# Patient Record
Sex: MALE | Race: WHITE | NOT HISPANIC OR LATINO | Employment: OTHER | URBAN - METROPOLITAN AREA
[De-identification: names, ages, dates, MRNs, and addresses within clinical notes are randomized per-mention and may not be internally consistent; named-entity substitution may affect disease eponyms.]

---

## 2017-02-23 ENCOUNTER — OFFICE VISIT (OUTPATIENT)
Dept: URGENT CARE | Facility: CLINIC | Age: 67
End: 2017-02-23
Payer: COMMERCIAL

## 2017-02-23 VITALS
DIASTOLIC BLOOD PRESSURE: 84 MMHG | SYSTOLIC BLOOD PRESSURE: 126 MMHG | TEMPERATURE: 97.5 F | OXYGEN SATURATION: 98 % | WEIGHT: 200 LBS | BODY MASS INDEX: 29.52 KG/M2 | HEART RATE: 74 BPM | RESPIRATION RATE: 16 BRPM

## 2017-02-23 DIAGNOSIS — R10.9 ABDOMINAL PAIN, OTHER SPECIFIED SITE: ICD-10-CM

## 2017-02-23 LAB
APPEARANCE UR: NORMAL
BILIRUB UR STRIP-MCNC: NORMAL MG/DL
COLOR UR AUTO: YELLOW
GLUCOSE UR STRIP.AUTO-MCNC: NORMAL MG/DL
KETONES UR STRIP.AUTO-MCNC: NORMAL MG/DL
LEUKOCYTE ESTERASE UR QL STRIP.AUTO: NORMAL
NITRITE UR QL STRIP.AUTO: NORMAL
PH UR STRIP.AUTO: 7.5 [PH] (ref 5–8)
PROT UR QL STRIP: NORMAL MG/DL
RBC UR QL AUTO: NORMAL
SP GR UR STRIP.AUTO: 1.01
UROBILINOGEN UR STRIP-MCNC: NORMAL MG/DL

## 2017-02-23 PROCEDURE — 99214 OFFICE O/P EST MOD 30 MIN: CPT | Performed by: FAMILY MEDICINE

## 2017-02-23 PROCEDURE — 81002 URINALYSIS NONAUTO W/O SCOPE: CPT | Performed by: FAMILY MEDICINE

## 2017-02-23 RX ORDER — CETIRIZINE HYDROCHLORIDE 10 MG/1
10 TABLET ORAL DAILY
COMMUNITY

## 2017-02-23 ASSESSMENT — ENCOUNTER SYMPTOMS
DIZZINESS: 0
DIARRHEA: 0
ANOREXIA: 0
CHILLS: 0
HEMOPTYSIS: 0
PALPITATIONS: 0
MYALGIAS: 0
HEADACHES: 0
NAUSEA: 0
FEVER: 0
ABDOMINAL PAIN: 1
VOMITING: 0
HEMATOCHEZIA: 1
SHORTNESS OF BREATH: 0
CONSTIPATION: 0
COUGH: 0

## 2017-02-23 NOTE — MR AVS SNAPSHOT
Uday Riversrobbi   2017 12:15 PM   Office Visit   MRN: [id number]    Department:  [facility]   Dept Phone:  [phone]    Description:  Male : 1950   Provider:  Sheldon Gutierrez M.D.           Reason for Visit     Abdominal Pain x 1 wk off / on, lower abdominal pain and lower back pain.        Allergies as of 2017     No Known Allergies      You were diagnosed with     Abdominal pain, other specified site   [789.09.ICD-9-CM]         Vital Signs     Blood Pressure Pulse Temperature Respirations Weight Oxygen Saturation    126/84 mmHg 74 36.4 °C (97.5 °F) 16 90.719 kg (200 lb) 98%    Smoking Status                   Former Smoker           Basic Information     Date Of Birth Sex Race Ethnicity Preferred Language    1950 Male White Non- English      Your appointments     2017  9:00 AM   US AORTA 30 with [facility] US 1   IMAGING [place] [facility] ([place])    [address] [place] [address]  [facility] [id number] [phone]           Adults: Fasting 8 hours. Water is okay. Encourage patient to take medications Children under 12 years: Fasting 4-6 hours Infants: Skip 1 feeding              Problem List              ICD-10-CM Priority Class Noted - Resolved    Ventral hernia K43.9   2015 - Present      Health Maintenance        Date Due Completion Dates    IMM DTaP/Tdap/Td Vaccine (1 - Tdap) 1970 ---    COLONOSCOPY 2001 ---    IMM ZOSTER VACCINE 2011 ---    IMM PNEUMOCOCCAL 65+ (ADULT) LOW/MEDIUM RISK SERIES (1 of 2 - PCV13) 2016 ---    IMM INFLUENZA (1) 2016 ---            Results     POCT Urinalysis      Component Value Standard Range & Units    POC Color yellow Negative    POC Appearance cloudy Negative    POC Leukocyte Esterase tr Negative    POC Nitrites neg Negative    POC Urobiligen neg Negative (0.2) mg/dL    POC Protein neg Negative mg/dL    POC Urine PH 7.5 5.0 - 8.0    POC Blood neg Negative    POC Specific Gravity 1.015 <1.005 - >1.030    POC  Ketones neg Negative mg/dL    POC Biliruben neg Negative mg/dL    POC Glucose neg Negative mg/dL                        Current Immunizations     No immunizations on file.      Below and/or attached are the medications your provider expects you to take. Review all of your home medications and newly ordered medications with your provider and/or pharmacist. Follow medication instructions as directed by your provider and/or pharmacist. Please keep your medication list with you and share with your provider. Update the information when medications are discontinued, doses are changed, or new medications (including over-the-counter products) are added; and carry medication information at all times in the event of emergency situations     Allergies:  No Known Allergies          Medications  Valid as of: February 23, 2017 -  2:13 PM    Generic Name Brand Name Tablet Size Instructions for use    Ascorbic Acid (Tab) ascorbic acid 500 MG Take 500 mg by mouth every day.        Aspirin (Tablet Delayed Response) ECOTRIN 81 MG Take 81 mg by mouth every day.        Azithromycin (Tab) ZITHROMAX 250 MG z-todd; U.D.        Cetirizine HCl (Tab) ZYRTEC 10 MG Take 10 mg by mouth every day.        Cholecalciferol (Cap) Vitamin D3 2000 UNIT Take  by mouth every day.        Fluticasone Propionate   Spray  in nose.        Glucosamine-Chondroit-Vit C-Mn   Take 2 Tabs by mouth every day.        Lovastatin (TABLET SR 24 HR) ALTOPREV 40 MG Take 40 mg by mouth every evening.        Multiple Vitamins-Minerals   Take 1 Tab by mouth.        OxyCODONE HCl (Tab) ROXICODONE 5 MG Take 1-2 Tabs by mouth every four hours as needed ((Pain Scale 4-6)).        .                 Medicines prescribed today were sent to:     [address] (), [place] [address]    [address] () [place] [address]    Phone: [phone] Fax: [phone]    Open 24 Hours?: No      Medication refill instructions:       If your prescription bottle  indicates you have medication refills left, it is not necessary to call your provider’s office. Please contact your pharmacy and they will refill your medication.    If your prescription bottle indicates you do not have any refills left, you may request refills at any time through one of the following ways: The online "[employer] system (except Urgent Care), by calling your provider’s office, or by asking your pharmacy to contact your provider’s office with a refill request. Medication refills are processed only during regular business hours and may not be available until the next business day. Your provider may request additional information or to have a follow-up visit with you prior to refilling your medication.   *Please Note: Medication refills are assigned a new Rx number when refilled electronically. Your pharmacy may indicate that no refills were authorized even though a new prescription for the same medication is available at the pharmacy. Please request the medicine by name with the pharmacy before contacting your provider for a refill.        Your To Do List     Future Labs/Procedures Complete By Expires    US-AORTA  As directed 2/23/2018    Comments:    Order in epic bl.         MyChart Access Code: [email or web address]  Expires: 3/6/2017  9:30 AM    "[employer]  A secure, online tool to manage your health information     [employer]’s "[employer]® is a secure, online tool that connects you to your personalized health information from the privacy of your home -- day or night - making it very easy for you to manage your healthcare. Once the activation process is completed, you can even access your medical information using the "[employer] leanne, which is available for free in the Apple Leanne store or Google Play store.     "[employer] provides the following levels of access (as shown below):   My Chart Features   [facility] Doctor [facility]  Specialists [facility]  Urgent  Care Non-[facility]  Primary Care  Doctor   Email your  healthcare team securely and privately 24/7 X X X    Manage appointments: schedule your next appointment; view details of past/upcoming appointments X      Request prescription refills. X      View recent personal medical records, including lab and immunizations X X X X   View health record, including health history, allergies, medications X X X X   Read reports about your outpatient visits, procedures, consult and ER notes X X X X   See your discharge summary, which is a recap of your hospital and/or ER visit that includes your diagnosis, lab results, and care plan. X X       How to register for [employer]:  1. Go to  [email or web address].  2. Click on the Sign Up Now box, which takes you to the New Member Sign Up page. You will need to provide the following information:  a. Enter your [employer] Access Code exactly as it appears at the top of this page. (You will not need to use this code after you’ve completed the sign-up process. If you do not sign up before the expiration date, you must request a new code.)   b. Enter your date of birth.   c. Enter your home email address.   d. Click Submit, and follow the next screen’s instructions.  3. Create a [employer] ID. This will be your [employer] login ID and cannot be changed, so think of one that is secure and easy to remember.  4. Create a [employer] password. You can change your password at any time.  5. Enter your Password Reset Question and Answer. This can be used at a later time if you forget your password.   6. Enter your e-mail address. This allows you to receive e-mail notifications when new information is available in [employer].  7. Click Sign Up. You can now view your health information.    For assistance activating your [employer] account, call [phone]

## 2017-02-23 NOTE — PROGRESS NOTES
Subjective:      Uday Osman is a 66 y.o. male who presents with Abdominal Pain            Abdominal Pain  This is a new problem. The current episode started 1 to 4 weeks ago. The onset quality is gradual. The problem occurs intermittently. Duration: lasting less than 1 minute. The problem has been gradually worsening. Pain location: LEFT and RIGHT marcell-umbilical and low back. The pain is at a severity of 3/10. The pain is mild. The quality of the pain is sharp and aching. The abdominal pain does not radiate. Associated symptoms include frequency and hematochezia. Pertinent negatives include no anorexia, constipation, diarrhea, dysuria, fever, headaches, hematuria, melena, myalgias, nausea or vomiting. Associated symptoms comments: Known hx of hemorrhoids. Nothing aggravates the pain. The pain is relieved by nothing. Treatments tried: laxitive. The treatment provided no relief. His past medical history is significant for abdominal surgery. There is no history of GERD, irritable bowel syndrome or ulcerative colitis.       Review of Systems   Constitutional: Negative for fever and chills.   Respiratory: Negative for cough, hemoptysis and shortness of breath.    Cardiovascular: Negative for chest pain and palpitations.   Gastrointestinal: Positive for abdominal pain and hematochezia. Negative for nausea, vomiting, diarrhea, constipation, melena and anorexia.   Genitourinary: Positive for frequency. Negative for dysuria and hematuria.   Musculoskeletal: Negative for myalgias.   Neurological: Negative for dizziness and headaches.     PMH:  has a past medical history of Snoring; Cold (3/15/15); and High cholesterol.  MEDS:   Current outpatient prescriptions:   •  cetirizine (ZYRTEC) 10 MG Tab, Take 10 mg by mouth every day., Disp: , Rfl:   •  aspirin EC (ECOTRIN) 81 MG TBEC, Take 81 mg by mouth every day., Disp: , Rfl:   •  Cholecalciferol (VITAMIN D3) 2000 UNIT CAPS, Take  by mouth every day., Disp: , Rfl:   •   ascorbic acid (ASCORBIC ACID) 500 MG TABS, Take 500 mg by mouth every day., Disp: , Rfl:   •  Multiple Vitamins-Minerals (MULTIVITAMIN PO), Take 1 Tab by mouth., Disp: , Rfl:   •  Glucosamine-Chondroit-Vit C-Mn (GLUCOSAMINE 1500 COMPLEX PO), Take 2 Tabs by mouth every day., Disp: , Rfl:   •  lovastatin (ALTOPREV) 40 MG ER tablet, Take 40 mg by mouth every evening., Disp: , Rfl:   •  Fluticasone Propionate (FLONASE NA), Spray  in nose., Disp: , Rfl:   •  oxycodone immediate-release (ROXICODONE) 5 MG TABS, Take 1-2 Tabs by mouth every four hours as needed ((Pain Scale 4-6))., Disp: 60 Tab, Rfl: 0  •  azithromycin (ZITHROMAX) 250 MG TABS, z-todd; U.D., Disp: 6 Tab, Rfl: 1  ALLERGIES: No Known Allergies  SURGHX:   Past Surgical History   Procedure Laterality Date   • Other orthopedic surgery       fx finger/ age 15   • Colonoscopy  2013     X 2   • Ventral hernia repair  4/22/2015     Performed by Sánchez Mayorga M.D. at SURGERY [facility]     SOCHX:  reports that he quit smoking about 27 years ago. His smoking use included Cigarettes. He has a 25 pack-year smoking history. He has never used smokeless tobacco. He reports that he does not drink alcohol or use illicit drugs.  FH: Family history was reviewed, no pertinent findings to report       Objective:     /84 mmHg  Pulse 74  Temp(Src) 36.4 °C (97.5 °F)  Resp 16  Wt 90.719 kg (200 lb)  SpO2 98%     Physical Exam   Constitutional: He appears well-developed.   HENT:   Head: Normocephalic.   Cardiovascular: Normal rate.    No murmur heard.  Pulmonary/Chest: Effort normal. No respiratory distress. He has no wheezes.   Abdominal: Soft. He exhibits no distension. There is no tenderness.   Aortic pulsations at LEFT mid to upper abdomen   Neurological: He is alert.   Skin: Skin is warm and dry.   Psychiatric: He has a normal mood and affect. His behavior is normal.               Assessment/Plan:     1. Abdominal pain, other specified site  POCT Urinalysis     US-AORTA     Intermittent abdominal pain  Pulsations on abd exam  Check aortic u/s  Follow-up if symptoms worsen or fail to improve, ER if after hrs    ADDENDUM:  February 24th 2017  Patient called  Requesting results to abdominal ultrasound  Relayed results of ultrasound findings being NORMAL  Mild atherosclerosis, otherwise normal study  Advised patient that this rules out a large risk factor for his presenting complaint of abdominal pain with radiation to the back  Location does not correlate with pancreatitis or other associated issues in the right upper quadrant  At this point, recommend following up with PCP for consideration of further blood work or possible referral to gastroenterology for consideration of lower GI endoscopy

## 2017-02-24 ENCOUNTER — HOSPITAL ENCOUNTER (OUTPATIENT)
Dept: RADIOLOGY | Facility: MEDICAL CENTER | Age: 67
End: 2017-02-24
Attending: FAMILY MEDICINE
Payer: COMMERCIAL

## 2017-02-24 DIAGNOSIS — R10.9 ABDOMINAL PAIN, OTHER SPECIFIED SITE: ICD-10-CM

## 2017-02-24 PROCEDURE — 76775 US EXAM ABDO BACK WALL LIM: CPT

## 2018-03-21 ENCOUNTER — OFFICE VISIT (OUTPATIENT)
Dept: MEDICAL GROUP | Facility: MEDICAL CENTER | Age: 68
End: 2018-03-21
Payer: COMMERCIAL

## 2018-03-21 VITALS
DIASTOLIC BLOOD PRESSURE: 82 MMHG | HEIGHT: 69 IN | WEIGHT: 200.62 LBS | SYSTOLIC BLOOD PRESSURE: 124 MMHG | RESPIRATION RATE: 16 BRPM | TEMPERATURE: 98.5 F | BODY MASS INDEX: 29.71 KG/M2 | HEART RATE: 64 BPM | OXYGEN SATURATION: 97 %

## 2018-03-21 DIAGNOSIS — K57.30 DIVERTICULOSIS OF LARGE INTESTINE WITHOUT HEMORRHAGE: ICD-10-CM

## 2018-03-21 DIAGNOSIS — Z12.5 SPECIAL SCREENING FOR MALIGNANT NEOPLASM OF PROSTATE: ICD-10-CM

## 2018-03-21 DIAGNOSIS — E78.1 PURE HYPERGLYCERIDEMIA: ICD-10-CM

## 2018-03-21 DIAGNOSIS — Z13.1 SCREENING FOR DIABETES MELLITUS (DM): ICD-10-CM

## 2018-03-21 DIAGNOSIS — E78.2 MIXED HYPERLIPIDEMIA: ICD-10-CM

## 2018-03-21 DIAGNOSIS — Z00.00 ANNUAL PHYSICAL EXAM: ICD-10-CM

## 2018-03-21 DIAGNOSIS — Z13.6 SCREENING FOR ISCHEMIC HEART DISEASE: ICD-10-CM

## 2018-03-21 PROCEDURE — 99387 INIT PM E/M NEW PAT 65+ YRS: CPT | Performed by: FAMILY MEDICINE

## 2018-03-21 ASSESSMENT — PATIENT HEALTH QUESTIONNAIRE - PHQ9: CLINICAL INTERPRETATION OF PHQ2 SCORE: 0

## 2018-03-21 NOTE — PROGRESS NOTES
This medical record contains text that has been entered with the assistance of computer voice recognition and dictation software.  Therefore, it may contain unintended errors in text, spelling, punctuation, or grammar        Chief Complaint   Patient presents with   • Establish Care       Uday Osman is a 67 y.o. 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 90 Tab 3   • cetirizine (ZYRTEC) 10 MG Tab Take 10 mg by mouth every day.     • aspirin EC (ECOTRIN) 81 MG TBEC Take 81 mg by mouth every day.     • Cholecalciferol (VITAMIN D3) 2000 UNIT CAPS Take  by mouth every day.     • ascorbic acid (ASCORBIC ACID) 500 MG TABS Take 500 mg by mouth every day.     • Multiple Vitamins-Minerals (MULTIVITAMIN PO) Take 1 Tab by mouth.     • Glucosamine-Chondroit-Vit C-Mn (GLUCOSAMINE 1500 COMPLEX PO) Take 2 Tabs by mouth every day.     • Fluticasone Propionate (FLONASE NA) Spray  in nose.       No current facility-administered medications for this visit.      Patient Active Problem List    Diagnosis Date Noted   • Mixed hyperlipidemia 03/21/2018   • Annual physical exam 03/21/2018   • Diverticulosis of large intestine without hemorrhage 03/21/2018   • Ventral hernia 04/22/2015     Past Surgical History:   Procedure Laterality Date   • VENTRAL HERNIA REPAIR  4/22/2015    Performed by Sánchez Mayorga M.D. at SURGERY [facility]   • COLONOSCOPY  2013    X 2   • OTHER ORTHOPEDIC SURGERY      fx finger/ age 15      Social History   Substance Use Topics   • Smoking status: Former Smoker     Packs/day: 1.00     Years: 25.00      "Types: Cigarettes     Quit date: 1/1/1990   • Smokeless tobacco: Never Used   • Alcohol use Yes      Comment: one drink/week     History reviewed. No pertinent family history.        ROS  No angina  all review of system completed and negative except for those listed above     Objective:     Blood pressure 124/82, pulse 64, temperature 36.9 °C (98.5 °F), resp. rate 16, height 1.753 m (5' 9\"), weight 91 kg (200 lb 9.9 oz), SpO2 97 %. Body mass index is 29.63 kg/m².  Physical Exam:    Constitutional: Alert, no distress.  Skin: Warm, dry, good turgor, no rashes in visible areas.  Eye: Equal, round and reactive, conjunctiva clear, lids normal.  ENMT: Lips without lesions, good dentition, oropharynx clear.  Neck: Trachea midline, no masses, no thyromegaly. No cervical or supraclavicular lymphadenopathy.  Respiratory: Unlabored respiratory effort, lungs clear to auscultation, no wheezes, no ronchi.  Cardiovascular: Normal S1, S2, no murmur, no edema.  Abdomen: Soft, non-tender, no masses, no hepatosplenomegaly.  Psych: Alert and oriented x3, normal affect and mood.              Assessment and Plan:   The following treatment plan was discussed        Problem List Items Addressed This Visit     Mixed hyperlipidemia     He usually does blood work including PSA at work 2x yearly, he will send those labs over   Tolerating his statin no problem , continue statin                     Relevant Medications    lovastatin (ALTOPREV) 40 MG ER tablet    Other Relevant Orders    PROSTATE SPECIFIC AG SCREENING    Annual physical exam     Up to date colonolscopy  Had 1 year ago due when he turns 71 for follow up         All other health maintenance up to date including todays orders               Diverticulosis of large intestine without hemorrhage      Other Visit Diagnoses     Special screening for malignant neoplasm of prostate        Relevant Orders    PROSTATE SPECIFIC AG SCREENING    Screening for diabetes mellitus (DM)        " Relevant Orders    PROSTATE SPECIFIC AG SCREENING    Screening for ischemic heart disease        Relevant Orders    PROSTATE SPECIFIC AG SCREENING                Instructed to follow up if symptoms worsen or fail to improve, ER/UC precautions discussed as well    Jesi Najera MD  [facility], Family [address]   [place] [address]  Phone: [phone]

## 2018-03-21 NOTE — ASSESSMENT & PLAN NOTE
He usually does blood work including PSA at work 2x yearly, he will send those labs over   Tolerating his statin no problem , continue statin

## 2018-03-21 NOTE — ASSESSMENT & PLAN NOTE
Up to date colonolscopy  Had 1 year ago due when he turns 71 for follow up         All other health maintenance up to date including todays orders

## 2018-06-21 RX ORDER — LOVASTATIN 40 MG/1
40 TABLET ORAL NIGHTLY
COMMUNITY
End: 2018-06-21 | Stop reason: SDUPTHER

## 2018-06-22 RX ORDER — LOVASTATIN 40 MG/1
40 TABLET ORAL EVERY EVENING
Qty: 90 TAB | Refills: 1 | Status: SHIPPED | OUTPATIENT
Start: 2018-06-22 | End: 2018-10-29 | Stop reason: SDUPTHER

## 2018-10-29 RX ORDER — LOVASTATIN 40 MG/1
TABLET ORAL
Qty: 90 TAB | Refills: 1 | Status: SHIPPED | OUTPATIENT
Start: 2018-10-29 | End: 2020-09-15

## 2019-03-13 ENCOUNTER — HOSPITAL ENCOUNTER (OUTPATIENT)
Dept: LAB | Facility: MEDICAL CENTER | Age: 69
End: 2019-03-13
Attending: SPECIALIST
Payer: COMMERCIAL

## 2019-03-13 LAB
ALBUMIN SERPL BCP-MCNC: 4.4 G/DL (ref 3.2–4.9)
ALBUMIN/GLOB SERPL: 1.5 G/DL
ALP SERPL-CCNC: 74 U/L (ref 30–99)
ALT SERPL-CCNC: 32 U/L (ref 2–50)
ANION GAP SERPL CALC-SCNC: 9 MMOL/L (ref 0–11.9)
AST SERPL-CCNC: 47 U/L (ref 12–45)
BASOPHILS # BLD AUTO: 0.8 % (ref 0–1.8)
BASOPHILS # BLD: 0.06 K/UL (ref 0–0.12)
BILIRUB SERPL-MCNC: 0.4 MG/DL (ref 0.1–1.5)
BUN SERPL-MCNC: 10 MG/DL (ref 8–22)
CALCIUM SERPL-MCNC: 9.5 MG/DL (ref 8.5–10.5)
CHLORIDE SERPL-SCNC: 107 MMOL/L (ref 96–112)
CO2 SERPL-SCNC: 29 MMOL/L (ref 20–33)
CREAT SERPL-MCNC: 0.96 MG/DL (ref 0.5–1.4)
CRP SERPL HS-MCNC: 0.05 MG/DL (ref 0–0.75)
EOSINOPHIL # BLD AUTO: 0.61 K/UL (ref 0–0.51)
EOSINOPHIL NFR BLD: 8.6 % (ref 0–6.9)
ERYTHROCYTE [DISTWIDTH] IN BLOOD BY AUTOMATED COUNT: 45.5 FL (ref 35.9–50)
GLOBULIN SER CALC-MCNC: 2.9 G/DL (ref 1.9–3.5)
GLUCOSE SERPL-MCNC: 93 MG/DL (ref 65–99)
HCT VFR BLD AUTO: 46.7 % (ref 42–52)
HGB BLD-MCNC: 15.2 G/DL (ref 14–18)
IMM GRANULOCYTES # BLD AUTO: 0.03 K/UL (ref 0–0.11)
IMM GRANULOCYTES NFR BLD AUTO: 0.4 % (ref 0–0.9)
LYMPHOCYTES # BLD AUTO: 1.69 K/UL (ref 1–4.8)
LYMPHOCYTES NFR BLD: 23.9 % (ref 22–41)
MCH RBC QN AUTO: 31.8 PG (ref 27–33)
MCHC RBC AUTO-ENTMCNC: 32.5 G/DL (ref 33.7–35.3)
MCV RBC AUTO: 97.7 FL (ref 81.4–97.8)
MONOCYTES # BLD AUTO: 0.52 K/UL (ref 0–0.85)
MONOCYTES NFR BLD AUTO: 7.4 % (ref 0–13.4)
NEUTROPHILS # BLD AUTO: 4.16 K/UL (ref 1.82–7.42)
NEUTROPHILS NFR BLD: 58.9 % (ref 44–72)
NRBC # BLD AUTO: 0 K/UL
NRBC BLD-RTO: 0 /100 WBC
PLATELET # BLD AUTO: 206 K/UL (ref 164–446)
PMV BLD AUTO: 11.2 FL (ref 9–12.9)
POTASSIUM SERPL-SCNC: 4.2 MMOL/L (ref 3.6–5.5)
PROT SERPL-MCNC: 7.3 G/DL (ref 6–8.2)
RBC # BLD AUTO: 4.78 M/UL (ref 4.7–6.1)
RHEUMATOID FACT SER IA-ACNC: <10 IU/ML (ref 0–14)
SODIUM SERPL-SCNC: 145 MMOL/L (ref 135–145)
WBC # BLD AUTO: 7.1 K/UL (ref 4.8–10.8)

## 2019-03-13 PROCEDURE — 85025 COMPLETE CBC W/AUTO DIFF WBC: CPT

## 2019-03-13 PROCEDURE — 86140 C-REACTIVE PROTEIN: CPT

## 2019-03-13 PROCEDURE — 86431 RHEUMATOID FACTOR QUANT: CPT

## 2019-03-13 PROCEDURE — 36415 COLL VENOUS BLD VENIPUNCTURE: CPT

## 2019-03-13 PROCEDURE — 86255 FLUORESCENT ANTIBODY SCREEN: CPT

## 2019-03-13 PROCEDURE — 86038 ANTINUCLEAR ANTIBODIES: CPT

## 2019-03-13 PROCEDURE — 80053 COMPREHEN METABOLIC PANEL: CPT

## 2019-03-15 LAB — NUCLEAR IGG SER QL IA: NORMAL

## 2019-03-16 LAB — ANCA IGG TITR SER IF: NORMAL {TITER}

## 2019-12-26 ENCOUNTER — OFFICE VISIT (OUTPATIENT)
Dept: URGENT CARE | Facility: CLINIC | Age: 69
End: 2019-12-26
Payer: COMMERCIAL

## 2019-12-26 VITALS
DIASTOLIC BLOOD PRESSURE: 80 MMHG | TEMPERATURE: 98.9 F | WEIGHT: 205 LBS | HEIGHT: 69 IN | BODY MASS INDEX: 30.36 KG/M2 | HEART RATE: 108 BPM | OXYGEN SATURATION: 96 % | SYSTOLIC BLOOD PRESSURE: 110 MMHG | RESPIRATION RATE: 16 BRPM

## 2019-12-26 DIAGNOSIS — R11.2 NAUSEA AND VOMITING, INTRACTABILITY OF VOMITING NOT SPECIFIED, UNSPECIFIED VOMITING TYPE: ICD-10-CM

## 2019-12-26 LAB
FLUAV+FLUBV AG SPEC QL IA: NEGATIVE
INT CON NEG: NEGATIVE
INT CON POS: POSITIVE

## 2019-12-26 PROCEDURE — 99214 OFFICE O/P EST MOD 30 MIN: CPT | Performed by: FAMILY MEDICINE

## 2019-12-26 PROCEDURE — 87804 INFLUENZA ASSAY W/OPTIC: CPT | Performed by: FAMILY MEDICINE

## 2019-12-26 RX ORDER — ONDANSETRON 4 MG/1
4 TABLET, ORALLY DISINTEGRATING ORAL EVERY 8 HOURS PRN
Qty: 6 TAB | Refills: 0 | Status: SHIPPED | OUTPATIENT
Start: 2019-12-26 | End: 2020-09-15

## 2019-12-26 RX ORDER — ONDANSETRON 4 MG/1
4 TABLET, ORALLY DISINTEGRATING ORAL ONCE
Status: COMPLETED | OUTPATIENT
Start: 2019-12-26 | End: 2019-12-26

## 2019-12-26 RX ORDER — ATORVASTATIN CALCIUM 20 MG/1
20 TABLET, FILM COATED ORAL NIGHTLY
COMMUNITY
End: 2021-06-17

## 2019-12-26 RX ADMIN — ONDANSETRON 4 MG: 4 TABLET, ORALLY DISINTEGRATING ORAL at 13:31

## 2019-12-26 ASSESSMENT — ENCOUNTER SYMPTOMS
WEIGHT LOSS: 0
EYE REDNESS: 0
MYALGIAS: 0
EYE DISCHARGE: 0
HEADACHES: 0

## 2019-12-26 NOTE — LETTER
December 26, 2019         Patient: Uday Osman   YOB: 1950   Date of Visit: 12/26/2019           To Whom it May Concern:    Uday Osman was seen in my clinic on 12/26/2019. Please excuse 12/26 through 12/28/2019. He may return on the 28th if no symptoms of contagious illness for 24 hours.     Sincerely,           Cristobal Dan M.D.  Electronically Signed

## 2019-12-26 NOTE — PROGRESS NOTES
"Subjective:      Uday Osman is a 69 y.o. male who presents with Flu Like Symptoms (vomiting and nausea, started today, still having nausea and diarrhea, chills)            Onset today N/V without blood in emesis. Intermittent mild chills. 2 episodes loose stool without blood. No abdominal pain. Possible turkey salad trigger. Normal urine output last night. Has urinated once here small volume.   No recent foreign travel, camping, abx. No cough/congestion. No other aggravating or alleviating factors.       Review of Systems   Constitutional: Negative for malaise/fatigue and weight loss.   Eyes: Negative for discharge and redness.   Musculoskeletal: Negative for joint pain and myalgias.   Skin: Negative for itching and rash.   Neurological: Negative for headaches.     .  Medications, Allergies, and current problem list reviewed today in Epic       Objective:     /80 (BP Location: Left arm, Patient Position: Sitting, BP Cuff Size: Adult)   Pulse (!) 108   Temp 37.2 °C (98.9 °F) (Temporal)   Resp 16   Ht 1.753 m (5' 9\")   Wt 93 kg (205 lb)   SpO2 96%   BMI 30.27 kg/m²      Physical Exam  Constitutional:       General: He is not in acute distress.     Appearance: He is well-developed.   HENT:      Head: Normocephalic and atraumatic.      Mouth/Throat:      Mouth: Mucous membranes are moist.   Eyes:      Conjunctiva/sclera: Conjunctivae normal.   Cardiovascular:      Rate and Rhythm: Normal rate and regular rhythm.      Heart sounds: Normal heart sounds. No murmur.      Comments: Rate now 90's  Pulmonary:      Effort: Pulmonary effort is normal.      Breath sounds: Normal breath sounds. No wheezing.   Abdominal:      Palpations: Abdomen is soft.      Tenderness: There is no tenderness.      Comments: Hyperactive bowel sounds.     Skin:     General: Skin is warm and dry.      Findings: No rash.   Neurological:      Mental Status: He is alert and oriented to person, place, and time.               "   Assessment/Plan:     Influenza negative    1. Nausea and vomiting, intractability of vomiting not specified, unspecified vomiting type  ondansetron (ZOFRAN ODT) dispertab 4 mg    POCT Influenza A/B    ondansetron (ZOFRAN ODT) 4 MG TABLET DISPERSIBLE     Differential diagnosis, natural history, supportive care, and indications for immediate follow-up discussed at length.

## 2020-01-15 ENCOUNTER — OFFICE VISIT (OUTPATIENT)
Dept: URGENT CARE | Facility: CLINIC | Age: 70
End: 2020-01-15
Payer: COMMERCIAL

## 2020-01-15 VITALS
SYSTOLIC BLOOD PRESSURE: 122 MMHG | HEIGHT: 69 IN | OXYGEN SATURATION: 95 % | RESPIRATION RATE: 16 BRPM | TEMPERATURE: 98 F | WEIGHT: 208 LBS | BODY MASS INDEX: 30.81 KG/M2 | DIASTOLIC BLOOD PRESSURE: 82 MMHG | HEART RATE: 84 BPM

## 2020-01-15 DIAGNOSIS — J06.9 VIRAL URI WITH COUGH: ICD-10-CM

## 2020-01-15 DIAGNOSIS — R09.81 NASAL CONGESTION: ICD-10-CM

## 2020-01-15 PROCEDURE — 99214 OFFICE O/P EST MOD 30 MIN: CPT | Performed by: PHYSICIAN ASSISTANT

## 2020-01-15 RX ORDER — CODEINE PHOSPHATE/GUAIFENESIN 10-100MG/5
5 LIQUID (ML) ORAL 3 TIMES DAILY PRN
Qty: 120 ML | Refills: 0 | Status: SHIPPED | OUTPATIENT
Start: 2020-01-15 | End: 2020-01-22

## 2020-01-15 RX ORDER — BENZONATATE 100 MG/1
100-200 CAPSULE ORAL 3 TIMES DAILY PRN
Qty: 60 CAP | Refills: 0 | Status: SHIPPED | OUTPATIENT
Start: 2020-01-15 | End: 2020-09-15

## 2020-01-15 ASSESSMENT — ENCOUNTER SYMPTOMS
DIZZINESS: 0
VOMITING: 0
SINUS PRESSURE: 1
SINUS PAIN: 1
NAUSEA: 0
WHEEZING: 0
FEVER: 0
SORE THROAT: 0
SPUTUM PRODUCTION: 0
HEADACHES: 1
HEMOPTYSIS: 0
SHORTNESS OF BREATH: 0
DIARRHEA: 0
MUSCULOSKELETAL NEGATIVE: 1
CHILLS: 0
COUGH: 1
ABDOMINAL PAIN: 0

## 2020-01-15 NOTE — PROGRESS NOTES
"Subjective:      Uday Osman is a 69 y.o. male who presents with Sinus Problem (x 4 days, nasal congestion, stuffy and runny nose, headaches) and Cough (x 2-3 days, nonproductive cough and chest congestion)            Sinus Problem   This is a new problem. The current episode started in the past 7 days (2-3 days). The problem is unchanged. There has been no fever. His pain is at a severity of 2/10. The pain is mild. Associated symptoms include congestion, coughing, headaches and sinus pressure. Pertinent negatives include no chills, ear pain, shortness of breath or sore throat. Past treatments include nothing.     Patient denies fevers, chills, body aches, chest pain, wheezing, productive cough, or SOB. No history of chronic lung disease or pneumonia. No history of smoking. He denies frequent/recurrent sinus infections. No known sick contacts or recent use of antibiotics.     Review of Systems   Constitutional: Negative for chills and fever.   HENT: Positive for congestion, sinus pressure and sinus pain. Negative for ear pain and sore throat.    Respiratory: Positive for cough. Negative for hemoptysis, sputum production, shortness of breath and wheezing.    Cardiovascular: Negative for chest pain.   Gastrointestinal: Negative for abdominal pain, diarrhea, nausea and vomiting.   Genitourinary: Negative.    Musculoskeletal: Negative.    Skin: Negative for rash.   Neurological: Positive for headaches. Negative for dizziness.        Objective:     /82 (BP Location: Left arm, Patient Position: Sitting, BP Cuff Size: Large adult)   Pulse 84   Temp 36.7 °C (98 °F) (Temporal)   Resp 16   Ht 1.753 m (5' 9\")   Wt 94.3 kg (208 lb)   SpO2 95%   BMI 30.72 kg/m²      Physical Exam  Vitals signs and nursing note reviewed.   Constitutional:       Appearance: Normal appearance. He is well-developed. He is not ill-appearing.   HENT:      Head: Normocephalic and atraumatic.      Right Ear: Hearing, tympanic " membrane, ear canal and external ear normal. There is no impacted cerumen.      Left Ear: Hearing, tympanic membrane, ear canal and external ear normal. There is no impacted cerumen.      Nose: Mucosal edema, congestion and rhinorrhea present.      Right Sinus: No maxillary sinus tenderness or frontal sinus tenderness.      Left Sinus: No maxillary sinus tenderness or frontal sinus tenderness.      Mouth/Throat:      Mouth: Mucous membranes are moist.      Pharynx: No oropharyngeal exudate or posterior oropharyngeal erythema.   Eyes:      Conjunctiva/sclera: Conjunctivae normal.      Pupils: Pupils are equal, round, and reactive to light.   Neck:      Musculoskeletal: Normal range of motion.   Cardiovascular:      Rate and Rhythm: Normal rate and regular rhythm.      Heart sounds: Normal heart sounds. No murmur.   Pulmonary:      Effort: Pulmonary effort is normal.      Breath sounds: Normal breath sounds. No wheezing or rales.   Musculoskeletal: Normal range of motion.   Skin:     General: Skin is warm and dry.   Neurological:      Mental Status: He is alert and oriented to person, place, and time.   Psychiatric:         Behavior: Behavior normal.            PMH:  has a past medical history of Cold (3/15/15), High cholesterol, and Snoring.  MEDS:   Current Outpatient Medications:   •  guaifenesin-codeine (TUSSI-ORGANIDIN NR) 100-10 MG/5ML syrup, Take 5 mL by mouth 3 times a day as needed for up to 7 days., Disp: 120 mL, Rfl: 0  •  benzonatate (TESSALON) 100 MG Cap, Take 1-2 Caps by mouth 3 times a day as needed., Disp: 60 Cap, Rfl: 0  •  atorvastatin (LIPITOR) 20 MG Tab, Take 20 mg by mouth every evening., Disp: , Rfl:   •  cetirizine (ZYRTEC) 10 MG Tab, Take 10 mg by mouth every day., Disp: , Rfl:   •  aspirin EC (ECOTRIN) 81 MG TBEC, Take 81 mg by mouth every day., Disp: , Rfl:   •  Cholecalciferol (VITAMIN D3) 2000 UNIT CAPS, Take  by mouth every day., Disp: , Rfl:   •  ascorbic acid (ASCORBIC ACID) 500 MG TABS,  Take 500 mg by mouth every day., Disp: , Rfl:   •  Multiple Vitamins-Minerals (MULTIVITAMIN PO), Take 1 Tab by mouth., Disp: , Rfl:   •  Glucosamine-Chondroit-Vit C-Mn (GLUCOSAMINE 1500 COMPLEX PO), Take 2 Tabs by mouth every day., Disp: , Rfl:   •  ondansetron (ZOFRAN ODT) 4 MG TABLET DISPERSIBLE, Take 1 Tab by mouth every 8 hours as needed. (Patient not taking: Reported on 1/15/2020), Disp: 6 Tab, Rfl: 0  •  lovastatin (MEVACOR) 40 MG tablet, TAKE 1 TABLET BY MOUTH  EVERY EVENING (Patient not taking: Reported on 1/15/2020), Disp: 90 Tab, Rfl: 1  •  lovastatin (ALTOPREV) 40 MG ER tablet, Take 1 Tab by mouth every day. (Patient not taking: Reported on 1/15/2020), Disp: 90 Tab, Rfl: 3  •  Fluticasone Propionate (FLONASE NA), Spray  in nose., Disp: , Rfl:   ALLERGIES: No Known Allergies  SURGHX:   Past Surgical History:   Procedure Laterality Date   • VENTRAL HERNIA REPAIR  4/22/2015    Performed by Sánchez Mayorga M.D. at SURGERY [facility] ORS   • COLONOSCOPY  2013    X 2   • OTHER ORTHOPEDIC SURGERY      fx finger/ age 15     SOCHX:  reports that he quit smoking about 30 years ago. His smoking use included cigarettes. He has a 25.00 pack-year smoking history. He has never used smokeless tobacco. He reports current alcohol use. He reports that he does not use drugs.  FH: family history is not on file.     Assessment/Plan:       1. Viral URI with cough  - guaifenesin-codeine (TUSSI-ORGANIDIN NR) 100-10 MG/5ML syrup; Take 5 mL by mouth 3 times a day as needed for up to 7 days.  Dispense: 120 mL; Refill: 0  - benzonatate (TESSALON) 100 MG Cap; Take 1-2 Caps by mouth 3 times a day as needed.  Dispense: 60 Cap; Refill: 0    2. Nasal congestion    Advised patient symptoms are most likely viral in etiology, recommend supportive care. Increased fluids and rest. Discussed use of nedi-pot, humidifier, and Flonase nasal spray for symptomatic relief. Tessalon perles and codeine cough syrup as needed for symptomatic relief. Call  or return to office if symptoms persist or worsen. The patient demonstrated a good understanding and agreed with the treatment plan.

## 2020-05-29 ENCOUNTER — HOSPITAL ENCOUNTER (OUTPATIENT)
Facility: MEDICAL CENTER | Age: 70
End: 2020-05-29
Payer: COMMERCIAL

## 2020-06-02 LAB
SARS-COV-2 RNA SPEC QL NAA+PROBE: NOT DETECTED
SPECIMEN SOURCE: NORMAL

## 2020-06-25 ENCOUNTER — TELEMEDICINE (OUTPATIENT)
Dept: MEDICAL GROUP | Facility: MEDICAL CENTER | Age: 70
End: 2020-06-25
Payer: COMMERCIAL

## 2020-06-25 ENCOUNTER — HOSPITAL ENCOUNTER (OUTPATIENT)
Dept: LAB | Facility: MEDICAL CENTER | Age: 70
End: 2020-06-25
Attending: FAMILY MEDICINE
Payer: COMMERCIAL

## 2020-06-25 VITALS — WEIGHT: 198 LBS | BODY MASS INDEX: 29.33 KG/M2 | HEART RATE: 64 BPM | HEIGHT: 69 IN

## 2020-06-25 DIAGNOSIS — Z12.11 SCREENING FOR COLON CANCER: ICD-10-CM

## 2020-06-25 DIAGNOSIS — Z87.898 HISTORY OF FEVER: ICD-10-CM

## 2020-06-25 DIAGNOSIS — Z11.59 NEED FOR HEPATITIS C SCREENING TEST: ICD-10-CM

## 2020-06-25 DIAGNOSIS — Z00.00 ANNUAL PHYSICAL EXAM: ICD-10-CM

## 2020-06-25 PROCEDURE — 86769 SARS-COV-2 COVID-19 ANTIBODY: CPT

## 2020-06-25 PROCEDURE — 86803 HEPATITIS C AB TEST: CPT

## 2020-06-25 PROCEDURE — 36415 COLL VENOUS BLD VENIPUNCTURE: CPT

## 2020-06-25 PROCEDURE — 99212 OFFICE O/P EST SF 10 MIN: CPT | Mod: 95,CR | Performed by: FAMILY MEDICINE

## 2020-06-25 RX ORDER — ATORVASTATIN CALCIUM 40 MG/1
40 TABLET, FILM COATED ORAL NIGHTLY
COMMUNITY
End: 2021-06-17 | Stop reason: SDUPTHER

## 2020-06-25 ASSESSMENT — FIBROSIS 4 INDEX: FIB4 SCORE: 2.74

## 2020-06-25 NOTE — ASSESSMENT & PLAN NOTE
Patient is interested in covid AB testing   Works for [employer] so does PCR testing regularly for work   He actually does most of his primary care and urgent care through [facility] as they have on sight medical that is free and convenient for him       15+ min face to face >50% spent discussing plan /coordinating care

## 2020-06-25 NOTE — PROGRESS NOTES
Telemedicine Visit: Established Patient     This encounter was conducted via [employer].   Verbal consent was obtained. Patient's identity was verified.    Subjective:   CC: request antibody test   Uday Osman is a 69 y.o. male presenting for evaluation and management of:    Feels well in his usual state of health   We have not seen him in 2 years approx     ROS   Denies any recent fevers or chills. No nausea or vomiting. No chest pains or shortness of breath.     No Known Allergies    Current medicines (including changes today)  Current Outpatient Medications   Medication Sig Dispense Refill   • atorvastatin (LIPITOR) 40 MG Tab Take 40 mg by mouth every evening.     • cetirizine (ZYRTEC) 10 MG Tab Take 10 mg by mouth every day.     • aspirin EC (ECOTRIN) 81 MG TBEC Take 81 mg by mouth every day.     • Cholecalciferol (VITAMIN D3) 2000 UNIT CAPS Take  by mouth every day.     • ascorbic acid (ASCORBIC ACID) 500 MG TABS Take 500 mg by mouth every day.     • Multiple Vitamins-Minerals (MULTIVITAMIN PO) Take 1 Tab by mouth.     • Glucosamine-Chondroit-Vit C-Mn (GLUCOSAMINE 1500 COMPLEX PO) Take 2 Tabs by mouth every day.     • benzonatate (TESSALON) 100 MG Cap Take 1-2 Caps by mouth 3 times a day as needed. (Patient not taking: Reported on 6/25/2020) 60 Cap 0   • atorvastatin (LIPITOR) 20 MG Tab Take 20 mg by mouth every evening.     • ondansetron (ZOFRAN ODT) 4 MG TABLET DISPERSIBLE Take 1 Tab by mouth every 8 hours as needed. (Patient not taking: Reported on 1/15/2020) 6 Tab 0   • lovastatin (MEVACOR) 40 MG tablet TAKE 1 TABLET BY MOUTH  EVERY EVENING (Patient not taking: Reported on 1/15/2020) 90 Tab 1   • lovastatin (ALTOPREV) 40 MG ER tablet Take 1 Tab by mouth every day. (Patient not taking: Reported on 1/15/2020) 90 Tab 3   • Fluticasone Propionate (FLONASE NA) Spray  in nose.       No current facility-administered medications for this visit.        Patient Active Problem List    Diagnosis Date Noted   •  "History of fever 06/25/2020   • Mixed hyperlipidemia 03/21/2018   • Annual physical exam 03/21/2018   • Diverticulosis of large intestine without hemorrhage 03/21/2018   • Ventral hernia 04/22/2015       No family history on file.    He  has a past medical history of Cold (3/15/15), High cholesterol, and Snoring.  He  has a past surgical history that includes other orthopedic surgery; colonoscopy (2013); and ventral hernia repair (4/22/2015).       Objective:   Pulse 64 Comment: pt stated  Ht 1.753 m (5' 9\") Comment: pt stated  Wt 89.8 kg (198 lb) Comment: pt stated  BMI 29.24 kg/m²     Physical Exam:  Constitutional: Alert, no distress, well-groomed.  Skin: No rashes in visible areas.  Eye: Round. Conjunctiva clear, lids normal. No icterus.   ENMT: Lips pink without lesions, good dentition, moist mucous membranes. Phonation normal.  Neck: No masses, no thyromegaly. Moves freely without pain.  CV: Pulse as reported by patient  Respiratory: Unlabored respiratory effort, no cough or audible wheeze  Psych: Alert and oriented x3, normal affect and mood.       Assessment and Plan:   The following treatment plan was discussed:     1. Screening for colon cancer  - OCCULT BLOOD FECES IMMUNOASSAY (FIT); Future    2. History of fever  - MISCELLANEOUS LAB TEST ([facility]/Other); Future    3. Need for hepatitis C screening test  - HEP C VIRUS ANTIBODY; Future    4. Annual physical exam    Other orders  - atorvastatin (LIPITOR) 40 MG Tab; Take 40 mg by mouth every evening.      Problem List Items Addressed This Visit     Annual physical exam     Patient is interested in covid AB testing   Works for [employer] so does PCR testing regularly for work   He actually does most of his primary care and urgent care through [employer] as they have on sight medical that is free and convenient for him       15+ min face to face >50% spent discussing plan /coordinating care            History of fever    Relevant Orders    MISCELLANEOUS LAB " TEST ([facility]/Other)      Other Visit Diagnoses     Screening for colon cancer        Relevant Orders    OCCULT BLOOD FECES IMMUNOASSAY (FIT)    Need for hepatitis C screening test        Relevant Orders    HEP C VIRUS ANTIBODY        Jesi Najera M.D.    Follow-up: No follow-ups on file.

## 2020-06-26 LAB
HCV AB SER QL: NORMAL
SARS-COV-2 AB SERPL QL IA: NORMAL

## 2020-09-15 ENCOUNTER — OFFICE VISIT (OUTPATIENT)
Dept: URGENT CARE | Facility: CLINIC | Age: 70
End: 2020-09-15
Payer: COMMERCIAL

## 2020-09-15 VITALS
DIASTOLIC BLOOD PRESSURE: 80 MMHG | SYSTOLIC BLOOD PRESSURE: 136 MMHG | HEART RATE: 74 BPM | RESPIRATION RATE: 14 BRPM | HEIGHT: 69 IN | WEIGHT: 193 LBS | BODY MASS INDEX: 28.58 KG/M2 | OXYGEN SATURATION: 98 % | TEMPERATURE: 97.6 F

## 2020-09-15 DIAGNOSIS — S80.811A ABRASION OF RIGHT LOWER LEG WITH INFECTION, INITIAL ENCOUNTER: ICD-10-CM

## 2020-09-15 DIAGNOSIS — L08.9 ABRASION OF RIGHT LOWER LEG WITH INFECTION, INITIAL ENCOUNTER: ICD-10-CM

## 2020-09-15 PROCEDURE — 90715 TDAP VACCINE 7 YRS/> IM: CPT | Performed by: PHYSICIAN ASSISTANT

## 2020-09-15 PROCEDURE — 90471 IMMUNIZATION ADMIN: CPT | Performed by: PHYSICIAN ASSISTANT

## 2020-09-15 PROCEDURE — 99214 OFFICE O/P EST MOD 30 MIN: CPT | Mod: 25 | Performed by: PHYSICIAN ASSISTANT

## 2020-09-15 RX ORDER — CEPHALEXIN 500 MG/1
500 CAPSULE ORAL 3 TIMES DAILY
Qty: 15 CAP | Refills: 0 | Status: SHIPPED | OUTPATIENT
Start: 2020-09-15 | End: 2020-09-20

## 2020-09-15 ASSESSMENT — FIBROSIS 4 INDEX: FIB4 SCORE: 2.78

## 2020-09-15 ASSESSMENT — ENCOUNTER SYMPTOMS: FEVER: 0

## 2020-09-15 NOTE — PROGRESS NOTES
Subjective:   Uday Osman is a 70 y.o. male who presents today with   Chief Complaint   Patient presents with   • Wound Infection     poss infected abrasion on (R) knee       Wound Infection  This is a new problem. The current episode started in the past 7 days. The problem occurs constantly. The problem has been unchanged. Pertinent negatives include no fever or rash. Nothing aggravates the symptoms. Treatments tried: Betadine and iodine, Neosporin. The treatment provided no relief.     Patient was hiking last week and scrapped his shin on a wooden pallet.  PMH:  has a past medical history of Cold (3/15/15), High cholesterol, and Snoring.  MEDS:   Current Outpatient Medications:   •  cephALEXin (KEFLEX) 500 MG Cap, Take 1 Cap by mouth 3 times a day for 5 days., Disp: 15 Cap, Rfl: 0  •  atorvastatin (LIPITOR) 40 MG Tab, Take 40 mg by mouth every evening., Disp: , Rfl:   •  atorvastatin (LIPITOR) 20 MG Tab, Take 20 mg by mouth every evening., Disp: , Rfl:   •  cetirizine (ZYRTEC) 10 MG Tab, Take 10 mg by mouth every day., Disp: , Rfl:   •  aspirin EC (ECOTRIN) 81 MG TBEC, Take 81 mg by mouth every day., Disp: , Rfl:   •  Cholecalciferol (VITAMIN D3) 2000 UNIT CAPS, Take  by mouth every day., Disp: , Rfl:   •  ascorbic acid (ASCORBIC ACID) 500 MG TABS, Take 500 mg by mouth every day., Disp: , Rfl:   •  Multiple Vitamins-Minerals (MULTIVITAMIN PO), Take 1 Tab by mouth., Disp: , Rfl:   •  Glucosamine-Chondroit-Vit C-Mn (GLUCOSAMINE 1500 COMPLEX PO), Take 2 Tabs by mouth every day., Disp: , Rfl:   ALLERGIES: No Known Allergies  SURGHX:   Past Surgical History:   Procedure Laterality Date   • VENTRAL HERNIA REPAIR  4/22/2015    Performed by Sánchez Mayorga M.D. at SURGERY [facility] ORS   • COLONOSCOPY  2013    X 2   • OTHER ORTHOPEDIC SURGERY      fx finger/ age 15     SOCHX:  reports that he quit smoking about 30 years ago. His smoking use included cigarettes. He has a 25.00 pack-year smoking history. He has  "never used smokeless tobacco. He reports current alcohol use. He reports that he does not use drugs.  FH: Reviewed with patient, not pertinent to this visit.       Review of Systems   Constitutional: Negative for fever.   Skin: Negative for rash.        Right shin abrasion   All other systems reviewed and are negative.       Objective:   /80 (BP Location: Left arm, Patient Position: Sitting, BP Cuff Size: Adult)   Pulse 74   Temp 36.4 °C (97.6 °F) (Temporal)   Resp 14   Ht 1.753 m (5' 9\")   Wt 87.5 kg (193 lb)   SpO2 98%   BMI 28.50 kg/m²   Physical Exam  Vitals signs and nursing note reviewed.   Constitutional:       General: He is not in acute distress.     Appearance: Normal appearance. He is well-developed. He is not ill-appearing or toxic-appearing.   HENT:      Head: Normocephalic and atraumatic.      Right Ear: Hearing normal.      Left Ear: Hearing normal.   Eyes:      Pupils: Pupils are equal, round, and reactive to light.   Cardiovascular:      Rate and Rhythm: Normal rate and regular rhythm.      Heart sounds: Normal heart sounds.   Pulmonary:      Effort: Pulmonary effort is normal.   Musculoskeletal:        Legs:       Comments: Mild tenderness to palpation around the wound site to the anterior shin.  Normal range of motion and strength in bilateral lower extremities. No bony point tenderness   Skin:     General: Skin is warm and dry.      Comments: Approximately 1 cm x 2 cm superficial abrasion to the right shin with mild surrounding erythema localized to the wound site.  Small amount of yellow scabbing and discharge.   Neurological:      Mental Status: He is alert.      Coordination: Coordination normal.   Psychiatric:         Mood and Affect: Mood normal.         Assessment/Plan:   Assessment    1. Abrasion of right lower leg with infection, initial encounter  - cephALEXin (KEFLEX) 500 MG Cap; Take 1 Cap by mouth 3 times a day for 5 days.  Dispense: 15 Cap; Refill: 0  - Tdap =>6yo " IM  Will cover for infection today given erythema and drainage on exam today. Encourage patient to continue using topical antibiotic ointment.  Encouraged him to start keeping the area covered until it heals with regular dressing changes.  Antibiotic ointment, dressing placed today.  Wound care instructions given to patient.  Instructions to follow-up with any new concerns discussed.  Tetanus updated today.  Differential diagnosis, natural history, supportive care, and indications for immediate follow-up discussed.   Patient given instructions and understanding of medications and treatment.    If not improving in 3-5 days, F/U with PCP or return to UC if symptoms worsen.    Patient agreeable to plan.      Please note that this dictation was created using voice recognition software. I have made every reasonable attempt to correct obvious errors, but I expect that there are errors of grammar and possibly content that I did not discover before finalizing the note.    Rafa Larsen PA-C

## 2021-03-03 DIAGNOSIS — Z23 NEED FOR VACCINATION: ICD-10-CM

## 2021-03-11 ENCOUNTER — HOSPITAL ENCOUNTER (OUTPATIENT)
Dept: RADIOLOGY | Facility: MEDICAL CENTER | Age: 71
End: 2021-03-11
Attending: FAMILY MEDICINE
Payer: COMMERCIAL

## 2021-03-11 DIAGNOSIS — K57.90 DIVERTICULOSIS: ICD-10-CM

## 2021-03-11 PROCEDURE — 76700 US EXAM ABDOM COMPLETE: CPT

## 2021-06-17 ENCOUNTER — OFFICE VISIT (OUTPATIENT)
Dept: MEDICAL GROUP | Facility: MEDICAL CENTER | Age: 71
End: 2021-06-17
Payer: MEDICARE

## 2021-06-17 VITALS
BODY MASS INDEX: 27.76 KG/M2 | SYSTOLIC BLOOD PRESSURE: 130 MMHG | WEIGHT: 187.39 LBS | DIASTOLIC BLOOD PRESSURE: 70 MMHG | OXYGEN SATURATION: 97 % | TEMPERATURE: 98.7 F | HEART RATE: 67 BPM | RESPIRATION RATE: 16 BRPM | HEIGHT: 69 IN

## 2021-06-17 DIAGNOSIS — Z12.11 SCREENING FOR COLON CANCER: ICD-10-CM

## 2021-06-17 DIAGNOSIS — T70.20XS EFFECTS OF HIGH ALTITUDE, SEQUELA: ICD-10-CM

## 2021-06-17 DIAGNOSIS — R03.0 ELEVATED BLOOD PRESSURE READING: ICD-10-CM

## 2021-06-17 DIAGNOSIS — Z00.00 MEDICARE ANNUAL WELLNESS VISIT, SUBSEQUENT: ICD-10-CM

## 2021-06-17 DIAGNOSIS — Z13.1 SCREENING FOR DIABETES MELLITUS (DM): ICD-10-CM

## 2021-06-17 DIAGNOSIS — Z12.5 SPECIAL SCREENING FOR MALIGNANT NEOPLASM OF PROSTATE: ICD-10-CM

## 2021-06-17 DIAGNOSIS — Z23 NEED FOR VACCINATION: ICD-10-CM

## 2021-06-17 DIAGNOSIS — E78.2 MIXED HYPERLIPIDEMIA: ICD-10-CM

## 2021-06-17 DIAGNOSIS — Z13.6 SCREENING FOR ISCHEMIC HEART DISEASE: ICD-10-CM

## 2021-06-17 PROBLEM — T70.20XA EFFECTS OF HIGH ALTITUDE: Status: ACTIVE | Noted: 2021-06-17

## 2021-06-17 PROCEDURE — 90750 HZV VACC RECOMBINANT IM: CPT | Performed by: FAMILY MEDICINE

## 2021-06-17 PROCEDURE — G0438 PPPS, INITIAL VISIT: HCPCS | Mod: 25 | Performed by: FAMILY MEDICINE

## 2021-06-17 PROCEDURE — 99999 PR NO CHARGE: CPT | Performed by: FAMILY MEDICINE

## 2021-06-17 PROCEDURE — 90471 IMMUNIZATION ADMIN: CPT | Performed by: FAMILY MEDICINE

## 2021-06-17 RX ORDER — ATORVASTATIN CALCIUM 40 MG/1
40 TABLET, FILM COATED ORAL DAILY
Qty: 90 TABLET | Refills: 3 | Status: SHIPPED | OUTPATIENT
Start: 2021-06-17 | End: 2022-07-28

## 2021-06-17 RX ORDER — ACETAZOLAMIDE 250 MG/1
250 TABLET ORAL 2 TIMES DAILY PRN
Qty: 60 TABLET | Refills: 3 | Status: SHIPPED | OUTPATIENT
Start: 2021-06-17 | End: 2022-02-24

## 2021-06-17 ASSESSMENT — ENCOUNTER SYMPTOMS: GENERAL WELL-BEING: GOOD

## 2021-06-17 ASSESSMENT — ACTIVITIES OF DAILY LIVING (ADL): BATHING_REQUIRES_ASSISTANCE: 0

## 2021-06-17 ASSESSMENT — PATIENT HEALTH QUESTIONNAIRE - PHQ9: CLINICAL INTERPRETATION OF PHQ2 SCORE: 0

## 2021-06-17 NOTE — PROGRESS NOTES
Chief Complaint   Patient presents with   • Annual Wellness Visit         HPI:  Uday is a 70 y.o. here for Medicare Annual Wellness Visit  Retired this year from [employer], he used to get regular labs there but will be following up here more regularly     Plans to hike [place]   Feels well in his usual state of health     Patient Active Problem List    Diagnosis Date Noted   • Effects of high altitude 06/17/2021   • Elevated blood pressure reading 06/17/2021   • Medicare annual wellness visit, subsequent 06/17/2021   • History of fever 06/25/2020   • Mixed hyperlipidemia 03/21/2018   • Annual physical exam 03/21/2018   • Diverticulosis of large intestine without hemorrhage 03/21/2018   • Ventral hernia 04/22/2015       Current Outpatient Medications   Medication Sig Dispense Refill   • coenzyme Q-10 30 MG capsule Take 60 mg by mouth every day.     • psyllium (METAMUCIL) 58.12 % Pack Take 1 Packet by mouth 2 times a day.     • acetaZOLAMIDE (DIAMOX) 250 MG Tab Take 1 tablet by mouth 2 times a day as needed. 60 tablet 3   • atorvastatin (LIPITOR) 40 MG Tab Take 40 mg by mouth every evening.     • cetirizine (ZYRTEC) 10 MG Tab Take 10 mg by mouth every day.     • aspirin EC (ECOTRIN) 81 MG TBEC Take 81 mg by mouth every day.     • Cholecalciferol (VITAMIN D3) 2000 UNIT CAPS Take  by mouth every day.     • ascorbic acid (ASCORBIC ACID) 500 MG TABS Take 500 mg by mouth every day.     • Multiple Vitamins-Minerals (MULTIVITAMIN PO) Take 1 Tab by mouth.     • Glucosamine-Chondroit-Vit C-Mn (GLUCOSAMINE 1500 COMPLEX PO) Take 2 Tabs by mouth every day.     • atorvastatin (LIPITOR) 20 MG Tab Take 20 mg by mouth every evening.       No current facility-administered medications for this visit.        Patient is taking medications as noted in medication list.  Current supplements as per medication list.     Allergies: Patient has no known allergies.    Current social contact/activities: yes, when he exercises     Is patient  current with immunizations? No, due for SHINGRIX (Shingles). Patient is interested in receiving SHINGRIX (Shingles) today.    He  reports that he quit smoking about 31 years ago. His smoking use included cigarettes. He has a 25.00 pack-year smoking history. He has never used smokeless tobacco. He reports current alcohol use. He reports current drug use. Drugs: Marijuana and Oral.  Counseling given: Not Answered        DPA/Advanced directive: Patient does not have an Advanced Directive.  A packet and workshop information was given on Advanced Directives.    ROS:    Gait: Uses no assistive device   Ostomy: No   Other tubes: No   Amputations: No   Chronic oxygen use No   Last eye exam 6/2021  Wears hearing aids: No   : Denies any urinary leakage during the last 6 months      Screening:        Depression Screening    Little interest or pleasure in doing things?  0 - not at all  Feeling down, depressed, or hopeless? 0 - not at all  Patient Health Questionnaire Score: 0    If depressive symptoms identified deferred to follow up visit unless specifically addressed in assessment and plan.    Interpretation of PHQ-9 Total Score   Score Severity   1-4 No Depression   5-9 Mild Depression   10-14 Moderate Depression   15-19 Moderately Severe Depression   20-27 Severe Depression    Screening for Cognitive Impairment    Three Minute Recall (captain, garden, picture)  3/3    Juan Manuel clock face with all 12 numbers and set the hands to show 5 past 8.  Yes    If cognitive concerns identified, deferred for follow up unless specifically addressed in assessment and plan.    Fall Risk Assessment    Has the patient had two or more falls in the last year or any fall with injury in the last year?  No  If fall risk identified, deferred for follow up unless specifically addressed in assessment and plan.    Safety Assessment    Throw rugs on floor.  Yes  Handrails on all stairs.  No  Good lighting in all hallways.  Yes  Difficulty hearing.   Yes  Patient counseled about all safety risks that were identified.    Functional Assessment ADLs    Are there any barriers preventing you from cooking for yourself or meeting nutritional needs?  No.    Are there any barriers preventing you from driving safely or obtaining transportation?  No.    Are there any barriers preventing you from using a telephone or calling for help?  No.    Are there any barriers preventing you from shopping?  No.    Are there any barriers preventing you from taking care of your own finances?  No.    Are there any barriers preventing you from managing your medications?  No.    Are there any barriers preventing you from showering, bathing or dressing yourself?  No.    Are you currently engaging in any exercise or physical activity?  Yes.     What is your perception of your health?  Good.    Health Maintenance Summary                COLONOSCOPY Overdue 2001     IMM ZOSTER VACCINES Next Due 2021      Done 2021 Imm Admin: Zoster Vaccine Recombinant (RZV) (SHINGRIX)     Patient has more history with this topic...    IMM DTaP/Tdap/Td Vaccine Next Due 9/15/2030      Done 9/15/2020 Imm Admin: Tdap Vaccine     Patient has more history with this topic...          Patient Care Team:  Jesi Najera M.D. as PCP - General (Family Medicine)    Social History     Tobacco Use   • Smoking status: Former Smoker     Packs/day: 1.00     Years: 25.00     Pack years: 25.00     Types: Cigarettes     Quit date: 1990     Years since quittin.4   • Smokeless tobacco: Never Used   Vaping Use   • Vaping Use: Never used   Substance Use Topics   • Alcohol use: Yes     Comment: rarely   • Drug use: Yes     Types: Marijuana, Oral     Comment: rare     No family history on file.  He  has a past medical history of Cold (3/15/15), High cholesterol, and Snoring.   Past Surgical History:   Procedure Laterality Date   • VENTRAL HERNIA REPAIR  2015    Performed by Sánchez Mayorga M.D. at SURGERY  [facility]   • COLONOSCOPY  2013    X 2   • OTHER ORTHOPEDIC SURGERY      fx finger/ age 15           Exam:     /70 (BP Location: Left arm, Patient Position: Sitting, BP Cuff Size: Adult)   Pulse 67   Temp 37.1 °C (98.7 °F) (Temporal)   Resp 16   Ht 1.753 m (5' 9\")   Wt 85 kg (187 lb 6.3 oz)   SpO2 97%  Body mass index is 27.67 kg/m².    Hearing excellent.    Dentition good  Alert, oriented in no acute distress.  Eye contact is good, speech goal directed, affect calm      Assessment and Plan. The following treatment and monitoring plan is recommended:    1. Need for vaccination  Shingrix Vaccine   2. Special screening for malignant neoplasm of prostate  PROSTATE SPECIFIC AG SCREENING   3. Screening for diabetes mellitus (DM)  Basic Metabolic Panel    Lipid Profile    MICROALBUMIN CREAT RATIO URINE   4. Screening for ischemic heart disease  Basic Metabolic Panel    Lipid Profile    MICROALBUMIN CREAT RATIO URINE   5. Screening for colon cancer  OCCULT BLOOD FECES IMMUNOASSAY    REFERRAL TO GI FOR COLONOSCOPY   6. Elevated blood pressure reading  Basic Metabolic Panel    Lipid Profile    MICROALBUMIN CREAT RATIO URINE   7. Effects of high altitude, sequela  acetaZOLAMIDE (DIAMOX) 250 MG Tab   8. Mixed hyperlipidemia     9. Medicare annual wellness visit, subsequent           Services suggested: No services needed at this time  Health Care Screening recommendations as per orders if indicated.  Referrals offered: PT/OT/Nutrition counseling/Behavioral Health/Smoking cessation as per orders if indicated.    Discussion today about general wellness and lifestyle habits:    · Prevent falls and reduce trip hazards; Cautioned about securing or removing rugs.  · Have a working fire alarm and carbon monoxide detector;   · Engage in regular physical activity and social activities       Follow-up: No follow-ups on file.     Problem List Items Addressed This Visit     Mixed hyperlipidemia     Labs ordered "   Tolerating statin              Relevant Medications    acetaZOLAMIDE (DIAMOX) 250 MG Tab    Effects of high altitude     Hoping to hike [place] again this summer   Request diamox              Relevant Medications    acetaZOLAMIDE (DIAMOX) 250 MG Tab    Elevated blood pressure reading     Not candidate for meds   However I recommend q6 mo clinic visits and labs              Relevant Orders    Basic Metabolic Panel    Lipid Profile    MICROALBUMIN CREAT RATIO URINE    Medicare annual wellness visit, subsequent      Other Visit Diagnoses     Need for vaccination        Relevant Orders    Shingrix Vaccine (Completed)    Special screening for malignant neoplasm of prostate        Relevant Orders    PROSTATE SPECIFIC AG SCREENING    Screening for diabetes mellitus (DM)        Relevant Orders    Basic Metabolic Panel    Lipid Profile    MICROALBUMIN CREAT RATIO URINE    Screening for ischemic heart disease        Relevant Orders    Basic Metabolic Panel    Lipid Profile    MICROALBUMIN CREAT RATIO URINE    Screening for colon cancer        Relevant Orders    OCCULT BLOOD FECES IMMUNOASSAY    REFERRAL TO GI FOR COLONOSCOPY

## 2021-06-18 ENCOUNTER — HOSPITAL ENCOUNTER (OUTPATIENT)
Dept: LAB | Facility: MEDICAL CENTER | Age: 71
End: 2021-06-18
Attending: FAMILY MEDICINE
Payer: MEDICARE

## 2021-06-18 DIAGNOSIS — Z12.5 SPECIAL SCREENING FOR MALIGNANT NEOPLASM OF PROSTATE: ICD-10-CM

## 2021-06-18 DIAGNOSIS — R03.0 ELEVATED BLOOD PRESSURE READING: ICD-10-CM

## 2021-06-18 LAB
ANION GAP SERPL CALC-SCNC: 7 MMOL/L (ref 7–16)
BUN SERPL-MCNC: 17 MG/DL (ref 8–22)
CALCIUM SERPL-MCNC: 8.9 MG/DL (ref 8.5–10.5)
CHLORIDE SERPL-SCNC: 108 MMOL/L (ref 96–112)
CHOLEST SERPL-MCNC: 120 MG/DL (ref 100–199)
CO2 SERPL-SCNC: 22 MMOL/L (ref 20–33)
CREAT SERPL-MCNC: 0.81 MG/DL (ref 0.5–1.4)
CREAT UR-MCNC: 75.98 MG/DL
FASTING STATUS PATIENT QL REPORTED: NORMAL
GLUCOSE SERPL-MCNC: 98 MG/DL (ref 65–99)
HDLC SERPL-MCNC: 45 MG/DL
LDLC SERPL CALC-MCNC: 56 MG/DL
MICROALBUMIN UR-MCNC: <1.2 MG/DL
MICROALBUMIN/CREAT UR: NORMAL MG/G (ref 0–30)
POTASSIUM SERPL-SCNC: 4 MMOL/L (ref 3.6–5.5)
PSA SERPL-MCNC: 2.85 NG/ML (ref 0–4)
SODIUM SERPL-SCNC: 137 MMOL/L (ref 135–145)
TRIGL SERPL-MCNC: 94 MG/DL (ref 0–149)

## 2021-06-18 PROCEDURE — 84153 ASSAY OF PSA TOTAL: CPT

## 2021-06-18 PROCEDURE — 80061 LIPID PANEL: CPT

## 2021-06-18 PROCEDURE — 82570 ASSAY OF URINE CREATININE: CPT

## 2021-06-18 PROCEDURE — 80048 BASIC METABOLIC PNL TOTAL CA: CPT

## 2021-06-18 PROCEDURE — 36415 COLL VENOUS BLD VENIPUNCTURE: CPT

## 2021-06-18 PROCEDURE — 82043 UR ALBUMIN QUANTITATIVE: CPT

## 2021-06-21 ENCOUNTER — APPOINTMENT (RX ONLY)
Dept: URBAN - METROPOLITAN AREA CLINIC 4 | Facility: CLINIC | Age: 71
Setting detail: DERMATOLOGY
End: 2021-06-21

## 2021-06-21 DIAGNOSIS — L57.0 ACTINIC KERATOSIS: ICD-10-CM

## 2021-06-21 DIAGNOSIS — L82.1 OTHER SEBORRHEIC KERATOSIS: ICD-10-CM

## 2021-06-21 DIAGNOSIS — D18.0 HEMANGIOMA: ICD-10-CM

## 2021-06-21 DIAGNOSIS — D22 MELANOCYTIC NEVI: ICD-10-CM

## 2021-06-21 DIAGNOSIS — L81.4 OTHER MELANIN HYPERPIGMENTATION: ICD-10-CM

## 2021-06-21 PROBLEM — D22.62 MELANOCYTIC NEVI OF LEFT UPPER LIMB, INCLUDING SHOULDER: Status: ACTIVE | Noted: 2021-06-21

## 2021-06-21 PROBLEM — D18.01 HEMANGIOMA OF SKIN AND SUBCUTANEOUS TISSUE: Status: ACTIVE | Noted: 2021-06-21

## 2021-06-21 PROBLEM — D22.61 MELANOCYTIC NEVI OF RIGHT UPPER LIMB, INCLUDING SHOULDER: Status: ACTIVE | Noted: 2021-06-21

## 2021-06-21 PROBLEM — D22.5 MELANOCYTIC NEVI OF TRUNK: Status: ACTIVE | Noted: 2021-06-21

## 2021-06-21 PROCEDURE — ? COUNSELING

## 2021-06-21 PROCEDURE — ? LIQUID NITROGEN

## 2021-06-21 PROCEDURE — 17003 DESTRUCT PREMALG LES 2-14: CPT

## 2021-06-21 PROCEDURE — 17000 DESTRUCT PREMALG LESION: CPT

## 2021-06-21 PROCEDURE — 99203 OFFICE O/P NEW LOW 30 MIN: CPT | Mod: 25

## 2021-06-21 ASSESSMENT — LOCATION SIMPLE DESCRIPTION DERM
LOCATION SIMPLE: ABDOMEN
LOCATION SIMPLE: CHEST
LOCATION SIMPLE: LEFT UPPER ARM
LOCATION SIMPLE: LEFT CHEEK
LOCATION SIMPLE: RIGHT CHEEK
LOCATION SIMPLE: RIGHT UPPER BACK
LOCATION SIMPLE: RIGHT UPPER ARM
LOCATION SIMPLE: NOSE
LOCATION SIMPLE: POSTERIOR SCALP

## 2021-06-21 ASSESSMENT — LOCATION ZONE DERM
LOCATION ZONE: TRUNK
LOCATION ZONE: SCALP
LOCATION ZONE: FACE
LOCATION ZONE: NOSE
LOCATION ZONE: ARM

## 2021-06-21 ASSESSMENT — LOCATION DETAILED DESCRIPTION DERM
LOCATION DETAILED: RIGHT DISTAL POSTERIOR UPPER ARM
LOCATION DETAILED: RIGHT PROXIMAL POSTERIOR UPPER ARM
LOCATION DETAILED: RIGHT SUPERIOR CENTRAL BUCCAL CHEEK
LOCATION DETAILED: POSTERIOR MID-PARIETAL SCALP
LOCATION DETAILED: LEFT CENTRAL MALAR CHEEK
LOCATION DETAILED: LEFT ANTERIOR DISTAL UPPER ARM
LOCATION DETAILED: RIGHT ANTERIOR DISTAL UPPER ARM
LOCATION DETAILED: EPIGASTRIC SKIN
LOCATION DETAILED: LEFT ANTERIOR PROXIMAL UPPER ARM
LOCATION DETAILED: STERNUM
LOCATION DETAILED: NASAL DORSUM
LOCATION DETAILED: RIGHT ANTERIOR PROXIMAL UPPER ARM
LOCATION DETAILED: LEFT DISTAL POSTERIOR UPPER ARM
LOCATION DETAILED: LEFT PROXIMAL POSTERIOR UPPER ARM
LOCATION DETAILED: LEFT MID PREAURICULAR CHEEK
LOCATION DETAILED: LEFT INFERIOR CENTRAL MALAR CHEEK
LOCATION DETAILED: RIGHT SUPERIOR UPPER BACK

## 2021-06-28 ENCOUNTER — HOSPITAL ENCOUNTER (OUTPATIENT)
Facility: MEDICAL CENTER | Age: 71
End: 2021-06-28
Attending: FAMILY MEDICINE
Payer: MEDICARE

## 2021-06-28 PROCEDURE — 82274 ASSAY TEST FOR BLOOD FECAL: CPT

## 2021-07-02 ENCOUNTER — PATIENT OUTREACH (OUTPATIENT)
Dept: HEALTH INFORMATION MANAGEMENT | Facility: OTHER | Age: 71
End: 2021-07-02

## 2021-07-02 DIAGNOSIS — Z12.11 SCREENING FOR COLON CANCER: ICD-10-CM

## 2021-07-02 LAB — AMBIGUOUS DTTM AMBI4: NORMAL

## 2021-07-02 NOTE — NON-PROVIDER
Outcome: Left Message / Comprehensive Health Assessment.    Attempt # 1    Left voice message regarding Intro to SCP  and  Comprehensive Health Assessment.

## 2021-07-05 LAB — IMM ASSAY OCC BLD FITOB: NEGATIVE

## 2021-07-06 ENCOUNTER — TELEPHONE (OUTPATIENT)
Dept: MEDICAL GROUP | Facility: MEDICAL CENTER | Age: 71
End: 2021-07-06

## 2021-07-06 NOTE — TELEPHONE ENCOUNTER
Phone Number Called: [phone] (home)     Call outcome: Did not leave a detailed message. Requested patient to call back.    Message: LVM for pt to give us a call back in regards to his lab results.

## 2021-07-07 ENCOUNTER — PATIENT MESSAGE (OUTPATIENT)
Dept: MEDICAL GROUP | Facility: MEDICAL CENTER | Age: 71
End: 2021-07-07

## 2021-07-07 NOTE — TELEPHONE ENCOUNTER
From: Uday Osman  To: Physician Jesi Najera  Sent: 7/7/2021 12:11 PM PDT  Subject: Non-Urgent Medical Question    I returned your call about discussing results of ??? I couldn't understand the voicemail. Please call me or just message me the info. Thanks, Uday Osman [phone]

## 2021-07-07 NOTE — TELEPHONE ENCOUNTER
----- Message from Jesi Najera M.D. sent at 7/6/2021 10:43 AM PDT -----  All labs reviewed and normal including the absence of microalbumin in his urine indicating no renal disease

## 2021-07-07 NOTE — PATIENT COMMUNICATION
.Phone Number Called: [phone] (home)     Call outcome: Left detailed message for patient. Informed to call back with any additional questions.    Message: Left message All labs reviewed and normal including the absence of microalbumin in his urine indicating no renal disease

## 2021-07-28 ENCOUNTER — OFFICE VISIT (OUTPATIENT)
Dept: MEDICAL GROUP | Facility: MEDICAL CENTER | Age: 71
End: 2021-07-28
Payer: MEDICARE

## 2021-07-28 VITALS
RESPIRATION RATE: 16 BRPM | HEIGHT: 69 IN | BODY MASS INDEX: 27.1 KG/M2 | WEIGHT: 182.98 LBS | OXYGEN SATURATION: 98 % | HEART RATE: 70 BPM | DIASTOLIC BLOOD PRESSURE: 68 MMHG | SYSTOLIC BLOOD PRESSURE: 140 MMHG | TEMPERATURE: 98.4 F

## 2021-07-28 DIAGNOSIS — R03.0 ELEVATED BLOOD PRESSURE READING: ICD-10-CM

## 2021-07-28 DIAGNOSIS — M25.562 LEFT ANTERIOR KNEE PAIN: ICD-10-CM

## 2021-07-28 PROCEDURE — 99213 OFFICE O/P EST LOW 20 MIN: CPT | Performed by: FAMILY MEDICINE

## 2021-07-28 NOTE — PROGRESS NOTES
This medical record contains text that has been entered with the assistance of computer voice recognition and dictation software.  Therefore, it may contain unintended errors in text, spelling, punctuation, or grammar        Chief Complaint   Patient presents with   • Other     left knee swells up when he is as active as he wants to, right is affected somewhat but left is worse       Uday Osman is a 70 y.o. male here evaluation and management of:       L knee swells up   No injury or inciting event   [place] hurts   Anterior knee pain   Biking helps   x1-2 mo   [place] coming [place] 6 weeks       Current Outpatient Medications   Medication Sig Dispense Refill   • coenzyme Q-10 30 MG capsule Take 60 mg by mouth every day.     • psyllium (METAMUCIL) 58.12 % Pack Take 1 Packet by mouth 2 times a day.     • atorvastatin (LIPITOR) 40 MG Tab Take 1 tablet by mouth every day. 90 tablet 3   • cetirizine (ZYRTEC) 10 MG Tab Take 10 mg by mouth every day.     • aspirin EC (ECOTRIN) 81 MG TBEC Take 81 mg by mouth every day.     • Cholecalciferol (VITAMIN D3) 2000 UNIT CAPS Take  by mouth every day.     • ascorbic acid (ASCORBIC ACID) 500 MG TABS Take 500 mg by mouth every day.     • Multiple Vitamins-Minerals (MULTIVITAMIN PO) Take 1 Tab by mouth.     • Glucosamine-Chondroit-Vit C-Mn (GLUCOSAMINE 1500 COMPLEX PO) Take 2 Tabs by mouth every day.     • acetaZOLAMIDE (DIAMOX) 250 MG Tab Take 1 tablet by mouth 2 times a day as needed. 60 tablet 3     No current facility-administered medications for this visit.     Patient Active Problem List    Diagnosis Date Noted   • Left anterior knee pain 07/28/2021   • Effects of high altitude 06/17/2021   • Elevated blood pressure reading 06/17/2021   • Medicare annual wellness visit, subsequent 06/17/2021   • History of fever 06/25/2020   • Mixed hyperlipidemia 03/21/2018   • Annual physical exam 03/21/2018   • Diverticulosis of large intestine without hemorrhage 03/21/2018   •  "Ventral hernia 2015     Past Surgical History:   Procedure Laterality Date   • VENTRAL HERNIA REPAIR  2015    Performed by Sánchez Mayorga M.D. at SURGERY [facility]   • COLONOSCOPY  2013    X 2   • OTHER ORTHOPEDIC SURGERY      fx finger/ age 15      Social History     Tobacco Use   • Smoking status: Former Smoker     Packs/day: 1.00     Years: 25.00     Pack years: 25.00     Types: Cigarettes     Quit date: 1990     Years since quittin.5   • Smokeless tobacco: Never Used   Vaping Use   • Vaping Use: Never used   Substance Use Topics   • Alcohol use: Yes     Comment: rarely   • Drug use: Yes     Types: Marijuana, Oral     Comment: rare     No family history on file.        ROS    all review of system completed and negative except for those listed above     Objective:     /68 (BP Location: Left arm, Patient Position: Sitting, BP Cuff Size: Adult)   Pulse 70   Temp 36.9 °C (98.4 °F) (Temporal)   Resp 16   Ht 1.753 m (5' 9\")   Wt 83 kg (182 lb 15.7 oz)   SpO2 98%  Body mass index is 27.02 kg/m².  Physical Exam:    Constitutional: Alert, no distress.  Skin: Warm, dry, good turgor, no rashes in visible areas.  Eye: Equal, round and reactive, conjunctiva clear, lids normal.  ENMT: Lips without lesions, good dentition, oropharynx clear.  Neck: Trachea midline, no masses, no thyromegaly. No cervical or supraclavicular lymphadenopathy.  Respiratory: Unlabored respiratory effort, lungs clear to auscultation, no wheezes, no ronchi.  Cardiovascular: Normal S1, S2, no murmur, no edema.  Abdomen: Soft, non-tender, no masses, no hepatosplenomegaly.  Psych: Alert and oriented x3, normal affect and mood.          Assessment and Plan:   The following treatment plan was discussed        Problem List Items Addressed This Visit     Left anterior knee pain     I believe that he may expect improvement in his symptoms with conservative therapy   Knee physiology mechanics and common pathophysiology " discussed   Hand out provided with home PT program   I encourage him to cross train especially now but regularly long term with bike stair squats and stretches    Since his [place] trip is coming up I also encourage him to consult with [facility] to discuss role of steroid injection       20+ min spent                          Instructed to follow up if symptoms worsen or fail to improve, ER/UC precautions discussed as well    Jesi Najera MD  [facility], Family Medicine   [address] [place] [address] [address]   [place] [id number]  Phone: [phone]

## 2021-07-28 NOTE — ASSESSMENT & PLAN NOTE
I believe that he may expect improvement in his symptoms with conservative therapy   Knee physiology mechanics and common pathophysiology discussed   Hand out provided with home PT program   I encourage him to cross train especially now but regularly long term with bike stair squats and stretches    Since his [place] trip is coming up I also encourage him to consult with [facility] to discuss role of steroid injection       30+ min spent

## 2021-08-03 ENCOUNTER — HOSPITAL ENCOUNTER (OUTPATIENT)
Dept: RADIOLOGY | Facility: MEDICAL CENTER | Age: 71
End: 2021-08-03
Attending: FAMILY MEDICINE
Payer: MEDICARE

## 2021-08-03 DIAGNOSIS — M25.562 LEFT ANTERIOR KNEE PAIN: ICD-10-CM

## 2021-08-03 PROCEDURE — 73565 X-RAY EXAM OF KNEES: CPT

## 2021-08-18 SDOH — HEALTH STABILITY: PHYSICAL HEALTH: ON AVERAGE, HOW MANY DAYS PER WEEK DO YOU ENGAGE IN MODERATE TO STRENUOUS EXERCISE (LIKE A BRISK WALK)?: 4 DAYS

## 2021-08-18 SDOH — ECONOMIC STABILITY: FOOD INSECURITY: WITHIN THE PAST 12 MONTHS, THE FOOD YOU BOUGHT JUST DIDN'T LAST AND YOU DIDN'T HAVE MONEY TO GET MORE.: NEVER TRUE

## 2021-08-18 SDOH — ECONOMIC STABILITY: TRANSPORTATION INSECURITY
IN THE PAST 12 MONTHS, HAS LACK OF RELIABLE TRANSPORTATION KEPT YOU FROM MEDICAL APPOINTMENTS, MEETINGS, WORK OR FROM GETTING THINGS NEEDED FOR DAILY LIVING?: NO

## 2021-08-18 SDOH — ECONOMIC STABILITY: TRANSPORTATION INSECURITY
IN THE PAST 12 MONTHS, HAS LACK OF TRANSPORTATION KEPT YOU FROM MEETINGS, WORK, OR FROM GETTING THINGS NEEDED FOR DAILY LIVING?: NO

## 2021-08-18 SDOH — ECONOMIC STABILITY: TRANSPORTATION INSECURITY
IN THE PAST 12 MONTHS, HAS THE LACK OF TRANSPORTATION KEPT YOU FROM MEDICAL APPOINTMENTS OR FROM GETTING MEDICATIONS?: NO

## 2021-08-18 SDOH — ECONOMIC STABILITY: HOUSING INSECURITY
IN THE LAST 12 MONTHS, WAS THERE A TIME WHEN YOU DID NOT HAVE A STEADY PLACE TO SLEEP OR SLEPT IN A SHELTER (INCLUDING NOW)?: NO

## 2021-08-18 SDOH — ECONOMIC STABILITY: FOOD INSECURITY: WITHIN THE PAST 12 MONTHS, YOU WORRIED THAT YOUR FOOD WOULD RUN OUT BEFORE YOU GOT MONEY TO BUY MORE.: NEVER TRUE

## 2021-08-18 SDOH — ECONOMIC STABILITY: HOUSING INSECURITY: IN THE LAST 12 MONTHS, HOW MANY PLACES HAVE YOU LIVED?: 1

## 2021-08-18 SDOH — ECONOMIC STABILITY: INCOME INSECURITY: HOW HARD IS IT FOR YOU TO PAY FOR THE VERY BASICS LIKE FOOD, HOUSING, MEDICAL CARE, AND HEATING?: NOT HARD AT ALL

## 2021-08-18 SDOH — HEALTH STABILITY: MENTAL HEALTH
STRESS IS WHEN SOMEONE FEELS TENSE, NERVOUS, ANXIOUS, OR CAN'T SLEEP AT NIGHT BECAUSE THEIR MIND IS TROUBLED. HOW STRESSED ARE YOU?: ONLY A LITTLE

## 2021-08-18 SDOH — ECONOMIC STABILITY: INCOME INSECURITY: IN THE LAST 12 MONTHS, WAS THERE A TIME WHEN YOU WERE NOT ABLE TO PAY THE MORTGAGE OR RENT ON TIME?: NO

## 2021-08-18 SDOH — HEALTH STABILITY: PHYSICAL HEALTH: ON AVERAGE, HOW MANY MINUTES DO YOU ENGAGE IN EXERCISE AT THIS LEVEL?: 30 MIN

## 2021-08-18 ASSESSMENT — SOCIAL DETERMINANTS OF HEALTH (SDOH)
HOW OFTEN DO YOU ATTENT MEETINGS OF THE CLUB OR ORGANIZATION YOU BELONG TO?: PATIENT DECLINED
HOW OFTEN DO YOU ATTENT MEETINGS OF THE CLUB OR ORGANIZATION YOU BELONG TO?: PATIENT DECLINED
HOW OFTEN DO YOU ATTEND CHURCH OR RELIGIOUS SERVICES?: NEVER
IN A TYPICAL WEEK, HOW MANY TIMES DO YOU TALK ON THE PHONE WITH FAMILY, FRIENDS, OR NEIGHBORS?: ONCE A WEEK
HOW OFTEN DO YOU GET TOGETHER WITH FRIENDS OR RELATIVES?: ONCE A WEEK
HOW MANY DRINKS CONTAINING ALCOHOL DO YOU HAVE ON A TYPICAL DAY WHEN YOU ARE DRINKING: 1 OR 2
HOW OFTEN DO YOU ATTEND CHURCH OR RELIGIOUS SERVICES?: NEVER
IN A TYPICAL WEEK, HOW MANY TIMES DO YOU TALK ON THE PHONE WITH FAMILY, FRIENDS, OR NEIGHBORS?: ONCE A WEEK
DO YOU BELONG TO ANY CLUBS OR ORGANIZATIONS SUCH AS CHURCH GROUPS UNIONS, FRATERNAL OR ATHLETIC GROUPS, OR SCHOOL GROUPS?: NO
WITHIN THE PAST 12 MONTHS, YOU WORRIED THAT YOUR FOOD WOULD RUN OUT BEFORE YOU GOT THE MONEY TO BUY MORE: NEVER TRUE
HOW OFTEN DO YOU GET TOGETHER WITH FRIENDS OR RELATIVES?: ONCE A WEEK
DO YOU BELONG TO ANY CLUBS OR ORGANIZATIONS SUCH AS CHURCH GROUPS UNIONS, FRATERNAL OR ATHLETIC GROUPS, OR SCHOOL GROUPS?: NO
HOW OFTEN DO YOU HAVE SIX OR MORE DRINKS ON ONE OCCASION: NEVER
HOW HARD IS IT FOR YOU TO PAY FOR THE VERY BASICS LIKE FOOD, HOUSING, MEDICAL CARE, AND HEATING?: NOT HARD AT ALL
HOW OFTEN DO YOU HAVE A DRINK CONTAINING ALCOHOL: 2-4 TIMES A MONTH

## 2021-08-18 ASSESSMENT — LIFESTYLE VARIABLES
HOW OFTEN DO YOU HAVE SIX OR MORE DRINKS ON ONE OCCASION: NEVER
HOW MANY STANDARD DRINKS CONTAINING ALCOHOL DO YOU HAVE ON A TYPICAL DAY: 1 OR 2
HOW OFTEN DO YOU HAVE A DRINK CONTAINING ALCOHOL: 2-4 TIMES A MONTH

## 2021-08-19 ENCOUNTER — OFFICE VISIT (OUTPATIENT)
Dept: MEDICAL GROUP | Facility: CLINIC | Age: 71
End: 2021-08-19
Payer: MEDICARE

## 2021-08-19 VITALS
WEIGHT: 182.98 LBS | OXYGEN SATURATION: 96 % | RESPIRATION RATE: 18 BRPM | HEIGHT: 69 IN | SYSTOLIC BLOOD PRESSURE: 122 MMHG | HEART RATE: 86 BPM | TEMPERATURE: 98.8 F | BODY MASS INDEX: 27.1 KG/M2 | DIASTOLIC BLOOD PRESSURE: 80 MMHG

## 2021-08-19 DIAGNOSIS — M17.0 PRIMARY OSTEOARTHRITIS OF BOTH KNEES: ICD-10-CM

## 2021-08-19 PROCEDURE — 99213 OFFICE O/P EST LOW 20 MIN: CPT | Mod: 25 | Performed by: FAMILY MEDICINE

## 2021-08-19 PROCEDURE — 20610 DRAIN/INJ JOINT/BURSA W/O US: CPT | Mod: LT | Performed by: FAMILY MEDICINE

## 2021-08-19 RX ORDER — TRIAMCINOLONE ACETONIDE 40 MG/ML
40 INJECTION, SUSPENSION INTRA-ARTICULAR; INTRAMUSCULAR ONCE
Status: COMPLETED | OUTPATIENT
Start: 2021-08-19 | End: 2021-08-19

## 2021-08-19 RX ADMIN — TRIAMCINOLONE ACETONIDE 40 MG: 40 INJECTION, SUSPENSION INTRA-ARTICULAR; INTRAMUSCULAR at 11:58

## 2021-08-19 NOTE — Clinical Note
Hernando Dennison,  Thank you for referring Uday to our sports medicine clinic.  We did proceed with a corticosteroid injection of the LEFT knee.  Hopefully this will help his upcoming lorraine.  Hope you are well!  L

## 2021-08-19 NOTE — PROGRESS NOTES
CHIEF COMPLAINT:  Uday Osman male presenting at the request of Jesi Najera MD for evaluation of knee pain.     Uday Osman is complaining of left knee pain  present for 1 month  No specific injury, but pain started during a challenging hike  Had some swelling and difficulty bending the LEFT knee and difficulty getting into his car after the swelling began  Pain is at the anteromedial knee  Quality is aching  Pain is non-radiating   Improved with resting  Worse with walking downhill going downstairs  previous knee pain 20 yrs ago, had been on his knees a lot and had swelling and stiffness back then   Prior Treatments: seen by PCP  Prior studies: X-Ray   Medications tried for pain include: ibuprofen (OTC), which helps some  Mechanical Symptom history: No Locking    Retired [employer] work  Likes hiking, golfing and travel    REVIEW OF SYSTEMS  No Nausea, No Vomiting, No Chest Pain, No Shortness of Breath, No Dizziness, No Headache      PAST MEDICAL HISTORY:   History reviewed. No pertinent past medical history.    PMH:  has a past medical history of Cold (3/15/15), High cholesterol, and Snoring.  MEDS:   Current Outpatient Medications:   •  coenzyme Q-10 30 MG capsule, Take 60 mg by mouth every day., Disp: , Rfl:   •  psyllium (METAMUCIL) 58.12 % Pack, Take 1 Packet by mouth 2 times a day., Disp: , Rfl:   •  acetaZOLAMIDE (DIAMOX) 250 MG Tab, Take 1 tablet by mouth 2 times a day as needed., Disp: 60 tablet, Rfl: 3  •  atorvastatin (LIPITOR) 40 MG Tab, Take 1 tablet by mouth every day., Disp: 90 tablet, Rfl: 3  •  cetirizine (ZYRTEC) 10 MG Tab, Take 10 mg by mouth every day., Disp: , Rfl:   •  aspirin EC (ECOTRIN) 81 MG TBEC, Take 81 mg by mouth every day., Disp: , Rfl:   •  Cholecalciferol (VITAMIN D3) 2000 UNIT CAPS, Take  by mouth every day., Disp: , Rfl:   •  ascorbic acid (ASCORBIC ACID) 500 MG TABS, Take 500 mg by mouth every day., Disp: , Rfl:   •  Multiple Vitamins-Minerals (MULTIVITAMIN  "PO), Take 1 Tab by mouth., Disp: , Rfl:   •  Glucosamine-Chondroit-Vit C-Mn (GLUCOSAMINE 1500 COMPLEX PO), Take 2 Tabs by mouth every day., Disp: , Rfl:   ALLERGIES: No Known Allergies  SURGHX:   Past Surgical History:   Procedure Laterality Date   • VENTRAL HERNIA REPAIR  4/22/2015    Performed by Sánchez Mayorga M.D. at SURGERY [facility]   • COLONOSCOPY  2013    X 2   • OTHER ORTHOPEDIC SURGERY      fx finger/ age 15     SOCHX:  reports that he quit smoking about 31 years ago. His smoking use included cigarettes. He has a 25.00 pack-year smoking history. He has never used smokeless tobacco. He reports current alcohol use. He reports current drug use. Drugs: Marijuana and Oral.  FH: Family history was reviewed, no pertinent findings to report     PHYSICAL EXAM:  /80 (BP Location: Left arm, Patient Position: Sitting, BP Cuff Size: Adult)   Pulse 86   Temp 37.1 °C (98.8 °F) (Temporal)   Resp 18   Ht 1.753 m (5' 9\")   Wt 83 kg (182 lb 15.7 oz)   SpO2 96%   BMI 27.02 kg/m²      well-developed, well-nourished in no apparent distress, alert and oriented x 3.  Gait: Minimally antalgic     RIGHT Knee:  Slight Varus and No Swelling  Range of Motion Intact  Trace effusion  Patellar No tenderness and no apprehension  Medial Joint Line Tenderness and NEGATIVE Hans  Lateral Joint Line Non-tender and NEGATIVE Hans  Trace Laxity with Varus stress  Trace Laxity with Valgus stress  Lachman's testing is Trace  Posterior Drawer Testing is Trace  The leg is otherwise neurovascularly intact    LEFT Knee:  Slight Varus and No Swelling   Range of Motion Pain at extremes of motion  Trace effusion  Patellar No tenderness and no apprehension  Medial Joint Line Tenderness and NEGATIVE Hans  Lateral Joint Line Tenderness and NEGATIVE Hans  Trace Laxity with Varus stress  Trace Laxity with Valgus stress  Lachman's testing is Trace  Posterior Drawer Testing is Trace  The leg is otherwise neurovascularly " intact    Additional Findings: None      1. Primary osteoarthritis of both knees (PAIN worse on LEFT, but right has more OA on x-ray)  triamcinolone acetonide (KENALOG-40) injection 40 mg     present for 1 month  No specific injury, but pain started during a challenging hike  Had some swelling and difficulty bending the LEFT knee and difficulty getting into his car after the swelling began  Pain is at the anteromedial knee    Discussed knee osteoarthritis management options includin.  Joint protection, SAMe and anti-inflammatories   2.  Non-offending activities such as cycling or swimming   3.  Knee bracing, Acupuncture  4. Injection options including corticosteroid, viscosupplementation and stem cell injections  5. Surgical options    Patient has elected to proceed with corticosteroid injection of the LEFT knee which was performed in the office TODAY (2021)    Return in about 4 weeks (around 2021).   To see how he is doing after his LEFT intra-articular corticosteroid injection        8/3/2021 8:20 AM     HISTORY/REASON FOR EXAM:  Bilateral knee pain.        TECHNIQUE/EXAM DESCRIPTION AND NUMBER OF VIEWS:   1 views of the standing bilateral knees.     COMPARISON: None     FINDINGS:  There is no evidence of acute fracture. There is no evidence of malalignment. No bone erosion or bone destruction is identified. There is joint space narrowing and periarticular sclerosis as well as marginal spurring that is most severe in the medial   compartment of the right knee where it is moderate to severe in degree.     IMPRESSION:     1.  There is joint space narrowing and periarticular sclerosis which is most prominent in the medial compartment of the right knee.     2.  No acute fracture or malalignment.        Exam Ended: 21  8:20 AM Last Resulted: 21  9:30 AM        done elsewhere and reviewed independently by me    Thank you Jesi Najera MD for allowing me to participate in caring for  your patient.

## 2021-08-20 NOTE — PROCEDURES
PROCEDURE NOTE:  left Knee corticosteroid injection  Risks and benefits discussed  Informed consent obtained  Knee prepped in sterile fashion utilizing a tsering- inferior approach  40 mg of Kenalog and 5 cc of bupivacaine injected into the knee joint space  Vapocoolant spray was utilized  Patient tolerated the procedure well  Postprocedure care and red flags discussed

## 2021-08-30 ENCOUNTER — PATIENT MESSAGE (OUTPATIENT)
Dept: HEALTH INFORMATION MANAGEMENT | Facility: OTHER | Age: 71
End: 2021-08-30

## 2021-09-02 ENCOUNTER — NON-PROVIDER VISIT (OUTPATIENT)
Dept: MEDICAL GROUP | Facility: MEDICAL CENTER | Age: 71
End: 2021-09-02
Payer: MEDICARE

## 2021-09-02 DIAGNOSIS — Z23 NEED FOR VACCINATION: ICD-10-CM

## 2021-09-02 PROCEDURE — 90750 HZV VACC RECOMBINANT IM: CPT | Performed by: FAMILY MEDICINE

## 2021-09-02 PROCEDURE — 99999 PR NO CHARGE: CPT | Performed by: FAMILY MEDICINE

## 2021-09-02 PROCEDURE — 90471 IMMUNIZATION ADMIN: CPT | Performed by: FAMILY MEDICINE

## 2021-09-02 NOTE — PROGRESS NOTES
"Uday Osman is a 71 y.o. male here for a non-provider visit for:   SHINGRIX (Shingles)    Reason for immunization: continue or complete series started at the office  Immunization records indicate need for vaccine: Yes, confirmed with Epic  Minimum interval has been met for this vaccine: Yes  ABN completed: Not Indicated    VIS Dated  10/30/19 was given to patient: Yes  All IAC Questionnaire questions were answered \"No.\"    Patient tolerated injection and no adverse effects were observed or reported: Yes    Pt scheduled for next dose in series: Not Indicated  "

## 2021-12-23 ENCOUNTER — HOSPITAL ENCOUNTER (OUTPATIENT)
Dept: LAB | Facility: MEDICAL CENTER | Age: 71
End: 2021-12-23
Attending: FAMILY MEDICINE
Payer: MEDICARE

## 2021-12-23 ENCOUNTER — OFFICE VISIT (OUTPATIENT)
Dept: MEDICAL GROUP | Facility: MEDICAL CENTER | Age: 71
End: 2021-12-23

## 2021-12-23 VITALS
OXYGEN SATURATION: 96 % | HEART RATE: 80 BPM | WEIGHT: 182.98 LBS | HEIGHT: 69 IN | TEMPERATURE: 98 F | RESPIRATION RATE: 16 BRPM | DIASTOLIC BLOOD PRESSURE: 64 MMHG | BODY MASS INDEX: 27.1 KG/M2 | SYSTOLIC BLOOD PRESSURE: 128 MMHG

## 2021-12-23 DIAGNOSIS — Z13.1 SCREENING FOR DIABETES MELLITUS (DM): ICD-10-CM

## 2021-12-23 DIAGNOSIS — B49 FUNGUS INFECTION: ICD-10-CM

## 2021-12-23 DIAGNOSIS — Z13.6 SCREENING FOR ISCHEMIC HEART DISEASE: ICD-10-CM

## 2021-12-23 DIAGNOSIS — Z12.5 SPECIAL SCREENING FOR MALIGNANT NEOPLASM OF PROSTATE: ICD-10-CM

## 2021-12-23 LAB
ALBUMIN SERPL BCP-MCNC: 4.3 G/DL (ref 3.2–4.9)
ALP SERPL-CCNC: 83 U/L (ref 30–99)
ALT SERPL-CCNC: 18 U/L (ref 2–50)
AST SERPL-CCNC: 27 U/L (ref 12–45)
BILIRUB CONJ SERPL-MCNC: <0.2 MG/DL (ref 0.1–0.5)
BILIRUB INDIRECT SERPL-MCNC: NORMAL MG/DL (ref 0–1)
BILIRUB SERPL-MCNC: 0.6 MG/DL (ref 0.1–1.5)
PROT SERPL-MCNC: 6.9 G/DL (ref 6–8.2)

## 2021-12-23 PROCEDURE — 99214 OFFICE O/P EST MOD 30 MIN: CPT | Performed by: FAMILY MEDICINE

## 2021-12-23 PROCEDURE — 80076 HEPATIC FUNCTION PANEL: CPT

## 2021-12-23 PROCEDURE — 36415 COLL VENOUS BLD VENIPUNCTURE: CPT

## 2021-12-23 RX ORDER — TERBINAFINE HYDROCHLORIDE 250 MG/1
250 TABLET ORAL DAILY
Qty: 90 TABLET | Refills: 0 | Status: SHIPPED | OUTPATIENT
Start: 2021-12-23 | End: 2022-06-16

## 2021-12-23 NOTE — PROGRESS NOTES
This medical record contains text that has been entered with the assistance of computer voice recognition and dictation software.  Therefore, it may contain unintended errors in text, spelling, punctuation, or grammar        Chief Complaint   Patient presents with   • Other     toenail problem, pt reports having thick toenails and bumps       Uday Osman is a 71 y.o. male here evaluation and management of:     See above   Toe nail issues pain thickening off and on for years       Current Outpatient Medications   Medication Sig Dispense Refill   • terbinafine (LAMISIL) 250 MG Tab Take 1 Tablet by mouth every day. 90 Tablet 0   • coenzyme Q-10 30 MG capsule Take 60 mg by mouth every day.     • psyllium (METAMUCIL) 58.12 % Pack Take 1 Packet by mouth 2 times a day.     • atorvastatin (LIPITOR) 40 MG Tab Take 1 tablet by mouth every day. 90 tablet 3   • cetirizine (ZYRTEC) 10 MG Tab Take 10 mg by mouth every day.     • aspirin EC (ECOTRIN) 81 MG TBEC Take 81 mg by mouth every day.     • Cholecalciferol (VITAMIN D3) 2000 UNIT CAPS Take  by mouth every day.     • ascorbic acid (ASCORBIC ACID) 500 MG TABS Take 500 mg by mouth every day.     • Multiple Vitamins-Minerals (MULTIVITAMIN PO) Take 1 Tab by mouth.     • Glucosamine-Chondroit-Vit C-Mn (GLUCOSAMINE 1500 COMPLEX PO) Take 2 Tabs by mouth every day.     • acetaZOLAMIDE (DIAMOX) 250 MG Tab Take 1 tablet by mouth 2 times a day as needed. 60 tablet 3     No current facility-administered medications for this visit.     Patient Active Problem List    Diagnosis Date Noted   • Fungus infection 12/23/2021   • Left anterior knee pain 07/28/2021   • Effects of high altitude 06/17/2021   • Elevated blood pressure reading 06/17/2021   • Medicare annual wellness visit, subsequent 06/17/2021   • History of fever 06/25/2020   • Mixed hyperlipidemia 03/21/2018   • Annual physical exam 03/21/2018   • Diverticulosis of large intestine without hemorrhage 03/21/2018   • Ventral  "hernia 2015     Past Surgical History:   Procedure Laterality Date   • VENTRAL HERNIA REPAIR  2015    Performed by Sánchez Mayorga M.D. at SURGERY [facility]S   • COLONOSCOPY  2013    X 2   • OTHER ORTHOPEDIC SURGERY      fx finger/ age 15      Social History     Tobacco Use   • Smoking status: Former Smoker     Packs/day: 1.00     Years: 25.00     Pack years: 25.00     Types: Cigarettes     Quit date: 1990     Years since quittin.9   • Smokeless tobacco: Never Used   Vaping Use   • Vaping Use: Never used   Substance Use Topics   • Alcohol use: Yes     Comment: rarely   • Drug use: Yes     Types: Marijuana, Oral     Comment: rare     No family history on file.        ROS    all review of system completed and negative except for those listed above     Objective:     /64 (BP Location: Right arm, Patient Position: Sitting, BP Cuff Size: Adult)   Pulse 80   Temp 36.7 °C (98 °F) (Temporal)   Resp 16   Ht 1.753 m (5' 9\")   Wt 83 kg (182 lb 15.7 oz)   SpO2 96%  Body mass index is 27.02 kg/m².  Physical Exam:      GEN: comfortable, alert and oriented, well nourished, well developed, in no apparent distress   HEENT: NCAT, eyes: pupils equal and reactive, sclera white, EOMIT, good dentition  HEART: limbs warm and well perfused, regular rate, no JVD, no lower extremity edema  LUNGS: speaking in full sentences, not in apparent respiratory distress, no audible wheezes  MSK: normal tone and bulk, no swelling of the joints, gait steady and normal   Skin /toe see below       Assessment and Plan:   The following treatment plan was discussed        Problem List Items Addressed This Visit     Fungus infection     Has diffuse fungal infection toes   Would benefit from manicurial education from podietry per his request referral made   Baseline liver today   Start oral and option to check liver enzymes 1 mo into txt  Risk benefit discussed              Relevant Medications    terbinafine (LAMISIL) 250 " MG Tab    Other Relevant Orders    HEPATIC FUNCTION PANEL    Basic Metabolic Panel    Lipid Profile    PROSTATE SPECIFIC AG SCREENING    Referral to Podiatry    HEPATIC FUNCTION PANEL      Other Visit Diagnoses     Special screening for malignant neoplasm of prostate        Relevant Orders    Basic Metabolic Panel    Lipid Profile    PROSTATE SPECIFIC AG SCREENING    Screening for diabetes mellitus (DM)        Relevant Orders    Basic Metabolic Panel    Lipid Profile    PROSTATE SPECIFIC AG SCREENING    Screening for ischemic heart disease        Relevant Orders    Basic Metabolic Panel    Lipid Profile    PROSTATE SPECIFIC AG SCREENING                Instructed to follow up if symptoms worsen or fail to improve, ER/UC precautions discussed as well    Jesi Najera MD  [facility], [address]   [place] [address]  Phone: [phone]

## 2021-12-23 NOTE — ASSESSMENT & PLAN NOTE
Has diffuse fungal infection toes   Would benefit from manicurial education from podietry per his request referral made   Baseline liver today   Start oral and option to check liver enzymes 1 mo into txt  Risk benefit discussed

## 2022-01-24 ENCOUNTER — PATIENT MESSAGE (OUTPATIENT)
Dept: HEALTH INFORMATION MANAGEMENT | Facility: OTHER | Age: 72
End: 2022-01-24
Payer: MEDICARE

## 2022-02-08 ENCOUNTER — HOSPITAL ENCOUNTER (OUTPATIENT)
Dept: LAB | Facility: MEDICAL CENTER | Age: 72
End: 2022-02-08
Attending: PODIATRIST
Payer: MEDICARE

## 2022-02-08 LAB
ALBUMIN SERPL BCP-MCNC: 4.3 G/DL (ref 3.2–4.9)
ALP SERPL-CCNC: 85 U/L (ref 30–99)
ALT SERPL-CCNC: 17 U/L (ref 2–50)
AST SERPL-CCNC: 33 U/L (ref 12–45)
BILIRUB CONJ SERPL-MCNC: <0.2 MG/DL (ref 0.1–0.5)
BILIRUB INDIRECT SERPL-MCNC: NORMAL MG/DL (ref 0–1)
BILIRUB SERPL-MCNC: 0.6 MG/DL (ref 0.1–1.5)
PROT SERPL-MCNC: 6.8 G/DL (ref 6–8.2)

## 2022-02-08 PROCEDURE — 36415 COLL VENOUS BLD VENIPUNCTURE: CPT

## 2022-02-08 PROCEDURE — 80076 HEPATIC FUNCTION PANEL: CPT

## 2022-02-24 ENCOUNTER — HOSPITAL ENCOUNTER (OUTPATIENT)
Facility: MEDICAL CENTER | Age: 72
End: 2022-02-24
Attending: PHYSICIAN ASSISTANT
Payer: MEDICARE

## 2022-02-24 ENCOUNTER — TELEMEDICINE (OUTPATIENT)
Dept: MEDICAL GROUP | Facility: MEDICAL CENTER | Age: 72
End: 2022-02-24
Payer: MEDICARE

## 2022-02-24 VITALS — HEIGHT: 69 IN | WEIGHT: 185 LBS | BODY MASS INDEX: 27.4 KG/M2

## 2022-02-24 DIAGNOSIS — R43.2 IMPAIRED TASTE: ICD-10-CM

## 2022-02-24 DIAGNOSIS — U07.1 COVID-19: ICD-10-CM

## 2022-02-24 PROCEDURE — 99213 OFFICE O/P EST LOW 20 MIN: CPT | Mod: CS,95 | Performed by: PHYSICIAN ASSISTANT

## 2022-02-24 PROCEDURE — U0005 INFEC AGEN DETEC AMPLI PROBE: HCPCS

## 2022-02-24 PROCEDURE — U0003 INFECTIOUS AGENT DETECTION BY NUCLEIC ACID (DNA OR RNA); SEVERE ACUTE RESPIRATORY SYNDROME CORONAVIRUS 2 (SARS-COV-2) (CORONAVIRUS DISEASE [COVID-19]), AMPLIFIED PROBE TECHNIQUE, MAKING USE OF HIGH THROUGHPUT TECHNOLOGIES AS DESCRIBED BY CMS-2020-01-R: HCPCS

## 2022-02-24 NOTE — PROGRESS NOTES
"This visit was conducted utilizing secure and encrypted videoconferencing equipment, with the patient's consent.    Patient's identity was verified.          Chief Complaint   Patient presents with   • Altered Taste     \"loss of taste\" x 1 month,wants antibody test       HPI:     Uday Osman is a 71 y.o. male. Patient is presenting to discuss:  Wife tested pos for COVID a month ago, pt started loosing his taste around the same of, he has tested neg for COVID 5 time in the last month.  2 times a drive-through and 3 times home test.   Patient is wondering maybe he is the one who had COVID first and gave it to his wife and that is why he has persistent loss of taste.  No fevers, no chills, no SOP or cough      Past medical, surgical, family, and social history is reviewed in Epic chart by me today.   Medications and allergies reviewed and updated in Epic chart by me today.          Objective:     Ht 1.753 m (5' 9\")   Wt 83.9 kg (185 lb)  Body mass index is 27.32 kg/m².   Physical Exam:  Pain: sounds comfortable   Constitutional: Alert, no distress.  Eye: pupils Equal, conjunctiva clear,   Respiratory: Unlabored respiratory effort, speaking in full sentences, no audible wheezes.           Assessment and Plan:   The following treatment plan was discussed    1. COVID-19    - SARS CoV-2 Ab, Total (non-vaccine); Future  - SARS-CoV-2, PCR (In-House); Future    2. Impaired taste    - SARS CoV-2 Ab, Total (non-vaccine); Future  - SARS-CoV-2, PCR (In-House); Future'        F/U prn              "

## 2022-02-25 LAB
COVID ORDER STATUS COVID19: NORMAL
SARS-COV-2 RNA RESP QL NAA+PROBE: NOTDETECTED
SPECIMEN SOURCE: NORMAL

## 2022-02-26 ENCOUNTER — HOSPITAL ENCOUNTER (OUTPATIENT)
Dept: LAB | Facility: MEDICAL CENTER | Age: 72
End: 2022-02-26
Attending: PHYSICIAN ASSISTANT
Payer: MEDICARE

## 2022-02-26 DIAGNOSIS — U07.1 COVID-19: ICD-10-CM

## 2022-02-26 DIAGNOSIS — R43.2 IMPAIRED TASTE: ICD-10-CM

## 2022-02-26 LAB — SARS-COV-2 AB SERPL QL IA: NORMAL

## 2022-02-26 PROCEDURE — 36415 COLL VENOUS BLD VENIPUNCTURE: CPT

## 2022-02-26 PROCEDURE — 86769 SARS-COV-2 COVID-19 ANTIBODY: CPT

## 2022-04-27 ENCOUNTER — TELEPHONE (OUTPATIENT)
Dept: HEALTH INFORMATION MANAGEMENT | Facility: OTHER | Age: 72
End: 2022-04-27
Payer: MEDICARE
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
## 2022-04-27 NOTE — TELEPHONE ENCOUNTER
1. Caller Name: Uday Osman                        Call Back Number: [phone]      How would the patient prefer to be contacted with a response: Mapbarhart message    2. SPECIFIC Action To Be Taken: Referral pending, please sign.    3. Diagnosis/Clinical Reason for Request: Remove 2 skin tags near my eye    4. Specialty & Provider Name/Lab/Imaging Location: [facility]. Dr. Dyer    5. Is appointment scheduled for requested order/referral: no    Patient was informed they will receive a return phone call from the office ONLY if there are any questions before processing their request. Advised to call back if they haven't received a call from the referral department in 5 days.

## 2022-05-23 ENCOUNTER — TELEPHONE (OUTPATIENT)
Dept: HEALTH INFORMATION MANAGEMENT | Facility: OTHER | Age: 72
End: 2022-05-23
Payer: MEDICARE

## 2022-06-08 ENCOUNTER — HOSPITAL ENCOUNTER (OUTPATIENT)
Dept: LAB | Facility: MEDICAL CENTER | Age: 72
End: 2022-06-08
Attending: FAMILY MEDICINE
Payer: MEDICARE

## 2022-06-08 DIAGNOSIS — Z12.5 SPECIAL SCREENING FOR MALIGNANT NEOPLASM OF PROSTATE: ICD-10-CM

## 2022-06-08 DIAGNOSIS — Z13.1 SCREENING FOR DIABETES MELLITUS (DM): ICD-10-CM

## 2022-06-08 DIAGNOSIS — Z13.6 SCREENING FOR ISCHEMIC HEART DISEASE: ICD-10-CM

## 2022-06-08 DIAGNOSIS — B49 FUNGUS INFECTION: ICD-10-CM

## 2022-06-08 LAB
ALBUMIN SERPL BCP-MCNC: 4.1 G/DL (ref 3.2–4.9)
ALP SERPL-CCNC: 87 U/L (ref 30–99)
ALT SERPL-CCNC: 22 U/L (ref 2–50)
ANION GAP SERPL CALC-SCNC: 8 MMOL/L (ref 7–16)
AST SERPL-CCNC: 48 U/L (ref 12–45)
BILIRUB CONJ SERPL-MCNC: <0.2 MG/DL (ref 0.1–0.5)
BILIRUB INDIRECT SERPL-MCNC: ABNORMAL MG/DL (ref 0–1)
BILIRUB SERPL-MCNC: 0.5 MG/DL (ref 0.1–1.5)
BUN SERPL-MCNC: 17 MG/DL (ref 8–22)
CALCIUM SERPL-MCNC: 9 MG/DL (ref 8.5–10.5)
CHLORIDE SERPL-SCNC: 110 MMOL/L (ref 96–112)
CHOLEST SERPL-MCNC: 119 MG/DL (ref 100–199)
CO2 SERPL-SCNC: 23 MMOL/L (ref 20–33)
CREAT SERPL-MCNC: 1.14 MG/DL (ref 0.5–1.4)
CREAT UR-MCNC: 141.55 MG/DL
FASTING STATUS PATIENT QL REPORTED: NORMAL
GFR SERPLBLD CREATININE-BSD FMLA CKD-EPI: 69 ML/MIN/1.73 M 2
GLUCOSE SERPL-MCNC: 99 MG/DL (ref 65–99)
HDLC SERPL-MCNC: 54 MG/DL
LDLC SERPL CALC-MCNC: 55 MG/DL
MICROALBUMIN UR-MCNC: <1.2 MG/DL
MICROALBUMIN/CREAT UR: NORMAL MG/G (ref 0–30)
POTASSIUM SERPL-SCNC: 4.3 MMOL/L (ref 3.6–5.5)
PROT SERPL-MCNC: 6.8 G/DL (ref 6–8.2)
PSA SERPL-MCNC: 3.03 NG/ML (ref 0–4)
SODIUM SERPL-SCNC: 141 MMOL/L (ref 135–145)
TRIGL SERPL-MCNC: 50 MG/DL (ref 0–149)

## 2022-06-08 PROCEDURE — 80061 LIPID PANEL: CPT

## 2022-06-08 PROCEDURE — 82043 UR ALBUMIN QUANTITATIVE: CPT

## 2022-06-08 PROCEDURE — 84153 ASSAY OF PSA TOTAL: CPT

## 2022-06-08 PROCEDURE — 80048 BASIC METABOLIC PNL TOTAL CA: CPT

## 2022-06-08 PROCEDURE — 80076 HEPATIC FUNCTION PANEL: CPT

## 2022-06-08 PROCEDURE — 36415 COLL VENOUS BLD VENIPUNCTURE: CPT

## 2022-06-08 PROCEDURE — 82570 ASSAY OF URINE CREATININE: CPT

## 2022-06-09 ENCOUNTER — TELEPHONE (OUTPATIENT)
Dept: MEDICAL GROUP | Facility: MEDICAL CENTER | Age: 72
End: 2022-06-09
Payer: MEDICARE

## 2022-06-09 NOTE — TELEPHONE ENCOUNTER
----- Message from Jesi Najera M.D. sent at 6/9/2022  3:12 PM PDT -----  We ought to schedule a visit to discuss his elevated liver enzymes

## 2022-06-16 ENCOUNTER — OFFICE VISIT (OUTPATIENT)
Dept: MEDICAL GROUP | Facility: MEDICAL CENTER | Age: 72
End: 2022-06-16
Payer: MEDICARE

## 2022-06-16 VITALS
SYSTOLIC BLOOD PRESSURE: 128 MMHG | RESPIRATION RATE: 16 BRPM | HEIGHT: 69 IN | BODY MASS INDEX: 27.43 KG/M2 | DIASTOLIC BLOOD PRESSURE: 68 MMHG | WEIGHT: 185.19 LBS | OXYGEN SATURATION: 98 % | TEMPERATURE: 96.9 F | HEART RATE: 56 BPM

## 2022-06-16 DIAGNOSIS — Z00.00 MEDICARE ANNUAL WELLNESS VISIT, SUBSEQUENT: ICD-10-CM

## 2022-06-16 DIAGNOSIS — R74.8 ELEVATED LIVER ENZYMES: ICD-10-CM

## 2022-06-16 PROCEDURE — G0439 PPPS, SUBSEQ VISIT: HCPCS | Performed by: FAMILY MEDICINE

## 2022-06-16 ASSESSMENT — PATIENT HEALTH QUESTIONNAIRE - PHQ9: CLINICAL INTERPRETATION OF PHQ2 SCORE: 0

## 2022-06-16 ASSESSMENT — ENCOUNTER SYMPTOMS: GENERAL WELL-BEING: GOOD

## 2022-06-16 ASSESSMENT — ACTIVITIES OF DAILY LIVING (ADL): BATHING_REQUIRES_ASSISTANCE: 0

## 2022-06-16 NOTE — ASSESSMENT & PLAN NOTE
Took antifungal for toenail fungus (which can affect liver) but that was approx 4 mo     Counseled to avoid etoh   Counseled to avoid tylenol     Recheck in about 2 mo   Including infectious panel     If still elevated then we can do RUQ US

## 2022-06-16 NOTE — PROGRESS NOTES
Chief Complaint   Patient presents with   • Annual Wellness Visit         HPI:  Uday is a 71 y.o. here for Medicare Annual Wellness Visit  Problem List Items Addressed This Visit     Medicare annual wellness visit, subsequent     Age appropriate prev health care discussed   Cancer screening discussed   Vaccines discussed   Labs offered   Blood pressure at goal     Anticipatory guidance including SPF and other skin protective measures, dental hygiene and care, regular exercise, diet, stress and family planning advice discussed.               Elevated liver enzymes     Took antifungal for toenail fungus (which can affect liver) but that was approx 4 mo     Counseled to avoid etoh   Counseled to avoid tylenol     Recheck in about 2 mo   Including infectious panel     If still elevated then we can do RUQ US                  Relevant Orders    Comp Metabolic Panel    HEPATITIS PANEL ACUTE(4 COMPONENTS)    US-RUQ            Patient Active Problem List    Diagnosis Date Noted   • Fungus infection 12/23/2021   • Left anterior knee pain 07/28/2021   • Effects of high altitude 06/17/2021   • Elevated blood pressure reading 06/17/2021   • Medicare annual wellness visit, subsequent 06/17/2021   • History of fever 06/25/2020   • Mixed hyperlipidemia 03/21/2018   • Annual physical exam 03/21/2018   • Diverticulosis of large intestine without hemorrhage 03/21/2018   • Ventral hernia 04/22/2015       Current Outpatient Medications   Medication Sig Dispense Refill   • coenzyme Q-10 30 MG capsule Take 60 mg by mouth every day.     • psyllium (METAMUCIL) 58.12 % Pack Take 1 Packet by mouth 2 times a day.     • atorvastatin (LIPITOR) 40 MG Tab Take 1 tablet by mouth every day. 90 tablet 3   • cetirizine (ZYRTEC) 10 MG Tab Take 10 mg by mouth every day.     • aspirin EC (ECOTRIN) 81 MG TBEC Take 81 mg by mouth every day.     • Cholecalciferol (VITAMIN D3) 2000 UNIT CAPS Take  by mouth every day.     • ascorbic acid (ASCORBIC ACID) 500  MG TABS Take 500 mg by mouth every day.     • Multiple Vitamins-Minerals (MULTIVITAMIN PO) Take 1 Tab by mouth.     • Glucosamine-Chondroit-Vit C-Mn (GLUCOSAMINE 1500 COMPLEX PO) Take 2 Tabs by mouth every day.     • terbinafine (LAMISIL) 250 MG Tab Take 1 Tablet by mouth every day. 90 Tablet 0     No current facility-administered medications for this visit.        Patient is taking medications as noted in medication list.  Current supplements as per medication list.     Allergies: Patient has no known allergies.    Current social contact/activities: yes, seeing friends and family     Is patient current with immunizations? Yes.    He  reports that he quit smoking about 32 years ago. His smoking use included cigarettes. He has a 25.00 pack-year smoking history. He has never used smokeless tobacco. He reports current alcohol use. He reports current drug use. Drugs: Marijuana and Oral.  Counseling given: Not Answered        DPA/Advanced directive: Patient has Advanced Directive on file.     ROS:    Gait: Uses no assistive device   Ostomy: No   Other tubes: No   Amputations: No   Chronic oxygen use No   Last eye exam 2021   Wears hearing aids: No   : Denies any urinary leakage during the last 6 months        Depression Screening  Little interest or pleasure in doing things?  0 - not at all  Feeling down, depressed, or hopeless? 0 - not at all  Patient Health Questionnaire Score: 0    If depressive symptoms identified deferred to follow up visit unless specifically addressed in assessment and plan.    Interpretation of PHQ-9 Total Score   Score Severity   1-4 No Depression   5-9 Mild Depression   10-14 Moderate Depression   15-19 Moderately Severe Depression   20-27 Severe Depression    Screening for Cognitive Impairment  Three Minute Recall (daughter, heaven, mountain)  3/3    Juan Manuel clock face with all 12 numbers and set the hands to show 10 past 11.  Yes    If cognitive concerns identified, deferred for follow up  unless specifically addressed in assessment and plan.    Fall Risk Assessment  Has the patient had two or more falls in the last year or any fall with injury in the last year?  No  If fall risk identified, deferred for follow up unless specifically addressed in assessment and plan.    Safety Assessment  Throw rugs on floor.  No  Handrails on all stairs.  Yes  Good lighting in all hallways.  Yes  Difficulty hearing.  Yes  Patient counseled about all safety risks that were identified.    Functional Assessment ADLs  Are there any barriers preventing you from cooking for yourself or meeting nutritional needs?  No.    Are there any barriers preventing you from driving safely or obtaining transportation?  No.    Are there any barriers preventing you from using a telephone or calling for help?  No.    Are there any barriers preventing you from shopping?  No.    Are there any barriers preventing you from taking care of your own finances?  No.    Are there any barriers preventing you from managing your medications?  No.    Are there any barriers preventing you from showering, bathing or dressing yourself?  No.    Are you currently engaging in any exercise or physical activity?  Yes.  Hiking, going to gym   What is your perception of your health?  Good.    Health Maintenance Summary          Overdue - COVID-19 Vaccine (4 - Booster for Moderna series) Overdue since 2/24/2022    10/24/2021  Imm Admin: Moderna SARS-CoV-2 Vaccine    03/15/2021  Imm Admin: Moderna SARS-CoV-2 Vaccine    02/14/2021  Imm Admin: Moderna SARS-CoV-2 Vaccine          COLORECTAL CANCER SCREENING (COLON CANCER SCREENING ANNUAL FIT - Yearly) Due soon on 6/28/2022 06/28/2021  OCCULT BLOOD FECES IMMUNOASSAY          Annual Wellness Visit (Every 366 Days) Next due on 6/18/2022 06/17/2021  Prob Dx: Medicare annual wellness visit, subsequent    06/17/2021  Visit Dx: Medicare annual wellness visit, subsequent          IMM INFLUENZA (Season Ended) Next due  on 2022    10/08/2020  Imm Admin: Influenza Vaccine Adult HD    2019  Imm Admin: Influenza Vaccine Adult HD    2018  Imm Admin: Influenza Vaccine Adult HD    2017  Imm Admin: Influenza Vaccine Adult HD    2016  Imm Admin: Influenza Vaccine Adult HD          IMM DTaP/Tdap/Td Vaccine (3 - Td or Tdap) Next due on 9/15/2030    09/15/2020  Imm Admin: Tdap Vaccine    2010  Imm Admin: Tdap Vaccine          IMM PNEUMOCOCCAL VACCINE: 65+ Years (Series Information) Completed    2017  Imm Admin: Pneumococcal polysaccharide vaccine (PPSV-23)    2016  Imm Admin: Pneumococcal Conjugate Vaccine (Prevnar/PCV-13)          HEPATITIS C SCREENING  Completed    2020  HEP C VIRUS ANTIBODY          IMM ZOSTER VACCINES (Series Information) Completed    2021  Imm Admin: Zoster Vaccine Recombinant (RZV) (SHINGRIX)    2021  Imm Admin: Zoster Vaccine Recombinant (RZV) (SHINGRIX)    2016  Imm Admin: Zoster Vaccine Live (ZVL) (Zostavax) - HISTORICAL DATA          IMM HEP B VACCINE (Series Information) Aged Out    No completion history exists for this topic.          IMM MENINGOCOCCAL VACCINE (MCV4) (Series Information) Aged Out    No completion history exists for this topic.                Patient Care Team:  Jesi Najera M.D. as PCP - General (Family Medicine)  Jesi Najera M.D. as PCP - [facility] Paneled  Nolberto Mares Ass't as [facility]     Social History     Tobacco Use   • Smoking status: Former Smoker     Packs/day: 1.00     Years: 25.00     Pack years: 25.00     Types: Cigarettes     Quit date: 1990     Years since quittin.4   • Smokeless tobacco: Never Used   Vaping Use   • Vaping Use: Never used   Substance Use Topics   • Alcohol use: Yes     Comment: rarely   • Drug use: Yes     Types: Marijuana, Oral     Comment: rare     No family history on file.  He  has a past medical history of Cold (3/15/15), High cholesterol, and  "Snoring.   Past Surgical History:   Procedure Laterality Date   • VENTRAL HERNIA REPAIR  4/22/2015    Performed by Sánchez Mayorga M.D. at SURGERY [facility] ORS   • COLONOSCOPY  2013    X 2   • OTHER ORTHOPEDIC SURGERY      fx finger/ age 15           Exam:     /68 (BP Location: Right arm, Patient Position: Sitting, BP Cuff Size: Adult)   Pulse (!) 56   Temp 36.1 °C (96.9 °F) (Temporal)   Resp 16   Ht 1.753 m (5' 9\")   Wt 84 kg (185 lb 3 oz)   SpO2 98%  Body mass index is 27.35 kg/m².    Hearing excellent.    Dentition good  Alert, oriented in no acute distress.  Eye contact is good, speech goal directed, affect calm      Assessment and Plan. The following treatment and monitoring plan is recommended:    No diagnosis found.      Services suggested: No services needed at this time  Health Care Screening recommendations as per orders if indicated.  Referrals offered: PT/OT/Nutrition counseling/Behavioral Health/Smoking cessation as per orders if indicated.    Discussion today about general wellness and lifestyle habits:    · Prevent falls and reduce trip hazards; Cautioned about securing or removing rugs.  · Have a working fire alarm and carbon monoxide detector;   · Engage in regular physical activity and social activities       Follow-up: No follow-ups on file.  "

## 2022-07-28 RX ORDER — ATORVASTATIN CALCIUM 40 MG/1
40 TABLET, FILM COATED ORAL DAILY
Qty: 100 TABLET | Refills: 3 | Status: SHIPPED | OUTPATIENT
Start: 2022-07-28 | End: 2023-09-05

## 2022-08-18 ENCOUNTER — HOSPITAL ENCOUNTER (OUTPATIENT)
Dept: LAB | Facility: MEDICAL CENTER | Age: 72
End: 2022-08-18
Attending: FAMILY MEDICINE
Payer: MEDICARE

## 2022-08-18 DIAGNOSIS — R74.8 ELEVATED LIVER ENZYMES: ICD-10-CM

## 2022-08-18 LAB
ALBUMIN SERPL BCP-MCNC: 4.2 G/DL (ref 3.2–4.9)
ALBUMIN/GLOB SERPL: 1.4 G/DL
ALP SERPL-CCNC: 100 U/L (ref 30–99)
ALT SERPL-CCNC: 16 U/L (ref 2–50)
ANION GAP SERPL CALC-SCNC: 10 MMOL/L (ref 7–16)
AST SERPL-CCNC: 26 U/L (ref 12–45)
BILIRUB SERPL-MCNC: 0.6 MG/DL (ref 0.1–1.5)
BUN SERPL-MCNC: 21 MG/DL (ref 8–22)
CALCIUM SERPL-MCNC: 8.9 MG/DL (ref 8.5–10.5)
CHLORIDE SERPL-SCNC: 108 MMOL/L (ref 96–112)
CO2 SERPL-SCNC: 22 MMOL/L (ref 20–33)
CREAT SERPL-MCNC: 1.02 MG/DL (ref 0.5–1.4)
GFR SERPLBLD CREATININE-BSD FMLA CKD-EPI: 79 ML/MIN/1.73 M 2
GLOBULIN SER CALC-MCNC: 2.9 G/DL (ref 1.9–3.5)
GLUCOSE SERPL-MCNC: 94 MG/DL (ref 65–99)
HAV IGM SERPL QL IA: NORMAL
HBV CORE IGM SER QL: NORMAL
HBV SURFACE AG SER QL: NORMAL
HCV AB SER QL: NORMAL
POTASSIUM SERPL-SCNC: 4.2 MMOL/L (ref 3.6–5.5)
PROT SERPL-MCNC: 7.1 G/DL (ref 6–8.2)
SODIUM SERPL-SCNC: 140 MMOL/L (ref 135–145)

## 2022-08-18 PROCEDURE — 80053 COMPREHEN METABOLIC PANEL: CPT

## 2022-08-18 PROCEDURE — 80074 ACUTE HEPATITIS PANEL: CPT

## 2022-08-18 PROCEDURE — 36415 COLL VENOUS BLD VENIPUNCTURE: CPT

## 2022-08-30 ENCOUNTER — OFFICE VISIT (OUTPATIENT)
Dept: MEDICAL GROUP | Facility: MEDICAL CENTER | Age: 72
End: 2022-08-30
Payer: MEDICARE

## 2022-08-30 VITALS
BODY MASS INDEX: 29.39 KG/M2 | OXYGEN SATURATION: 98 % | RESPIRATION RATE: 16 BRPM | HEIGHT: 69 IN | SYSTOLIC BLOOD PRESSURE: 122 MMHG | TEMPERATURE: 97.6 F | DIASTOLIC BLOOD PRESSURE: 60 MMHG | WEIGHT: 198.41 LBS

## 2022-08-30 DIAGNOSIS — R03.0 ELEVATED BLOOD PRESSURE READING: ICD-10-CM

## 2022-08-30 DIAGNOSIS — Z71.84 TRAVEL ADVICE ENCOUNTER: ICD-10-CM

## 2022-08-30 DIAGNOSIS — R33.9 URINE RETENTION: ICD-10-CM

## 2022-08-30 DIAGNOSIS — Z71.3 ENCOUNTER FOR WEIGHT LOSS COUNSELING: ICD-10-CM

## 2022-08-30 DIAGNOSIS — Z12.11 SCREENING FOR COLON CANCER: ICD-10-CM

## 2022-08-30 DIAGNOSIS — R74.8 ELEVATED LIVER ENZYMES: ICD-10-CM

## 2022-08-30 PROCEDURE — 99214 OFFICE O/P EST MOD 30 MIN: CPT | Performed by: FAMILY MEDICINE

## 2022-08-30 RX ORDER — SCOLOPAMINE TRANSDERMAL SYSTEM 1 MG/1
1 PATCH, EXTENDED RELEASE TRANSDERMAL
Qty: 4 PATCH | Refills: 3 | Status: SHIPPED | OUTPATIENT
Start: 2022-08-30 | End: 2022-11-29

## 2022-08-30 NOTE — ASSESSMENT & PLAN NOTE
Latest Reference Range & Units 6/8/22 07:48 8/18/22 08:35   AST(SGOT) 12 - 45 U/L 48 (H) 26   ALT(SGPT) 2 - 50 U/L 22 16   Alkaline Phosphatase 30 - 99 U/L 87 100 (H)   (H): Data is abnormally high    Reviewed US from 2021   We can repeat     1 mo follow up         Took antifungal for toenail fungus (which can affect liver) but that was approx 4 mo     Counseled to avoid etoh   Counseled to avoid tylenol

## 2022-08-30 NOTE — PROGRESS NOTES
This medical record contains text that has been entered with the assistance of computer voice recognition and dictation software.  Therefore, it may contain unintended errors in text, spelling, punctuation, or grammar        Chief Complaint   Patient presents with    Medication Refill     Would like scopolamine patches    Lab Results     Results from 8/18. Alk phos still elevated.        Uday Osman is a 72 y.o. male here evaluation and management of:     Follow up elevated liver enzymes   Wants scopolomine patch for travel   Blood pressure high at rooming  Also mentions weight loss attempts       Current Outpatient Medications   Medication Sig Dispense Refill    scopolamine (TRANSDERM-SCOP, 1.5 MG,) 1 mg/72hr PATCH 72 HR Place 1 Patch on the skin every 72 hours. 4 Patch 3    atorvastatin (LIPITOR) 40 MG Tab TAKE 1 TABLET BY MOUTH EVERY  Tablet 3    coenzyme Q-10 30 MG capsule Take 60 mg by mouth every day.      psyllium (METAMUCIL) 58.12 % Pack Take 1 Packet by mouth 2 times a day.      cetirizine (ZYRTEC) 10 MG Tab Take 10 mg by mouth every day.      aspirin EC (ECOTRIN) 81 MG TBEC Take 81 mg by mouth every day.      Cholecalciferol (VITAMIN D3) 2000 UNIT CAPS Take  by mouth every day.      ascorbic acid (ASCORBIC ACID) 500 MG TABS Take 500 mg by mouth every day.      Multiple Vitamins-Minerals (MULTIVITAMIN PO) Take 1 Tab by mouth.      Glucosamine-Chondroit-Vit C-Mn (GLUCOSAMINE 1500 COMPLEX PO) Take 2 Tabs by mouth every day.       No current facility-administered medications for this visit.     Patient Active Problem List    Diagnosis Date Noted    Encounter for weight loss counseling 08/30/2022    Urine retention 08/30/2022    Elevated liver enzymes 06/16/2022    Fungus infection 12/23/2021    Left anterior knee pain 07/28/2021    Effects of high altitude 06/17/2021    Elevated blood pressure reading 06/17/2021    Medicare annual wellness visit, subsequent 06/17/2021    History of fever  "2020    Mixed hyperlipidemia 2018    Annual physical exam 2018    Diverticulosis of large intestine without hemorrhage 2018    Ventral hernia 2015     Past Surgical History:   Procedure Laterality Date    VENTRAL HERNIA REPAIR  2015    Performed by Sánchez Mayorga M.D. at SURGERY [facility]S    COLONOSCOPY  2013    X 2    OTHER ORTHOPEDIC SURGERY      fx finger/ age 15      Social History     Tobacco Use    Smoking status: Former     Packs/day: 1.00     Years: 25.00     Pack years: 25.00     Types: Cigarettes     Quit date: 1990     Years since quittin.6    Smokeless tobacco: Never   Vaping Use    Vaping Use: Never used   Substance Use Topics    Alcohol use: Yes     Comment: rarely    Drug use: Yes     Types: Marijuana, Oral     Comment: rare     No family history on file.        ROS    all review of system completed and negative except for those listed above     Objective:     /60 (BP Location: Left arm, Patient Position: Sitting, BP Cuff Size: Adult)   Temp 36.4 °C (97.6 °F) (Temporal)   Resp 16   Ht 1.753 m (5' 9\")   Wt 90 kg (198 lb 6.6 oz)   SpO2 98%  Body mass index is 29.3 kg/m².  Physical Exam:    Constitutional: Alert, no distress.  Skin: Warm, dry, good turgor, no rashes in visible areas.  Eye: Equal, round and reactive, conjunctiva clear, lids normal.  ENMT: Lips without lesions, good dentition, oropharynx clear.  Neck: Trachea midline, no masses, no thyromegaly. No cervical or supraclavicular lymphadenopathy.  Respiratory: Unlabored respiratory effort, lungs clear to auscultation, no wheezes, no ronchi.  Cardiovascular: Normal S1, S2, no murmur, no edema.  Abdomen: Soft, non-tender, no masses, no hepatosplenomegaly.  Psych: Alert and oriented x3, normal affect and mood.        Assessment and Plan:   The following treatment plan was discussed        Problem List Items Addressed This Visit       Elevated blood pressure reading     Instructions for " obtaining cuff and home blood pressure monitoring   Follow up 1-2 mo       For blood pressure review   No meds today       Improved upon recheck                  Elevated liver enzymes      Latest Reference Range & Units 6/8/22 07:48 8/18/22 08:35   AST(SGOT) 12 - 45 U/L 48 (H) 26   ALT(SGPT) 2 - 50 U/L 22 16   Alkaline Phosphatase 30 - 99 U/L 87 100 (H)   (H): Data is abnormally high    Reviewed US from 2021   We can repeat     1 mo follow up         Took antifungal for toenail fungus (which can affect liver) but that was approx 4 mo     Counseled to avoid etoh   Counseled to avoid tylenol                            Relevant Orders    HEPATIC FUNCTION PANEL    HEPATITIS PANEL ACUTE(4 COMPONENTS)    CCP    Encounter for weight loss counseling     Lost 30 lbs   Then regained 20     We discuss medication options at length   tirzepatide   glp1 agonists   If on formulary     qsymia (option to use mail order )  Phentermine                   Urine retention     Enlarged prostate and urine retention on last abd US   Plan to consult with urology              Relevant Orders    Referral to Urology     Other Visit Diagnoses       Screening for colon cancer        Relevant Orders    OCCULT BLOOD FECES IMMUNOASSAY    Travel advice encounter        Relevant Medications    scopolamine (TRANSDERM-SCOP, 1.5 MG,) 1 mg/72hr PATCH 72 HR                  Instructed to follow up if symptoms worsen or fail to improve, ER/UC precautions discussed as well    Jesi Najera MD  [facility], Family Medicine   [address] [place] [address] [place] [id number]  Phone: [phone]

## 2022-08-30 NOTE — ASSESSMENT & PLAN NOTE
>>ASSESSMENT AND PLAN FOR RETENTION OF URINE WRITTEN ON 8/30/2022 10:29 AM BY HENRIQUE HILLIARD M.D.    Enlarged prostate and urine retention on last abd US   Plan to consult with urology

## 2022-08-30 NOTE — ASSESSMENT & PLAN NOTE
Instructions for obtaining cuff and home blood pressure monitoring   Follow up 1-2 mo       For blood pressure review   No meds today       Improved upon recheck

## 2022-08-31 ENCOUNTER — HOSPITAL ENCOUNTER (OUTPATIENT)
Facility: MEDICAL CENTER | Age: 72
End: 2022-08-31
Attending: FAMILY MEDICINE
Payer: MEDICARE

## 2022-08-31 PROCEDURE — 82274 ASSAY TEST FOR BLOOD FECAL: CPT

## 2022-09-01 DIAGNOSIS — Z12.11 SCREENING FOR COLON CANCER: ICD-10-CM

## 2022-09-03 LAB — IMM ASSAY OCC BLD FITOB: NEGATIVE

## 2022-09-28 ENCOUNTER — HOSPITAL ENCOUNTER (OUTPATIENT)
Dept: RADIOLOGY | Facility: MEDICAL CENTER | Age: 72
End: 2022-09-28
Attending: FAMILY MEDICINE
Payer: MEDICARE

## 2022-09-28 DIAGNOSIS — R74.8 ELEVATED LIVER ENZYMES: ICD-10-CM

## 2022-09-28 PROCEDURE — 76705 ECHO EXAM OF ABDOMEN: CPT

## 2022-09-29 ENCOUNTER — HOSPITAL ENCOUNTER (OUTPATIENT)
Dept: LAB | Facility: MEDICAL CENTER | Age: 72
End: 2022-09-29
Attending: FAMILY MEDICINE
Payer: MEDICARE

## 2022-09-29 DIAGNOSIS — R74.8 ELEVATED LIVER ENZYMES: ICD-10-CM

## 2022-09-29 LAB
ALBUMIN SERPL BCP-MCNC: 4 G/DL (ref 3.2–4.9)
ALP SERPL-CCNC: 91 U/L (ref 30–99)
ALT SERPL-CCNC: 20 U/L (ref 2–50)
AST SERPL-CCNC: 32 U/L (ref 12–45)
BILIRUB CONJ SERPL-MCNC: <0.2 MG/DL (ref 0.1–0.5)
BILIRUB INDIRECT SERPL-MCNC: NORMAL MG/DL (ref 0–1)
BILIRUB SERPL-MCNC: 0.5 MG/DL (ref 0.1–1.5)
HAV IGM SERPL QL IA: NORMAL
HBV CORE IGM SER QL: NORMAL
HBV SURFACE AG SER QL: NORMAL
HCV AB SER QL: NORMAL
PROT SERPL-MCNC: 7 G/DL (ref 6–8.2)

## 2022-09-29 PROCEDURE — 80076 HEPATIC FUNCTION PANEL: CPT

## 2022-09-29 PROCEDURE — 86200 CCP ANTIBODY: CPT

## 2022-09-29 PROCEDURE — 80074 ACUTE HEPATITIS PANEL: CPT

## 2022-09-29 PROCEDURE — 36415 COLL VENOUS BLD VENIPUNCTURE: CPT

## 2022-10-02 LAB — CCP IGG SERPL-ACNC: 3 UNITS (ref 0–19)

## 2022-10-03 ENCOUNTER — HOSPITAL ENCOUNTER (OUTPATIENT)
Facility: MEDICAL CENTER | Age: 72
End: 2022-10-03
Attending: PHYSICIAN ASSISTANT
Payer: MEDICARE

## 2022-10-03 PROCEDURE — 87086 URINE CULTURE/COLONY COUNT: CPT

## 2022-10-06 LAB
BACTERIA UR CULT: NORMAL
SIGNIFICANT IND 70042: NORMAL
SITE SITE: NORMAL
SOURCE SOURCE: NORMAL

## 2022-11-29 ENCOUNTER — PRE-ADMISSION TESTING (OUTPATIENT)
Dept: ADMISSIONS | Facility: MEDICAL CENTER | Age: 72
End: 2022-11-29
Attending: UROLOGY
Payer: MEDICARE

## 2022-11-29 DIAGNOSIS — Z01.812 PRE-OPERATIVE LABORATORY EXAMINATION: ICD-10-CM

## 2022-11-29 DIAGNOSIS — Z01.810 PRE-OPERATIVE CARDIOVASCULAR EXAMINATION: ICD-10-CM

## 2022-11-29 LAB
ANION GAP SERPL CALC-SCNC: 11 MMOL/L (ref 7–16)
BUN SERPL-MCNC: 13 MG/DL (ref 8–22)
CALCIUM SERPL-MCNC: 9.1 MG/DL (ref 8.4–10.2)
CHLORIDE SERPL-SCNC: 103 MMOL/L (ref 96–112)
CO2 SERPL-SCNC: 25 MMOL/L (ref 20–33)
CREAT SERPL-MCNC: 1.2 MG/DL (ref 0.5–1.4)
EKG IMPRESSION: NORMAL
ERYTHROCYTE [DISTWIDTH] IN BLOOD BY AUTOMATED COUNT: 41 FL (ref 35.9–50)
GFR SERPLBLD CREATININE-BSD FMLA CKD-EPI: 65 ML/MIN/1.73 M 2
GLUCOSE SERPL-MCNC: 84 MG/DL (ref 65–99)
HCT VFR BLD AUTO: 43.9 % (ref 42–52)
HGB BLD-MCNC: 14.6 G/DL (ref 14–18)
MCH RBC QN AUTO: 31 PG (ref 27–33)
MCHC RBC AUTO-ENTMCNC: 33.3 G/DL (ref 33.7–35.3)
MCV RBC AUTO: 93.2 FL (ref 81.4–97.8)
PLATELET # BLD AUTO: 205 K/UL (ref 164–446)
PMV BLD AUTO: 10.7 FL (ref 9–12.9)
POTASSIUM SERPL-SCNC: 3.8 MMOL/L (ref 3.6–5.5)
RBC # BLD AUTO: 4.71 M/UL (ref 4.7–6.1)
SODIUM SERPL-SCNC: 139 MMOL/L (ref 135–145)
WBC # BLD AUTO: 7.4 K/UL (ref 4.8–10.8)

## 2022-11-29 PROCEDURE — 80048 BASIC METABOLIC PNL TOTAL CA: CPT

## 2022-11-29 PROCEDURE — 85027 COMPLETE CBC AUTOMATED: CPT

## 2022-11-29 PROCEDURE — 93010 ELECTROCARDIOGRAM REPORT: CPT | Performed by: INTERNAL MEDICINE

## 2022-11-29 PROCEDURE — 93005 ELECTROCARDIOGRAM TRACING: CPT

## 2022-11-29 PROCEDURE — 36415 COLL VENOUS BLD VENIPUNCTURE: CPT

## 2022-11-29 RX ORDER — CHLORAL HYDRATE 500 MG
1200 CAPSULE ORAL DAILY
COMMUNITY
End: 2022-12-25

## 2022-11-29 RX ORDER — TAMSULOSIN HYDROCHLORIDE 0.4 MG/1
0.4 CAPSULE ORAL
COMMUNITY
End: 2023-01-06

## 2022-11-29 RX ORDER — SULFAMETHOXAZOLE AND TRIMETHOPRIM 400; 80 MG/1; MG/1
1 TABLET ORAL 2 TIMES DAILY
COMMUNITY
End: 2022-12-25

## 2022-11-29 NOTE — PREPROCEDURE INSTRUCTIONS
Per anesthesia protocol instructed to take the following medications DOS: Zyrtec, Bactrim.    Pt states UA/Culture done at Dr. Higgins office yesterday.  Has indwelling urinary catheter.  Currently on Bactrim.  UA/C&S not performed today as ordered.  Linda at Dr. Higgins office notified via VM

## 2022-12-02 NOTE — OR NURSING
Noted pt did not have UA/urine culture at [facility]. Called pt to discuss and he states that was because he has a foster cath. Pt states he saw his urologist on Monday and was prescribed an antibiotic to start on Saturday prior to surgery.

## 2022-12-06 ENCOUNTER — ANESTHESIA EVENT (OUTPATIENT)
Dept: SURGERY | Facility: MEDICAL CENTER | Age: 72
End: 2022-12-06
Payer: MEDICARE

## 2022-12-06 ENCOUNTER — ANESTHESIA (OUTPATIENT)
Dept: SURGERY | Facility: MEDICAL CENTER | Age: 72
End: 2022-12-06
Payer: MEDICARE

## 2022-12-06 ENCOUNTER — HOSPITAL ENCOUNTER (OUTPATIENT)
Facility: MEDICAL CENTER | Age: 72
End: 2022-12-07
Attending: UROLOGY | Admitting: UROLOGY
Payer: MEDICARE

## 2022-12-06 DIAGNOSIS — R33.9 URINE RETENTION: Primary | ICD-10-CM

## 2022-12-06 DIAGNOSIS — R33.9 URINARY RETENTION: ICD-10-CM

## 2022-12-06 PROCEDURE — 700101 HCHG RX REV CODE 250: Performed by: ANESTHESIOLOGY

## 2022-12-06 PROCEDURE — 700111 HCHG RX REV CODE 636 W/ 250 OVERRIDE (IP): Performed by: ANESTHESIOLOGY

## 2022-12-06 PROCEDURE — 700105 HCHG RX REV CODE 258: Performed by: UROLOGY

## 2022-12-06 PROCEDURE — 96374 THER/PROPH/DIAG INJ IV PUSH: CPT | Mod: XU

## 2022-12-06 PROCEDURE — 00914 ANES TRURL PX RESCJ PRST8: CPT | Performed by: ANESTHESIOLOGY

## 2022-12-06 PROCEDURE — A9270 NON-COVERED ITEM OR SERVICE: HCPCS | Performed by: UROLOGY

## 2022-12-06 PROCEDURE — 160039 HCHG SURGERY MINUTES - EA ADDL 1 MIN LEVEL 3: Performed by: UROLOGY

## 2022-12-06 PROCEDURE — 99100 ANES PT EXTEME AGE<1 YR&>70: CPT | Performed by: ANESTHESIOLOGY

## 2022-12-06 PROCEDURE — 94760 N-INVAS EAR/PLS OXIMETRY 1: CPT

## 2022-12-06 PROCEDURE — 160048 HCHG OR STATISTICAL LEVEL 1-5: Performed by: UROLOGY

## 2022-12-06 PROCEDURE — 160009 HCHG ANES TIME/MIN: Performed by: UROLOGY

## 2022-12-06 PROCEDURE — 160035 HCHG PACU - 1ST 60 MINS PHASE I: Performed by: UROLOGY

## 2022-12-06 PROCEDURE — 700102 HCHG RX REV CODE 250 W/ 637 OVERRIDE(OP): Performed by: UROLOGY

## 2022-12-06 PROCEDURE — G0378 HOSPITAL OBSERVATION PER HR: HCPCS

## 2022-12-06 PROCEDURE — 160028 HCHG SURGERY MINUTES - 1ST 30 MINS LEVEL 3: Performed by: UROLOGY

## 2022-12-06 PROCEDURE — 700111 HCHG RX REV CODE 636 W/ 250 OVERRIDE (IP): Performed by: UROLOGY

## 2022-12-06 PROCEDURE — 160002 HCHG RECOVERY MINUTES (STAT): Performed by: UROLOGY

## 2022-12-06 PROCEDURE — 88305 TISSUE EXAM BY PATHOLOGIST: CPT | Mod: 59

## 2022-12-06 RX ORDER — SODIUM CHLORIDE, SODIUM LACTATE, POTASSIUM CHLORIDE, CALCIUM CHLORIDE 600; 310; 30; 20 MG/100ML; MG/100ML; MG/100ML; MG/100ML
INJECTION, SOLUTION INTRAVENOUS CONTINUOUS
Status: ACTIVE | OUTPATIENT
Start: 2022-12-06 | End: 2022-12-06

## 2022-12-06 RX ORDER — DIPHENHYDRAMINE HYDROCHLORIDE 50 MG/ML
12.5 INJECTION INTRAMUSCULAR; INTRAVENOUS
Status: DISCONTINUED | OUTPATIENT
Start: 2022-12-06 | End: 2022-12-06 | Stop reason: HOSPADM

## 2022-12-06 RX ORDER — HYDROMORPHONE HYDROCHLORIDE 1 MG/ML
0.1 INJECTION, SOLUTION INTRAMUSCULAR; INTRAVENOUS; SUBCUTANEOUS
Status: DISCONTINUED | OUTPATIENT
Start: 2022-12-06 | End: 2022-12-06 | Stop reason: HOSPADM

## 2022-12-06 RX ORDER — HYDROMORPHONE HYDROCHLORIDE 1 MG/ML
0.2 INJECTION, SOLUTION INTRAMUSCULAR; INTRAVENOUS; SUBCUTANEOUS
Status: DISCONTINUED | OUTPATIENT
Start: 2022-12-06 | End: 2022-12-06 | Stop reason: HOSPADM

## 2022-12-06 RX ORDER — OXYCODONE HYDROCHLORIDE 5 MG/1
5-10 TABLET ORAL EVERY 6 HOURS PRN
Qty: 15 TABLET | Refills: 0 | Status: SHIPPED | OUTPATIENT
Start: 2022-12-06 | End: 2022-12-11

## 2022-12-06 RX ORDER — ATROPA BELLADONNA AND OPIUM 16.2; 6 MG/1; MG/1
60 SUPPOSITORY RECTAL EVERY 12 HOURS PRN
Status: DISCONTINUED | OUTPATIENT
Start: 2022-12-06 | End: 2022-12-07 | Stop reason: HOSPADM

## 2022-12-06 RX ORDER — SODIUM CHLORIDE 9 MG/ML
INJECTION, SOLUTION INTRAVENOUS EVERY 6 HOURS
Status: COMPLETED | OUTPATIENT
Start: 2022-12-06 | End: 2022-12-06

## 2022-12-06 RX ORDER — MIDAZOLAM HYDROCHLORIDE 1 MG/ML
INJECTION INTRAMUSCULAR; INTRAVENOUS PRN
Status: DISCONTINUED | OUTPATIENT
Start: 2022-12-06 | End: 2022-12-06 | Stop reason: SURG

## 2022-12-06 RX ORDER — POLYETHYLENE GLYCOL 3350 17 G/17G
1 POWDER, FOR SOLUTION ORAL DAILY
Status: DISCONTINUED | OUTPATIENT
Start: 2022-12-07 | End: 2022-12-07 | Stop reason: HOSPADM

## 2022-12-06 RX ORDER — DIPHENHYDRAMINE HYDROCHLORIDE 50 MG/ML
25 INJECTION INTRAMUSCULAR; INTRAVENOUS EVERY 6 HOURS PRN
Status: DISCONTINUED | OUTPATIENT
Start: 2022-12-06 | End: 2022-12-07 | Stop reason: HOSPADM

## 2022-12-06 RX ORDER — OXYCODONE HCL 5 MG/5 ML
10 SOLUTION, ORAL ORAL
Status: DISCONTINUED | OUTPATIENT
Start: 2022-12-06 | End: 2022-12-06 | Stop reason: HOSPADM

## 2022-12-06 RX ORDER — DOCUSATE SODIUM 100 MG/1
100 CAPSULE, LIQUID FILLED ORAL 2 TIMES DAILY
Status: DISCONTINUED | OUTPATIENT
Start: 2022-12-06 | End: 2022-12-07 | Stop reason: HOSPADM

## 2022-12-06 RX ORDER — ONDANSETRON 2 MG/ML
4 INJECTION INTRAMUSCULAR; INTRAVENOUS
Status: DISCONTINUED | OUTPATIENT
Start: 2022-12-06 | End: 2022-12-06 | Stop reason: HOSPADM

## 2022-12-06 RX ORDER — ACETAMINOPHEN 500 MG
1000 TABLET ORAL EVERY 6 HOURS PRN
Status: DISCONTINUED | OUTPATIENT
Start: 2022-12-12 | End: 2022-12-07 | Stop reason: HOSPADM

## 2022-12-06 RX ORDER — OXYCODONE HYDROCHLORIDE 10 MG/1
10 TABLET ORAL
Status: DISCONTINUED | OUTPATIENT
Start: 2022-12-06 | End: 2022-12-07 | Stop reason: HOSPADM

## 2022-12-06 RX ORDER — SULFAMETHOXAZOLE AND TRIMETHOPRIM 800; 160 MG/1; MG/1
1 TABLET ORAL 2 TIMES DAILY
Status: DISCONTINUED | OUTPATIENT
Start: 2022-12-06 | End: 2022-12-07 | Stop reason: HOSPADM

## 2022-12-06 RX ORDER — HYDROXYZINE HYDROCHLORIDE 25 MG/1
25 TABLET, FILM COATED ORAL NIGHTLY PRN
Status: DISCONTINUED | OUTPATIENT
Start: 2022-12-06 | End: 2022-12-07 | Stop reason: HOSPADM

## 2022-12-06 RX ORDER — CEFAZOLIN SODIUM 1 G/3ML
INJECTION, POWDER, FOR SOLUTION INTRAMUSCULAR; INTRAVENOUS PRN
Status: DISCONTINUED | OUTPATIENT
Start: 2022-12-06 | End: 2022-12-06 | Stop reason: SURG

## 2022-12-06 RX ORDER — PHENAZOPYRIDINE HYDROCHLORIDE 100 MG/1
100 TABLET, FILM COATED ORAL 3 TIMES DAILY PRN
Qty: 6 TABLET | Refills: 0 | COMMUNITY
End: 2022-12-25

## 2022-12-06 RX ORDER — ACETAMINOPHEN 500 MG
1000 TABLET ORAL EVERY 6 HOURS
Status: DISCONTINUED | OUTPATIENT
Start: 2022-12-07 | End: 2022-12-07 | Stop reason: HOSPADM

## 2022-12-06 RX ORDER — HALOPERIDOL 5 MG/ML
1 INJECTION INTRAMUSCULAR EVERY 6 HOURS PRN
Status: DISCONTINUED | OUTPATIENT
Start: 2022-12-06 | End: 2022-12-07 | Stop reason: HOSPADM

## 2022-12-06 RX ORDER — OXYCODONE HCL 5 MG/5 ML
5 SOLUTION, ORAL ORAL
Status: DISCONTINUED | OUTPATIENT
Start: 2022-12-06 | End: 2022-12-06 | Stop reason: HOSPADM

## 2022-12-06 RX ORDER — HYDROMORPHONE HYDROCHLORIDE 1 MG/ML
0.4 INJECTION, SOLUTION INTRAMUSCULAR; INTRAVENOUS; SUBCUTANEOUS
Status: DISCONTINUED | OUTPATIENT
Start: 2022-12-06 | End: 2022-12-06 | Stop reason: HOSPADM

## 2022-12-06 RX ORDER — DEXAMETHASONE SODIUM PHOSPHATE 4 MG/ML
4 INJECTION, SOLUTION INTRA-ARTICULAR; INTRALESIONAL; INTRAMUSCULAR; INTRAVENOUS; SOFT TISSUE
Status: DISCONTINUED | OUTPATIENT
Start: 2022-12-06 | End: 2022-12-07 | Stop reason: HOSPADM

## 2022-12-06 RX ORDER — LIDOCAINE HYDROCHLORIDE 20 MG/ML
INJECTION, SOLUTION EPIDURAL; INFILTRATION; INTRACAUDAL; PERINEURAL PRN
Status: DISCONTINUED | OUTPATIENT
Start: 2022-12-06 | End: 2022-12-06 | Stop reason: SURG

## 2022-12-06 RX ORDER — MEPERIDINE HYDROCHLORIDE 25 MG/ML
6.25 INJECTION INTRAMUSCULAR; INTRAVENOUS; SUBCUTANEOUS
Status: DISCONTINUED | OUTPATIENT
Start: 2022-12-06 | End: 2022-12-06 | Stop reason: HOSPADM

## 2022-12-06 RX ORDER — KETOROLAC TROMETHAMINE 30 MG/ML
15 INJECTION, SOLUTION INTRAMUSCULAR; INTRAVENOUS EVERY 6 HOURS
Status: DISCONTINUED | OUTPATIENT
Start: 2022-12-07 | End: 2022-12-07 | Stop reason: HOSPADM

## 2022-12-06 RX ORDER — POLYETHYLENE GLYCOL 3350 17 G/17G
17 POWDER, FOR SOLUTION ORAL DAILY
Qty: 14 EACH | Refills: 0 | COMMUNITY
End: 2022-12-25

## 2022-12-06 RX ORDER — OXYCODONE HYDROCHLORIDE 5 MG/1
5 TABLET ORAL
Status: DISCONTINUED | OUTPATIENT
Start: 2022-12-06 | End: 2022-12-07 | Stop reason: HOSPADM

## 2022-12-06 RX ORDER — DOCUSATE SODIUM 100 MG/1
100 CAPSULE, LIQUID FILLED ORAL 2 TIMES DAILY PRN
Qty: 30 CAPSULE | Refills: 0 | COMMUNITY
End: 2024-01-31

## 2022-12-06 RX ORDER — CETIRIZINE HYDROCHLORIDE 10 MG/1
10 TABLET ORAL DAILY
Status: DISCONTINUED | OUTPATIENT
Start: 2022-12-07 | End: 2022-12-07 | Stop reason: HOSPADM

## 2022-12-06 RX ORDER — ONDANSETRON 2 MG/ML
INJECTION INTRAMUSCULAR; INTRAVENOUS PRN
Status: DISCONTINUED | OUTPATIENT
Start: 2022-12-06 | End: 2022-12-06 | Stop reason: SURG

## 2022-12-06 RX ORDER — ATORVASTATIN CALCIUM 40 MG/1
40 TABLET, FILM COATED ORAL DAILY
Status: DISCONTINUED | OUTPATIENT
Start: 2022-12-07 | End: 2022-12-07 | Stop reason: HOSPADM

## 2022-12-06 RX ORDER — HALOPERIDOL 5 MG/ML
1 INJECTION INTRAMUSCULAR
Status: DISCONTINUED | OUTPATIENT
Start: 2022-12-06 | End: 2022-12-06 | Stop reason: HOSPADM

## 2022-12-06 RX ORDER — HYDROMORPHONE HYDROCHLORIDE 1 MG/ML
0.5 INJECTION, SOLUTION INTRAMUSCULAR; INTRAVENOUS; SUBCUTANEOUS
Status: DISCONTINUED | OUTPATIENT
Start: 2022-12-06 | End: 2022-12-07 | Stop reason: HOSPADM

## 2022-12-06 RX ORDER — IBUPROFEN 400 MG/1
800 TABLET ORAL 3 TIMES DAILY PRN
Status: DISCONTINUED | OUTPATIENT
Start: 2022-12-10 | End: 2022-12-07 | Stop reason: HOSPADM

## 2022-12-06 RX ORDER — SCOLOPAMINE TRANSDERMAL SYSTEM 1 MG/1
1 PATCH, EXTENDED RELEASE TRANSDERMAL
Status: DISCONTINUED | OUTPATIENT
Start: 2022-12-06 | End: 2022-12-07 | Stop reason: HOSPADM

## 2022-12-06 RX ORDER — ONDANSETRON 2 MG/ML
4 INJECTION INTRAMUSCULAR; INTRAVENOUS EVERY 4 HOURS PRN
Status: DISCONTINUED | OUTPATIENT
Start: 2022-12-06 | End: 2022-12-07 | Stop reason: HOSPADM

## 2022-12-06 RX ADMIN — FENTANYL CITRATE 50 MCG: 50 INJECTION, SOLUTION INTRAMUSCULAR; INTRAVENOUS at 16:59

## 2022-12-06 RX ADMIN — ACETAMINOPHEN 1000 MG: 500 TABLET ORAL at 23:19

## 2022-12-06 RX ADMIN — ONDANSETRON 4 MG: 2 INJECTION INTRAMUSCULAR; INTRAVENOUS at 17:17

## 2022-12-06 RX ADMIN — SODIUM CHLORIDE, POTASSIUM CHLORIDE, SODIUM LACTATE AND CALCIUM CHLORIDE: 600; 310; 30; 20 INJECTION, SOLUTION INTRAVENOUS at 16:18

## 2022-12-06 RX ADMIN — FENTANYL CITRATE 50 MCG: 50 INJECTION, SOLUTION INTRAMUSCULAR; INTRAVENOUS at 17:27

## 2022-12-06 RX ADMIN — FENTANYL CITRATE 50 MCG: 50 INJECTION, SOLUTION INTRAMUSCULAR; INTRAVENOUS at 16:44

## 2022-12-06 RX ADMIN — CEFAZOLIN 2 G: 330 INJECTION, POWDER, FOR SOLUTION INTRAMUSCULAR; INTRAVENOUS at 16:32

## 2022-12-06 RX ADMIN — SULFAMETHOXAZOLE AND TRIMETHOPRIM 1 TABLET: 800; 160 TABLET ORAL at 21:17

## 2022-12-06 RX ADMIN — LIDOCAINE HYDROCHLORIDE 20 MG: 20 INJECTION, SOLUTION EPIDURAL; INFILTRATION; INTRACAUDAL at 16:23

## 2022-12-06 RX ADMIN — KETOROLAC TROMETHAMINE 15 MG: 30 INJECTION, SOLUTION INTRAMUSCULAR; INTRAVENOUS at 23:19

## 2022-12-06 RX ADMIN — ROCURONIUM BROMIDE 40 MG: 10 INJECTION, SOLUTION INTRAVENOUS at 16:24

## 2022-12-06 RX ADMIN — MIDAZOLAM HYDROCHLORIDE 1.5 MG: 1 INJECTION, SOLUTION INTRAMUSCULAR; INTRAVENOUS at 16:18

## 2022-12-06 RX ADMIN — PROPOFOL 150 MG: 10 INJECTION, EMULSION INTRAVENOUS at 16:23

## 2022-12-06 RX ADMIN — DOCUSATE SODIUM 100 MG: 100 CAPSULE, LIQUID FILLED ORAL at 21:17

## 2022-12-06 RX ADMIN — FENTANYL CITRATE 50 MCG: 50 INJECTION, SOLUTION INTRAMUSCULAR; INTRAVENOUS at 16:23

## 2022-12-06 RX ADMIN — SODIUM CHLORIDE: 9 INJECTION, SOLUTION INTRAVENOUS at 21:21

## 2022-12-06 ASSESSMENT — PAIN DESCRIPTION - PAIN TYPE
TYPE: ACUTE PAIN
TYPE: SURGICAL PAIN

## 2022-12-06 ASSESSMENT — FIBROSIS 4 INDEX: FIB4 SCORE: 2.48

## 2022-12-06 ASSESSMENT — PAIN SCALES - GENERAL: PAIN_LEVEL: 0

## 2022-12-07 VITALS
TEMPERATURE: 98.5 F | RESPIRATION RATE: 18 BRPM | HEIGHT: 69 IN | OXYGEN SATURATION: 98 % | WEIGHT: 193.56 LBS | DIASTOLIC BLOOD PRESSURE: 55 MMHG | HEART RATE: 77 BPM | BODY MASS INDEX: 28.67 KG/M2 | SYSTOLIC BLOOD PRESSURE: 115 MMHG

## 2022-12-07 LAB — PATHOLOGY CONSULT NOTE: NORMAL

## 2022-12-07 PROCEDURE — 700102 HCHG RX REV CODE 250 W/ 637 OVERRIDE(OP): Performed by: UROLOGY

## 2022-12-07 PROCEDURE — G0378 HOSPITAL OBSERVATION PER HR: HCPCS

## 2022-12-07 PROCEDURE — A9270 NON-COVERED ITEM OR SERVICE: HCPCS | Performed by: UROLOGY

## 2022-12-07 PROCEDURE — 700111 HCHG RX REV CODE 636 W/ 250 OVERRIDE (IP): Performed by: UROLOGY

## 2022-12-07 PROCEDURE — 96376 TX/PRO/DX INJ SAME DRUG ADON: CPT

## 2022-12-07 RX ADMIN — CETIRIZINE HYDROCHLORIDE 10 MG: 10 TABLET, FILM COATED ORAL at 05:19

## 2022-12-07 RX ADMIN — KETOROLAC TROMETHAMINE 15 MG: 30 INJECTION, SOLUTION INTRAMUSCULAR; INTRAVENOUS at 05:19

## 2022-12-07 RX ADMIN — SULFAMETHOXAZOLE AND TRIMETHOPRIM 1 TABLET: 800; 160 TABLET ORAL at 05:19

## 2022-12-07 RX ADMIN — ACETAMINOPHEN 1000 MG: 500 TABLET ORAL at 05:20

## 2022-12-07 RX ADMIN — ATORVASTATIN CALCIUM 40 MG: 40 TABLET, FILM COATED ORAL at 05:20

## 2022-12-07 ASSESSMENT — COGNITIVE AND FUNCTIONAL STATUS - GENERAL
MOVING FROM LYING ON BACK TO SITTING ON SIDE OF FLAT BED: A LITTLE
WALKING IN HOSPITAL ROOM: A LITTLE
TOILETING: A LITTLE
CLIMB 3 TO 5 STEPS WITH RAILING: A LITTLE
TURNING FROM BACK TO SIDE WHILE IN FLAT BAD: A LITTLE
DRESSING REGULAR UPPER BODY CLOTHING: A LITTLE
STANDING UP FROM CHAIR USING ARMS: A LITTLE
MOBILITY SCORE: 18
DRESSING REGULAR LOWER BODY CLOTHING: A LITTLE
SUGGESTED CMS G CODE MODIFIER DAILY ACTIVITY: CK
PERSONAL GROOMING: A LITTLE
MOVING TO AND FROM BED TO CHAIR: A LITTLE
DAILY ACTIVITIY SCORE: 18
SUGGESTED CMS G CODE MODIFIER MOBILITY: CK
EATING MEALS: A LITTLE
HELP NEEDED FOR BATHING: A LITTLE

## 2022-12-07 ASSESSMENT — LIFESTYLE VARIABLES
EVER HAD A DRINK FIRST THING IN THE MORNING TO STEADY YOUR NERVES TO GET RID OF A HANGOVER: NO
TOTAL SCORE: 0
HAVE PEOPLE ANNOYED YOU BY CRITICIZING YOUR DRINKING: NO
EVER FELT BAD OR GUILTY ABOUT YOUR DRINKING: NO
AVERAGE NUMBER OF DAYS PER WEEK YOU HAVE A DRINK CONTAINING ALCOHOL: 0
CONSUMPTION TOTAL: NEGATIVE
TOTAL SCORE: 0
HAVE YOU EVER FELT YOU SHOULD CUT DOWN ON YOUR DRINKING: NO
ON A TYPICAL DAY WHEN YOU DRINK ALCOHOL HOW MANY DRINKS DO YOU HAVE: 0
HOW MANY TIMES IN THE PAST YEAR HAVE YOU HAD 5 OR MORE DRINKS IN A DAY: 0
TOTAL SCORE: 0
ALCOHOL_USE: NO

## 2022-12-07 ASSESSMENT — PAIN DESCRIPTION - PAIN TYPE: TYPE: ACUTE PAIN

## 2022-12-07 ASSESSMENT — PATIENT HEALTH QUESTIONNAIRE - PHQ9
1. LITTLE INTEREST OR PLEASURE IN DOING THINGS: NOT AT ALL
SUM OF ALL RESPONSES TO PHQ9 QUESTIONS 1 AND 2: 0
2. FEELING DOWN, DEPRESSED, IRRITABLE, OR HOPELESS: NOT AT ALL

## 2022-12-07 ASSESSMENT — FIBROSIS 4 INDEX: FIB4 SCORE: 2.48

## 2022-12-07 NOTE — DISCHARGE INSTRUCTIONS
Dr. Higgins' Discharge Instructions FOLLOWING    TRAnsurethral resection of prostate (TURP)     DIET:  You can resume your regular diet. We encourage you to eat well-balanced and nutritious meals.      ACTIVITY:  Please restrain from strenuous activity or heavy lifting (more than 10 pounds) for two weeks following your TUR procedure.  Please walk daily as much as tolerated, making exercise a part of your daily life. Do not drive while using narcotics for pain control if you are using them.  Please avoid excessive straining with defecation and use stool softeners as directed to prevent constipation!     WOUND CARE:  You have no dressing or wound to manage.     CATHETER CARE:  You have a foster catheter in place, and you should care for it as instructed by nurse.  This should be removed on in ~6 days using a syringe to deflate the catheter balloon as instructed by the RN or at a follow up visit in our clinic.     MEDICATIONS:  - Please use plain tylenol or ibuprofen for pain control   - Use stool softeners (Miralax and Colace) as directed to prevent constipation and straining  - If you take aspirin, plavix, coumadin or other blood thinners, please do not take for 6 days following your surgery.  But if still having bloody urine, don't restart these until it has been clear for at least 24 hrs.  - You may use the Oxycodone prescribed if needed for pain refractory to tylenol or Ibuprofen. Please only use if having pain refractory to these medications.  - You will not be on any antibiotics post-operatively but you did receive a dose during your procedure.    FOLLOW-UP:  We will call you to schedule your follow up appointment in ~6 days for your void trial and catheter removal. If you have not heard from us in 1-2 business days, please call [phone] to schedule your follow-up appointment. You may also contact this number if you have questions or concerns that can be answered by Dr. Higgins’ staff.     WARNING  SIGNS:  Fever greater than 101 degrees Fahrenheit, chills, nausea or vomiting, Large amount of clots in urine that make it difficult to urinate or for urine to drain from foster, increasing pain, or abdominal swelling. If you are experiencing these symptoms, call the Urology Clinic or go to your local PCP or emergency room.    It is normal to see blood in your urine for up to 2 weeks even from surgery. The urine may clear up entirely, and then turn bloody again a few days later depending on your activity level; do not be alarmed. However, if you experience severe pain or tenderness, have a lot of increased bleeding, or find that you are unable to urinate because of large clots, please notify your doctor immediately     MEDICAL HELP DURING NORMAL BUSINESS HOURS  Between the hours of 8 AM and 5 PM, please call [phone] to speak with Dr. Phillip Higgins’ staff.     MEDICAL HELP AFTER HOURS  If you have a serious emergency such as chest pain, shortness of breath, relentless pain you should call [phone]. For other urgent problems after hours you may contact the urology physician on call by phoning the [phone]. You may also visit the Emergency room at local hospital for help.    For non-emergent problems such as prescription refills or routine questions, please do your best to contact us during normal business hours. This after-hours number should be used for urgent or emergent questions only.       Phillip Higgins M.D.   [address] [facility]  [place] [address]   [phone]

## 2022-12-07 NOTE — OP REPORT
Genitourinary Operative Report    Pre-operative Diagnosis: BPH with Lower urinary tract symptoms  Nocturnal frequency  Frequency of urination  Weakened urinary stream  Urinary Retention   Post-operative Diagnosis: BPH with Lower urinary tract symptoms  Nocturnal frequency  Frequency of urination  Weakened urinary stream  Urinary Retention  Bladder wall edema vs mass   Procedure: TransUrethral Resection of the Prostate (TURP)     Bladder biopsy and fulguration   Attending: Phillip Higgins M.D., MD   Assistant: None   Anesthesia: Anesthesiologist: Keren Gonzales M.D.   General   Estimated Blood Loss: <100cc   IV fluids: See anesthesia record   Drains: 22F 3 way foster catheter started on CBI   Specimens: Prostatic chips sent for surgical pathology     Complications: None   Condition: Stable, procedure well tolerated    Disposition:  1. PACU, observation overnight with CBI prn.  2. Void trial in 6 days   Findings: Normal appearing bladder, moderate trabeculations, ureteral orifice's clearly visualized with efflux clear urine before and after case, prostate with coapting and obstructive long tall lateral lobes and obstructive bulbous median lobe, with normal bladder neck  2. Also had fairly pronounced catheter edema such that I decided to biopsy one small area to rule out any underlying carcinoma.      Indication:  Uday Osman is a 72 y.o.M with benign prostatic hyperplasia with bladder outlet obstruction refractory to conservative management and urinary retention currently catheter dependent. The risks, benefits, treatment options, potential complications and expected outcomes were discussed with the patient. The patient concurred with the proposed plan, giving informed consent. Risks discussed, but not limited to, were infection, sepsis, bleeding, bladder injury, need for secondary procedure, inability to resect all of prostate, injury to the rectum, injury to ureters, need for foster post op, TUR  syndrome, erectile dysfunction, urinary incontinence, and the cardiovascular and pulmonary complications of anesthesia/surgery including DVT, Mi, PE, and death.    Procedure Details:  The patient was taken to the Operating Room, SCD’s were placed, and a gram of Ancef was administered for marcell-operative antiobiotics.  After induction of general anesthesia the patient was intubated and then placed in the lithotomy position using goyo stirrups. The perineum and genitals were then prepped and draped in the standard sterile fashion.    After calibration of the urethral meatus to 30 Chinese using male sounds, a 26-Chinese rigid resectoscope with visual obturator and 30 degree lens was passed per urethra sterilely. The urethra was normal up to the verumontanum at which point a 4.5 cm prostate with anatomy as noted above. The bladder itself had moderate trabeculation. Bilateral ureteral orifices were visualized showing normal efflux and orientation, but the bladder had pronounced bullous edema of posterior wall and borderline papillary appearance.  I kesha used the resectoscope to take one swipe of this and send it for pathology.     The prostate was then methodically resected using a bipolar loop from the bladder neck to the verumontanum. Attention was then turned toward the left lateral lobes and resected circumferentially from 6 o'clock to 12 o'clock and then the right lobe from 12 o'clock to 6 o'clock in a similar fashion. Care was taken not to perforate the capsule, not to injure the ureters, and not to resect past the verumontanum and damage the sphincter.     At the end of resection, an GiPStech evacuator was used to evacuate all prostate chips. The resectoscope was then passed back in and there were no chips remaining in the bladder or prostatic fossa. Hemostasis was achieved with loop fulguration.  A final check revealed intact ureteral orifices with clear urine from each side and the Verumontanum and sphincter  appeared intact.     A 22 Slovenian 3 way Javed catheter was introduced per urethra sterilely and its balloon was inflated with 40 mL. The catheter was kept stabilized with pink tape on gentle traction, and it was connected expeditiously to continuous bladder irrigation with efflux of clear urine.        Phillip Higgins M.D.   [address] [place] [address]   [phone]

## 2022-12-07 NOTE — CARE PLAN
The patient is Stable - Low risk of patient condition declining or worsening    Shift Goals  Clinical Goals: pt will not sustain a fall this shift.  Patient Goals: sleep comfortably    Progress made toward(s) clinical / shift goals:      Pt placed on low fall precautions. No falls this shift.    Problem: Pain - Standard  Goal: Alleviation of pain or a reduction in pain to the patient’s comfort goal  Outcome: Progressing     Problem: Knowledge Deficit - Standard  Goal: Patient and family/care givers will demonstrate understanding of plan of care, disease process/condition, diagnostic tests and medications  Outcome: Progressing       Patient is not progressing towards the following goals:

## 2022-12-07 NOTE — ANESTHESIA POSTPROCEDURE EVALUATION
Patient: Uday Osman    Procedure Summary     Date: 12/06/22 Room / Location:  OR  / SURGERY [facility]    Anesthesia Start: 1618 Anesthesia Stop: 1748    Procedure: BIPOLAR TRANSURETHRAL RESECTION OF PROSTATE (Groin) Diagnosis: (RETENTION OF URINE)    Surgeons: Phillip Higgins M.D. Responsible Provider: Keren Gonzales M.D.    Anesthesia Type: general ASA Status: 2          Final Anesthesia Type: general  Last vitals  BP   Blood Pressure : 109/56    Temp   36.7 °C (98.1 °F)    Pulse   67   Resp   18    SpO2   98 %      Anesthesia Post Evaluation    Patient location during evaluation: PACU  Patient participation: complete - patient participated  Level of consciousness: awake and alert  Pain score: 0    Airway patency: patent  Anesthetic complications: no  Cardiovascular status: hemodynamically stable  Respiratory status: acceptable  Hydration status: euvolemic    PONV: none          No notable events documented.     Nurse Pain Score: 0 (NPRS)

## 2022-12-07 NOTE — DISCHARGE SUMMARY
Discharge Summary    CHIEF COMPLAINT ON ADMISSION  BPH with retention    Reason for Admission  Post operative care and CBI    Admission Date  12/6/2022    CODE STATUS  Full Code    HPI & HOSPITAL COURSE  This is a 72 y.o. male here with BPH and retention who underwent TURP. He was admitted overnight for CBI and post operative care. He did have some pain initially, but this resolved by POD #1. CBI was maintained and urine cleared. He was given post operative instructions and is aware of catheter management through prior use.     Therefore, he is discharged in good and stable condition to home with close outpatient follow-up.    The patient recovered much more quickly than anticipated on admission.    Discharge Date  12/7/22    FOLLOW UP ITEMS POST DISCHARGE  Javed care  Avoid strenuous activity    DISCHARGE DIAGNOSES  Principal Problem:    Urine retention POA: Yes  Active Problems:    Urinary retention POA: Yes  Resolved Problems:    * No resolved hospital problems. *      FOLLOW UP  No future appointments.  No follow-up provider specified.    MEDICATIONS ON DISCHARGE     Medication List        START taking these medications        Instructions   oxyCODONE immediate-release 5 MG Tabs  Commonly known as: ROXICODONE   Take 1-2 Tablets by mouth every 6 hours as needed for Severe Pain (moderate to severe pain refractory to tylenol or NSAIDs.) for up to 5 days.  Dose: 5-10 mg            CONTINUE taking these medications        Instructions   ascorbic acid 500 MG Tabs  Commonly known as: ascorbic acid   Take 1,500 mg by mouth every day.  Dose: 1,500 mg     aspirin EC 81 MG Tbec  Commonly known as: ECOTRIN   Take 81 mg by mouth every day.  Dose: 81 mg     atorvastatin 40 MG Tabs  Commonly known as: LIPITOR   TAKE 1 TABLET BY MOUTH EVERY DAY  Dose: 40 mg     cetirizine 10 MG Tabs  Commonly known as: ZYRTEC   Take 10 mg by mouth every day.  Dose: 10 mg     coenzyme Q-10 30 MG capsule   Take 60 mg by mouth every day.  Dose: 60  mg     docusate sodium 100 MG Caps  Commonly known as: COLACE   Doctor's comments: Please use to prevent constipation.  If having loose stools, this can be held.  Take 1 Capsule by mouth 2 times a day.  Dose: 100 mg     fish oil 1000 MG Caps capsule   Take 1,200 mg by mouth every day.  Dose: 1,200 mg     GLUCOSAMINE 1500 COMPLEX PO   Take 2 Tabs by mouth every day.  Dose: 2 Tablet     MULTIVITAMIN PO   Take 1 Tab by mouth.  Dose: 1 Tablet     phenazopyridine 100 MG Tabs  Commonly known as: PYRIDIUM   Take 1 Tablet by mouth 3 times a day as needed (bladder pain, dysuria).  Dose: 100 mg     polyethylene glycol/lytes 17 g Pack  Commonly known as: MIRALAX   Take 1 Packet by mouth every day. Please take to prevent constipation following your procedure.  Hold if loose stools  Dose: 17 g     psyllium 58.12 % Pack  Commonly known as: METAMUCIL   Take 1 Packet by mouth 2 times a day.  Dose: 1 Packet     sulfamethoxazole-trimethoprim 400-80 MG Tabs  Commonly known as: BACTRIM   Take 1 Tablet by mouth 2 times a day.  Dose: 1 Tablet     tamsulosin 0.4 MG capsule  Commonly known as: FLOMAX   Take 0.4 mg by mouth 1/2 hour after breakfast.  Dose: 0.4 mg     Vitamin D3 2000 UNIT Caps   Take  by mouth every day.              Allergies  No Known Allergies    DIET  Orders Placed This Encounter   Procedures    Diet Order Diet: Regular     Standing Status:   Standing     Number of Occurrences:   1     Order Specific Question:   Diet:     Answer:   Regular [1]       CONSULTATIONS  None    PROCEDURES  TURP    LABORATORY  Lab Results   Component Value Date    SODIUM 139 11/29/2022    POTASSIUM 3.8 11/29/2022    CHLORIDE 103 11/29/2022    CO2 25 11/29/2022    GLUCOSE 84 11/29/2022    BUN 13 11/29/2022    CREATININE 1.20 11/29/2022        Lab Results   Component Value Date    WBC 7.4 11/29/2022    HEMOGLOBIN 14.6 11/29/2022    HEMATOCRIT 43.9 11/29/2022    PLATELETCT 205 11/29/2022        Total time of the discharge process exceeds 20  minutes.

## 2022-12-07 NOTE — PROGRESS NOTES
Pt arrived on unit. VSS. Pt c/o 2/10 pain in bladder. No n/v. Pt declines pain interventions at this time. CBI in place draining red urine. RN discussed POC with pt for the evening. All questions answered. Fall precautions in place.

## 2022-12-07 NOTE — ANESTHESIA PROCEDURE NOTES
Airway    Date/Time: 12/6/2022 4:27 PM  Performed by: Keren Gonzales M.D.  Authorized by: Keren Gonzales M.D.     Location:  OR  Urgency:  Elective  Indications for Airway Management:  Anesthesia      Spontaneous Ventilation: absent    Sedation Level:  Deep  Preoxygenated: Yes    Patient Position:  Sniffing  Mask Difficulty Assessment:  1 - vent by mask  Final Airway Type:  Endotracheal airway  Final Endotracheal Airway:  ETT  Cuffed: Yes    Technique Used for Successful ETT Placement:  Direct laryngoscopy    Insertion Site:  Oral  Blade Type:  Angelica  Laryngoscope Blade/Videolaryngoscope Blade Size:  4  ETT Size (mm):  7.5  Measured from:  Teeth  ETT to Teeth (cm):  0  Placement Verified by: auscultation and capnometry    Cormack-Lehane Classification:  Grade IIa - partial view of glottis  Number of Attempts at Approach:  1  Number of Other Approaches Attempted:  0

## 2022-12-07 NOTE — PROGRESS NOTES
Received patient from Night shift RN . Patient is awake and alert.No sign of distress.With FC in place, on CBI, draining strawberry color output. Fall precaution in placed, kept bed in lowest position and call light within reach.

## 2022-12-07 NOTE — CARE PLAN
The patient is Stable - Low risk of patient condition declining or worsening    Shift Goals  Clinical Goals: monitor UO  Patient Goals: dc today    Progress made toward(s) clinical / shift goals:  Patients UO is almost clear already,Patient is for DC with FC as ordered.FC care instructed    Patient is not progressing towards the following goals:    Problem: Pain - Standard  Goal: Alleviation of pain or a reduction in pain to the patient’s comfort goal  Outcome: Met

## 2022-12-07 NOTE — ANESTHESIA PREPROCEDURE EVALUATION
Case: [id number] Date/Time: 12/06/22 1615    Procedure: BIPOLAR TRANSURETHRAL RESECTION OF PROSTATE (Groin)    Anesthesia type: General    Pre-op diagnosis: RETENTION OF URINE    Location:  OR  / SURGERY [facility]    Surgeons: Phillip Higgins M.D.          Relevant Problems   No relevant active problems       Physical Exam    Airway   Mallampati: II  TM distance: >3 FB  Neck ROM: full       Cardiovascular - normal exam  Rhythm: regular  Rate: normal  (-) murmur  Comments: EKGNSR   Dental - normal exam           Pulmonary - normal exam  Breath sounds clear to auscultation     Abdominal    Neurological - normal exam                 Anesthesia Plan    ASA 2       Plan - general       Airway plan will be LMA        Plan Factors:   Patient was previously instructed to abstain from smoking on day of procedure.  Patient did not smoke on day of procedure.      Induction: intravenous    Postoperative Plan: Postoperative administration of opioids is intended.    Pertinent diagnostic labs and testing reviewed    Informed Consent:    Anesthetic plan and risks discussed with patient.    Use of blood products discussed with: patient whom consented to blood products.

## 2022-12-07 NOTE — PROGRESS NOTES
Discharge paper work reviewed with patient  at bedside.Copy given.Questions encouraged and aswered. I.V removed. FC care instructed and also leg bag and drainage bag provided.UO is clear as of the moment.    1027-Patient prefers to ambulate going to ED entrance.Patient DC to home

## 2022-12-07 NOTE — ANESTHESIA TIME REPORT
Anesthesia Start and Stop Event Times     Date Time Event    12/6/2022 04:02 PM Ready for Procedure    12/6/2022 04:18 PM Anesthesia Start    12/6/2022 05:48 PM Anesthesia Stop        Responsible Staff  12/06/22    Name Role Begin End    Keren Gonzales M.D. Anesthesiologist 12/06/22 04:18 PM 12/06/22 05:48 PM        Overtime Reason:  no overtime (within assigned shift)    Comments:

## 2022-12-07 NOTE — OR NURSING
1747 : To PACU from OR via bed, report received from anesthesia and RN. Respirations are spontaneous and unlabored. VSS on 6L. OPA present. Javed catheter with CBI present. Urine bright red with clots present. MD at bedside.     1800: OPA removed. Pt denies pain and nausea at this time.     1815: Pt states pain is 3/10 and tolerable.     1830: Pt appears comfortable in bed. CBI remains in place with bright red urine output. Message left for patient's wife.     1845: CBI in place, urine output now red, no clots are present.     1900: Meets criteria to transfer to floor.     1915: Report given to ARY Dos Santos.    1930: No change to pt CBI.     2000: No changes.

## 2022-12-25 ENCOUNTER — HOSPITAL ENCOUNTER (OUTPATIENT)
Facility: MEDICAL CENTER | Age: 72
End: 2022-12-25
Attending: EMERGENCY MEDICINE
Payer: MEDICARE

## 2022-12-25 ENCOUNTER — OFFICE VISIT (OUTPATIENT)
Dept: URGENT CARE | Facility: CLINIC | Age: 72
End: 2022-12-25
Payer: MEDICARE

## 2022-12-25 VITALS
DIASTOLIC BLOOD PRESSURE: 70 MMHG | OXYGEN SATURATION: 99 % | WEIGHT: 200.3 LBS | HEIGHT: 69 IN | TEMPERATURE: 98.2 F | HEART RATE: 89 BPM | BODY MASS INDEX: 29.67 KG/M2 | RESPIRATION RATE: 12 BRPM | SYSTOLIC BLOOD PRESSURE: 120 MMHG

## 2022-12-25 DIAGNOSIS — Z90.79 S/P TURP (STATUS POST TRANSURETHRAL RESECTION OF PROSTATE): ICD-10-CM

## 2022-12-25 DIAGNOSIS — R30.0 DYSURIA: ICD-10-CM

## 2022-12-25 DIAGNOSIS — N30.01 ACUTE CYSTITIS WITH HEMATURIA: Primary | ICD-10-CM

## 2022-12-25 LAB
APPEARANCE UR: NORMAL
BILIRUB UR STRIP-MCNC: NEGATIVE MG/DL
COLOR UR AUTO: YELLOW
GLUCOSE UR STRIP.AUTO-MCNC: NEGATIVE MG/DL
KETONES UR STRIP.AUTO-MCNC: NEGATIVE MG/DL
LEUKOCYTE ESTERASE UR QL STRIP.AUTO: NORMAL
NITRITE UR QL STRIP.AUTO: POSITIVE
PH UR STRIP.AUTO: 6 [PH] (ref 5–8)
PROT UR QL STRIP: 100 MG/DL
RBC UR QL AUTO: NORMAL
SP GR UR STRIP.AUTO: 1.02
UROBILINOGEN UR STRIP-MCNC: 0.2 MG/DL

## 2022-12-25 PROCEDURE — 87086 URINE CULTURE/COLONY COUNT: CPT

## 2022-12-25 PROCEDURE — 99214 OFFICE O/P EST MOD 30 MIN: CPT | Performed by: EMERGENCY MEDICINE

## 2022-12-25 PROCEDURE — 87186 SC STD MICRODIL/AGAR DIL: CPT

## 2022-12-25 PROCEDURE — 81002 URINALYSIS NONAUTO W/O SCOPE: CPT | Performed by: EMERGENCY MEDICINE

## 2022-12-25 PROCEDURE — 87077 CULTURE AEROBIC IDENTIFY: CPT

## 2022-12-25 RX ORDER — CIPROFLOXACIN 500 MG/1
500 TABLET, FILM COATED ORAL 2 TIMES DAILY
Qty: 14 TABLET | Refills: 0 | Status: SHIPPED | OUTPATIENT
Start: 2022-12-25 | End: 2023-01-01

## 2022-12-25 ASSESSMENT — ENCOUNTER SYMPTOMS
FEVER: 0
FLANK PAIN: 0
NAUSEA: 0
VOMITING: 0
ABDOMINAL PAIN: 0
CHILLS: 0

## 2022-12-25 ASSESSMENT — FIBROSIS 4 INDEX: FIB4 SCORE: 2.48

## 2022-12-25 NOTE — PROGRESS NOTES
"  Subjective:     Uday Osman  is a 72 y.o. male who presents for Urinary Frequency (X 3 weeks burning-- since surgery)       Patient is a 72-year-old gentleman who presents complaining that he feels like he possibly has a UTI.  The patient has a complex recent past medical history, which was confirmed and augmented by review of the old records.  The patient had urinary retention, had a longstanding Javed for approximately 10 weeks, he then had a TURP done on December 6, his catheter was removed on December 12.  He was placed on Bactrim after the TURP.  His old labs on 1129 showed a normal EGFR as well as a normal BUN and creatinine of 13 and 1.2.  Patient states his symptoms have really been going on since the catheter was removed on December 12, waxing and waning.  He has dysuria, frequency, urgency.  Last night he says he was up hourly urinating.  He had a low-grade temp of 100.5, but no nausea, no vomiting, no flank pain, no abdominal pain.  No Medication allergies.  Patient brings his last 2 antibiotic prescriptions, he was on Cipro on 11/6, and Bactrim most recently as noted above.   Review of Systems   Constitutional:  Negative for chills and fever.   Gastrointestinal:  Negative for abdominal pain, nausea and vomiting.   Genitourinary:  Positive for dysuria, frequency and urgency. Negative for flank pain and hematuria.   All other systems reviewed and are negative. No Known Allergies  Past Medical History:   Diagnosis Date    High cholesterol     Snoring     Urinary bladder disorder March 2020    Urinary Retention        Objective:   /70 (BP Location: Left arm, Patient Position: Sitting, BP Cuff Size: Adult long)   Pulse 89   Temp 36.8 °C (98.2 °F) (Temporal)   Resp 12   Ht 1.74 m (5' 8.5\")   Wt 90.9 kg (200 lb 4.8 oz)   SpO2 99%   BMI 30.01 kg/m²   Physical Exam  Vitals and nursing note reviewed.   Constitutional:       Appearance: Normal appearance. He is not ill-appearing, " toxic-appearing or diaphoretic.   HENT:      Head: Normocephalic and atraumatic.      Nose: No rhinorrhea.      Mouth/Throat:      Mouth: Mucous membranes are moist.      Pharynx: No oropharyngeal exudate or posterior oropharyngeal erythema.   Eyes:      General: No scleral icterus.  Cardiovascular:      Rate and Rhythm: Normal rate and regular rhythm.      Heart sounds: Normal heart sounds. No murmur heard.  Pulmonary:      Effort: Pulmonary effort is normal. No respiratory distress.      Breath sounds: Normal breath sounds. No wheezing or rhonchi.   Abdominal:      Palpations: Abdomen is soft.      Tenderness: There is no abdominal tenderness. There is no right CVA tenderness, left CVA tenderness, guarding or rebound.   Musculoskeletal:      Cervical back: Neck supple. No tenderness.      Right lower leg: No edema.      Left lower leg: No edema.   Lymphadenopathy:      Cervical: No cervical adenopathy.   Skin:     General: Skin is warm.      Capillary Refill: Capillary refill takes less than 2 seconds.      Coloration: Skin is not jaundiced.      Findings: No rash.   Neurological:      General: No focal deficit present.      Mental Status: He is alert and oriented to person, place, and time.      Gait: Gait normal.   Psychiatric:         Mood and Affect: Mood normal.         Behavior: Behavior normal.         Thought Content: Thought content normal.         Assessment/Plan:     Encounter Diagnoses   Name Primary?    Acute cystitis with hematuria Yes    S/P TURP (status post transurethral resection of prostate)     Dysuria        Patient with urinary symptoms status post recent TURP, will evaluate him for UTI.  Patient is high risk due to age and recent instrumentation as well as recent antibiotics.  However he appears nontoxic, not systemically ill in any way.    Assessment    1. Acute cystitis with hematuria  - ciprofloxacin (CIPRO) 500 MG Tab; Take 1 Tablet by mouth 2 times a day for 7 days.  Dispense: 14  Tablet; Refill: 0    2. S/P TURP (status post transurethral resection of prostate)    3. Dysuria  - POCT Urinalysis  - URINE CULTURE(NEW); Future    POCT UA s.g. 1.025, bld mod, pro 100, nit pos, jose armando mod, ket neg    Patient's problem is acute, it is complicated by his recent surgery and underlying BPH as well as prior antibiotic treatment.  No recent urine culture was found for reference on antibiotic management.  Patient will be treated with Cipro as he has a complicated UTI, patient is aware that Cipro is not ideal for him as a geriatric patient, but he was just on Bactrim, so that is not a good choice at this time.  Patient understands the culture results will return in 48 to 72 hours to help guide therapy if he is not improving.  Patient will reach out to his urologist for further guidance on the culture results.  The culture order was placed, with guidance that it should be immediately released to the patient.  Copy  of this chart will also be provided electronically to patient's primary care as well as his urologist Dr. Higgins.    See AVS Instructions below for written guidance provided to patient on after-visit management and care in addition to our verbal discussion during the visit.    Please note that this dictation was created using voice recognition software. I have attempted to correct all errors, but there may be sound-alike, spelling, grammar and possibly content errors that I did not discover before finalizing the note.

## 2022-12-25 NOTE — Clinical Note
Pt with UTI s/p TURP on 12/6 with Bactrim postop.  Placed on Cipro, culture sent.  Advised pt to f/u urology for further guidance.  Thank you.

## 2022-12-25 NOTE — PATIENT INSTRUCTIONS
Please take and complete the antibiotics you have been prescribed.    Urine culture has been sent, the final results should be back Tuesday or Wednesday of this week, I have requested that the results be released to you immediately.  If you are not improving on the antibiotics in 48 hours, please reach out to your primary care physician and/or your urologist for guidance.    Please reach out to your urologist on Monday to let them know that you have a UTI and are being treated with antibiotics, so perhaps they can follow-up on the urine culture as well.

## 2022-12-26 DIAGNOSIS — R30.0 DYSURIA: ICD-10-CM

## 2022-12-28 LAB
BACTERIA UR CULT: ABNORMAL
BACTERIA UR CULT: ABNORMAL
SIGNIFICANT IND 70042: ABNORMAL
SITE SITE: ABNORMAL
SOURCE SOURCE: ABNORMAL

## 2023-01-06 ENCOUNTER — OFFICE VISIT (OUTPATIENT)
Dept: MEDICAL GROUP | Facility: MEDICAL CENTER | Age: 73
End: 2023-01-06
Payer: MEDICARE

## 2023-01-06 ENCOUNTER — HOSPITAL ENCOUNTER (INPATIENT)
Facility: MEDICAL CENTER | Age: 73
LOS: 1 days | DRG: 690 | End: 2023-01-08
Attending: EMERGENCY MEDICINE | Admitting: STUDENT IN AN ORGANIZED HEALTH CARE EDUCATION/TRAINING PROGRAM
Payer: MEDICARE

## 2023-01-06 VITALS
TEMPERATURE: 98.7 F | BODY MASS INDEX: 30.48 KG/M2 | OXYGEN SATURATION: 97 % | WEIGHT: 205.8 LBS | HEART RATE: 88 BPM | RESPIRATION RATE: 20 BRPM | HEIGHT: 69 IN | SYSTOLIC BLOOD PRESSURE: 118 MMHG | DIASTOLIC BLOOD PRESSURE: 68 MMHG

## 2023-01-06 DIAGNOSIS — Z16.12 UTI DUE TO EXTENDED-SPECTRUM BETA LACTAMASE (ESBL) PRODUCING ESCHERICHIA COLI: ICD-10-CM

## 2023-01-06 DIAGNOSIS — N39.0 ACUTE UTI: ICD-10-CM

## 2023-01-06 DIAGNOSIS — N39.0 UTI DUE TO EXTENDED-SPECTRUM BETA LACTAMASE (ESBL) PRODUCING ESCHERICHIA COLI: ICD-10-CM

## 2023-01-06 DIAGNOSIS — B96.29 UTI DUE TO EXTENDED-SPECTRUM BETA LACTAMASE (ESBL) PRODUCING ESCHERICHIA COLI: ICD-10-CM

## 2023-01-06 DIAGNOSIS — N41.9 PROSTATITIS, UNSPECIFIED PROSTATITIS TYPE: ICD-10-CM

## 2023-01-06 DIAGNOSIS — R50.9 FEVER AND CHILLS: ICD-10-CM

## 2023-01-06 LAB
ALBUMIN SERPL BCP-MCNC: 3.7 G/DL (ref 3.2–4.9)
ALBUMIN/GLOB SERPL: 1.1 G/DL
ALP SERPL-CCNC: 198 U/L (ref 30–99)
ALT SERPL-CCNC: 23 U/L (ref 2–50)
ANION GAP SERPL CALC-SCNC: 8 MMOL/L (ref 7–16)
APPEARANCE UR: ABNORMAL
AST SERPL-CCNC: 25 U/L (ref 12–45)
BACTERIA #/AREA URNS HPF: ABNORMAL /HPF
BASOPHILS # BLD AUTO: 0.8 % (ref 0–1.8)
BASOPHILS # BLD: 0.07 K/UL (ref 0–0.12)
BILIRUB SERPL-MCNC: 0.4 MG/DL (ref 0.1–1.5)
BILIRUB UR QL STRIP.AUTO: NEGATIVE
BUN SERPL-MCNC: 12 MG/DL (ref 8–22)
CALCIUM ALBUM COR SERPL-MCNC: 9.2 MG/DL (ref 8.5–10.5)
CALCIUM SERPL-MCNC: 9 MG/DL (ref 8.5–10.5)
CHLORIDE SERPL-SCNC: 106 MMOL/L (ref 96–112)
CO2 SERPL-SCNC: 25 MMOL/L (ref 20–33)
COLOR UR: YELLOW
CREAT SERPL-MCNC: 1.21 MG/DL (ref 0.5–1.4)
EOSINOPHIL # BLD AUTO: 0.15 K/UL (ref 0–0.51)
EOSINOPHIL NFR BLD: 1.7 % (ref 0–6.9)
EPI CELLS #/AREA URNS HPF: ABNORMAL /HPF
ERYTHROCYTE [DISTWIDTH] IN BLOOD BY AUTOMATED COUNT: 44.6 FL (ref 35.9–50)
GFR SERPLBLD CREATININE-BSD FMLA CKD-EPI: 64 ML/MIN/1.73 M 2
GLOBULIN SER CALC-MCNC: 3.5 G/DL (ref 1.9–3.5)
GLUCOSE SERPL-MCNC: 92 MG/DL (ref 65–99)
GLUCOSE UR STRIP.AUTO-MCNC: NEGATIVE MG/DL
HCT VFR BLD AUTO: 39 % (ref 42–52)
HGB BLD-MCNC: 13 G/DL (ref 14–18)
HYALINE CASTS #/AREA URNS LPF: ABNORMAL /LPF
IMM GRANULOCYTES # BLD AUTO: 0.08 K/UL (ref 0–0.11)
IMM GRANULOCYTES NFR BLD AUTO: 0.9 % (ref 0–0.9)
KETONES UR STRIP.AUTO-MCNC: NEGATIVE MG/DL
LACTATE SERPL-SCNC: 1.1 MMOL/L (ref 0.5–2)
LEUKOCYTE ESTERASE UR QL STRIP.AUTO: ABNORMAL
LYMPHOCYTES # BLD AUTO: 1.39 K/UL (ref 1–4.8)
LYMPHOCYTES NFR BLD: 15.3 % (ref 22–41)
MCH RBC QN AUTO: 30.7 PG (ref 27–33)
MCHC RBC AUTO-ENTMCNC: 33.3 G/DL (ref 33.7–35.3)
MCV RBC AUTO: 92.2 FL (ref 81.4–97.8)
MICRO URNS: ABNORMAL
MONOCYTES # BLD AUTO: 0.95 K/UL (ref 0–0.85)
MONOCYTES NFR BLD AUTO: 10.5 % (ref 0–13.4)
NEUTROPHILS # BLD AUTO: 6.45 K/UL (ref 1.82–7.42)
NEUTROPHILS NFR BLD: 70.8 % (ref 44–72)
NITRITE UR QL STRIP.AUTO: POSITIVE
NRBC # BLD AUTO: 0 K/UL
NRBC BLD-RTO: 0 /100 WBC
PH UR STRIP.AUTO: 6 [PH] (ref 5–8)
PLATELET # BLD AUTO: 314 K/UL (ref 164–446)
PMV BLD AUTO: 8.8 FL (ref 9–12.9)
POTASSIUM SERPL-SCNC: 3.8 MMOL/L (ref 3.6–5.5)
PROT SERPL-MCNC: 7.2 G/DL (ref 6–8.2)
PROT UR QL STRIP: 100 MG/DL
RBC # BLD AUTO: 4.23 M/UL (ref 4.7–6.1)
RBC # URNS HPF: ABNORMAL /HPF
RBC UR QL AUTO: ABNORMAL
SODIUM SERPL-SCNC: 139 MMOL/L (ref 135–145)
SP GR UR STRIP.AUTO: 1.01
UROBILINOGEN UR STRIP.AUTO-MCNC: 0.2 MG/DL
WBC # BLD AUTO: 9.1 K/UL (ref 4.8–10.8)
WBC #/AREA URNS HPF: ABNORMAL /HPF

## 2023-01-06 PROCEDURE — 96372 THER/PROPH/DIAG INJ SC/IM: CPT

## 2023-01-06 PROCEDURE — 87040 BLOOD CULTURE FOR BACTERIA: CPT

## 2023-01-06 PROCEDURE — 81001 URINALYSIS AUTO W/SCOPE: CPT

## 2023-01-06 PROCEDURE — 700101 HCHG RX REV CODE 250: Performed by: EMERGENCY MEDICINE

## 2023-01-06 PROCEDURE — 87077 CULTURE AEROBIC IDENTIFY: CPT

## 2023-01-06 PROCEDURE — 99285 EMERGENCY DEPT VISIT HI MDM: CPT

## 2023-01-06 PROCEDURE — 87186 SC STD MICRODIL/AGAR DIL: CPT

## 2023-01-06 PROCEDURE — 99214 OFFICE O/P EST MOD 30 MIN: CPT | Performed by: FAMILY MEDICINE

## 2023-01-06 PROCEDURE — 87086 URINE CULTURE/COLONY COUNT: CPT

## 2023-01-06 PROCEDURE — 36415 COLL VENOUS BLD VENIPUNCTURE: CPT

## 2023-01-06 PROCEDURE — 700111 HCHG RX REV CODE 636 W/ 250 OVERRIDE (IP): Performed by: STUDENT IN AN ORGANIZED HEALTH CARE EDUCATION/TRAINING PROGRAM

## 2023-01-06 PROCEDURE — 700111 HCHG RX REV CODE 636 W/ 250 OVERRIDE (IP): Performed by: EMERGENCY MEDICINE

## 2023-01-06 PROCEDURE — G0378 HOSPITAL OBSERVATION PER HR: HCPCS

## 2023-01-06 PROCEDURE — 51702 INSERT TEMP BLADDER CATH: CPT

## 2023-01-06 PROCEDURE — 85025 COMPLETE CBC W/AUTO DIFF WBC: CPT

## 2023-01-06 PROCEDURE — 99222 1ST HOSP IP/OBS MODERATE 55: CPT | Performed by: STUDENT IN AN ORGANIZED HEALTH CARE EDUCATION/TRAINING PROGRAM

## 2023-01-06 PROCEDURE — 83605 ASSAY OF LACTIC ACID: CPT

## 2023-01-06 PROCEDURE — 700105 HCHG RX REV CODE 258: Performed by: EMERGENCY MEDICINE

## 2023-01-06 PROCEDURE — 303105 HCHG CATHETER EXTRA

## 2023-01-06 PROCEDURE — 80053 COMPREHEN METABOLIC PANEL: CPT

## 2023-01-06 PROCEDURE — 96365 THER/PROPH/DIAG IV INF INIT: CPT

## 2023-01-06 PROCEDURE — A9270 NON-COVERED ITEM OR SERVICE: HCPCS | Performed by: EMERGENCY MEDICINE

## 2023-01-06 PROCEDURE — 700102 HCHG RX REV CODE 250 W/ 637 OVERRIDE(OP): Performed by: EMERGENCY MEDICINE

## 2023-01-06 RX ORDER — TAMSULOSIN HYDROCHLORIDE 0.4 MG/1
CAPSULE ORAL
COMMUNITY
End: 2023-01-06

## 2023-01-06 RX ORDER — POLYETHYLENE GLYCOL 3350 17 G/17G
1 POWDER, FOR SOLUTION ORAL
Status: DISCONTINUED | OUTPATIENT
Start: 2023-01-06 | End: 2023-01-08 | Stop reason: HOSPADM

## 2023-01-06 RX ORDER — ENOXAPARIN SODIUM 100 MG/ML
40 INJECTION SUBCUTANEOUS DAILY
Status: DISCONTINUED | OUTPATIENT
Start: 2023-01-06 | End: 2023-01-08 | Stop reason: HOSPADM

## 2023-01-06 RX ORDER — ACETAMINOPHEN 325 MG/1
650 TABLET ORAL ONCE
Status: COMPLETED | OUTPATIENT
Start: 2023-01-06 | End: 2023-01-06

## 2023-01-06 RX ORDER — SCOLOPAMINE TRANSDERMAL SYSTEM 1 MG/1
PATCH, EXTENDED RELEASE TRANSDERMAL
COMMUNITY
Start: 2022-12-23 | End: 2023-01-06

## 2023-01-06 RX ORDER — CIPROFLOXACIN 500 MG/1
TABLET, FILM COATED ORAL
COMMUNITY
End: 2023-01-06

## 2023-01-06 RX ORDER — CHLORAL HYDRATE 500 MG
1000 CAPSULE ORAL DAILY
COMMUNITY

## 2023-01-06 RX ORDER — SCOLOPAMINE TRANSDERMAL SYSTEM 1 MG/1
PATCH, EXTENDED RELEASE TRANSDERMAL
COMMUNITY
End: 2023-01-06

## 2023-01-06 RX ORDER — SULFAMETHOXAZOLE AND TRIMETHOPRIM 800; 160 MG/1; MG/1
TABLET ORAL
COMMUNITY
Start: 2022-12-03 | End: 2023-01-06

## 2023-01-06 RX ORDER — ACETAMINOPHEN 325 MG/1
650 TABLET ORAL EVERY 6 HOURS PRN
Status: DISCONTINUED | OUTPATIENT
Start: 2023-01-06 | End: 2023-01-08 | Stop reason: HOSPADM

## 2023-01-06 RX ORDER — AMOXICILLIN 250 MG
2 CAPSULE ORAL 2 TIMES DAILY
Status: DISCONTINUED | OUTPATIENT
Start: 2023-01-06 | End: 2023-01-08 | Stop reason: HOSPADM

## 2023-01-06 RX ORDER — CIPROFLOXACIN 500 MG/1
500 TABLET, FILM COATED ORAL 2 TIMES DAILY
Status: ON HOLD | COMMUNITY
End: 2023-01-07

## 2023-01-06 RX ORDER — BISACODYL 10 MG
10 SUPPOSITORY, RECTAL RECTAL
Status: DISCONTINUED | OUTPATIENT
Start: 2023-01-06 | End: 2023-01-08 | Stop reason: HOSPADM

## 2023-01-06 RX ORDER — SULFAMETHOXAZOLE AND TRIMETHOPRIM 800; 160 MG/1; MG/1
TABLET ORAL
COMMUNITY
End: 2023-01-06

## 2023-01-06 RX ORDER — DOXYCYCLINE 100 MG/1
CAPSULE ORAL
COMMUNITY
Start: 2022-12-28 | End: 2023-01-06

## 2023-01-06 RX ORDER — CEFTRIAXONE 2 G/1
2000 INJECTION, POWDER, FOR SOLUTION INTRAMUSCULAR; INTRAVENOUS ONCE
Status: DISCONTINUED | OUTPATIENT
Start: 2023-01-06 | End: 2023-01-06

## 2023-01-06 RX ORDER — ONDANSETRON 2 MG/ML
4 INJECTION INTRAMUSCULAR; INTRAVENOUS EVERY 4 HOURS PRN
Status: DISCONTINUED | OUTPATIENT
Start: 2023-01-06 | End: 2023-01-08 | Stop reason: HOSPADM

## 2023-01-06 RX ORDER — ONDANSETRON 4 MG/1
4 TABLET, ORALLY DISINTEGRATING ORAL EVERY 4 HOURS PRN
Status: DISCONTINUED | OUTPATIENT
Start: 2023-01-06 | End: 2023-01-08 | Stop reason: HOSPADM

## 2023-01-06 RX ORDER — OXYCODONE HYDROCHLORIDE 5 MG/1
TABLET ORAL
COMMUNITY
End: 2023-01-06

## 2023-01-06 RX ORDER — TAMSULOSIN HYDROCHLORIDE 0.4 MG/1
0.4 CAPSULE ORAL
Status: SHIPPED | COMMUNITY
End: 2023-01-17

## 2023-01-06 RX ORDER — SULFAMETHOXAZOLE AND TRIMETHOPRIM 800; 160 MG/1; MG/1
1 TABLET ORAL 2 TIMES DAILY
Status: ON HOLD | COMMUNITY
End: 2023-01-07

## 2023-01-06 RX ORDER — LABETALOL HYDROCHLORIDE 5 MG/ML
10 INJECTION, SOLUTION INTRAVENOUS EVERY 4 HOURS PRN
Status: DISCONTINUED | OUTPATIENT
Start: 2023-01-06 | End: 2023-01-08 | Stop reason: HOSPADM

## 2023-01-06 RX ORDER — LIDOCAINE HYDROCHLORIDE 20 MG/ML
JELLY TOPICAL ONCE
Status: COMPLETED | OUTPATIENT
Start: 2023-01-06 | End: 2023-01-06

## 2023-01-06 RX ORDER — DOXYCYCLINE 100 MG/1
100 CAPSULE ORAL 2 TIMES DAILY
Status: ON HOLD | COMMUNITY
End: 2023-01-07

## 2023-01-06 RX ADMIN — MEROPENEM 500 MG: 500 INJECTION, POWDER, FOR SOLUTION INTRAVENOUS at 19:51

## 2023-01-06 RX ADMIN — LIDOCAINE HYDROCHLORIDE 6 ML: 20 JELLY TOPICAL at 19:00

## 2023-01-06 RX ADMIN — ACETAMINOPHEN 650 MG: 325 TABLET ORAL at 20:32

## 2023-01-06 RX ADMIN — ENOXAPARIN SODIUM 40 MG: 40 INJECTION SUBCUTANEOUS at 21:34

## 2023-01-06 ASSESSMENT — ENCOUNTER SYMPTOMS
SHORTNESS OF BREATH: 0
CHILLS: 0
NAUSEA: 0
PALPITATIONS: 0
ABDOMINAL PAIN: 0
VOMITING: 0
FEVER: 0

## 2023-01-06 ASSESSMENT — FIBROSIS 4 INDEX
FIB4 SCORE: 1.18
FIB4 SCORE: 2.48
FIB4 SCORE: 2.48

## 2023-01-06 ASSESSMENT — PATIENT HEALTH QUESTIONNAIRE - PHQ9: CLINICAL INTERPRETATION OF PHQ2 SCORE: 0

## 2023-01-06 NOTE — NON-PROVIDER
Called [facility] [place] 1/6/23 per Dr Najera request to try and get a urologist on the phone was told to call back on Monday that all the urologist are out of the office.

## 2023-01-06 NOTE — PROGRESS NOTES
This medical record contains text that has been entered with the assistance of computer voice recognition and dictation software.  Therefore, it may contain unintended errors in text, spelling, punctuation, or grammar        Chief Complaint   Patient presents with    Dysuria     Taking Abx x 10 days, still having chills, fever & body aches,                 Uday Osman is a 72 y.o. male here evaluation and management of:         Current Outpatient Medications   Medication Sig Dispense Refill    doxycycline (MONODOX) 100 MG capsule doxycycline monohydrate 100 mg capsule   TAKE 1 CAPSULE BY MOUTH TWICE A DAY FOR 10 DAYS      docusate sodium (COLACE) 100 MG Cap Take 1 Capsule by mouth 2 times a day. 30 Capsule 0    atorvastatin (LIPITOR) 40 MG Tab TAKE 1 TABLET BY MOUTH EVERY  Tablet 3    psyllium (METAMUCIL) 58.12 % Pack Take 1 Packet by mouth 2 times a day.      cetirizine (ZYRTEC) 10 MG Tab Take 10 mg by mouth every day.      ascorbic acid (ASCORBIC ACID) 500 MG TABS Take 1,500 mg by mouth every day.       No current facility-administered medications for this visit.     Patient Active Problem List    Diagnosis Date Noted    Prostatitis 01/06/2023    Fever and chills 01/06/2023    Urinary retention 12/06/2022    Encounter for weight loss counseling 08/30/2022    Urine retention 08/30/2022    Elevated liver enzymes 06/16/2022    Fungus infection 12/23/2021    Left anterior knee pain 07/28/2021    Effects of high altitude 06/17/2021    Elevated blood pressure reading 06/17/2021    Medicare annual wellness visit, subsequent 06/17/2021    History of fever 06/25/2020    Mixed hyperlipidemia 03/21/2018    Annual physical exam 03/21/2018    Diverticulosis of large intestine without hemorrhage 03/21/2018    Ventral hernia 04/22/2015     Past Surgical History:   Procedure Laterality Date    ME TRANSURETHRAL ELEC-SURG PROSTATECTOM  12/6/2022    Procedure: BIPOLAR TRANSURETHRAL RESECTION OF PROSTATE;  Surgeon:  "Phillip Higgins M.D.;  Location: SURGERY [facility];  Service: Urology    VENTRAL HERNIA REPAIR  2015    Performed by Sánchez Mayorga M.D. at SURGERY [facility]    COLONOSCOPY  01/01/2013    X 2    OTHER ORTHOPEDIC SURGERY      fx finger/ age 15      Social History     Tobacco Use    Smoking status: Former     Packs/day: 1.00     Years: 25.00     Pack years: 25.00     Types: Cigarettes     Start date: 10/1/1964     Quit date: 1990     Years since quittin.0    Smokeless tobacco: Never   Vaping Use    Vaping Use: Never used   Substance Use Topics    Alcohol use: Yes     Comment: very occasional    Drug use: Yes     Types: Marijuana     Comment: occasional Cannabis edible rarely     Family History   Problem Relation Age of Onset    Glaucoma Mother     Diabetes Father     Heart Disease Father     Hypertension Father     Hyperlipidemia Father     Stroke Father            ROS    all review of system completed and negative except for those listed above     Objective:     /68 (BP Location: Left arm, Patient Position: Sitting, BP Cuff Size: Large adult)   Pulse 88   Temp 37.1 °C (98.7 °F) (Temporal)   Resp 20   Ht 1.753 m (5' 9\")   Wt 93.3 kg (205 lb 12.8 oz)   SpO2 97%  Body mass index is 30.39 kg/m².  Physical Exam:        GEN: comfortable, alert and oriented, well nourished, well developed, in no apparent distress   HEENT: NCAT, eyes: pupils equal and reactive, sclera white, EOMIT, good dentition  HEART: limbs warm and well perfused, regular rate, no JVD, no lower extremity edema  LUNGS: speaking in full sentences, not in apparent respiratory distress, no audible wheezes  MSK: normal tone and bulk, no swelling of the joints, gait steady and normal           Assessment and Plan:   The following treatment plan was discussed        Problem List Items Addressed This Visit       Prostatitis     Patient underwent turp early November   Later that month developed fever chill painful urination "   Urine cultures show ESBL E coli   Patient was told to switch to doxycyline   Unfortunately he is on the last day of this and he continues to have frequent urination dysuria and fever chills   Some urinary retention     Instructed patient to go to [facility] [place] now, their office will triage him, if he is unable to get a plan with their office he will need to go to ER     Called and spoke with on call urologist who agrees with this plan     30+ min spent              Fever and chills             Instructed to follow up if symptoms worsen or fail to improve, ER/UC precautions discussed as well    Jesi Najera MD  [facility]   [address] [place] [address] [address]   [place] [id number]  Phone: [phone]

## 2023-01-06 NOTE — ASSESSMENT & PLAN NOTE
Patient underwent turp early November   Later that month developed fever chill painful urination   Urine cultures show ESBL E coli   Patient was told to switch to doxycyline   Unfortunately he is on the last day of this and he continues to have frequent urination dysuria and fever chills   Some urinary retention     Instructed patient to go to [facility] [place] now, their office will triage him, if he is unable to get a plan with their office he will need to go to ER     Called and spoke with on call urologist who agrees with this plan     30+ min spent

## 2023-01-07 LAB
ANION GAP SERPL CALC-SCNC: 7 MMOL/L (ref 7–16)
BUN SERPL-MCNC: 12 MG/DL (ref 8–22)
CALCIUM SERPL-MCNC: 8.8 MG/DL (ref 8.5–10.5)
CHLORIDE SERPL-SCNC: 106 MMOL/L (ref 96–112)
CO2 SERPL-SCNC: 26 MMOL/L (ref 20–33)
CREAT SERPL-MCNC: 1.09 MG/DL (ref 0.5–1.4)
ERYTHROCYTE [DISTWIDTH] IN BLOOD BY AUTOMATED COUNT: 45.1 FL (ref 35.9–50)
GFR SERPLBLD CREATININE-BSD FMLA CKD-EPI: 72 ML/MIN/1.73 M 2
GLUCOSE SERPL-MCNC: 134 MG/DL (ref 65–99)
HCT VFR BLD AUTO: 35.1 % (ref 42–52)
HGB BLD-MCNC: 11.7 G/DL (ref 14–18)
MCH RBC QN AUTO: 31 PG (ref 27–33)
MCHC RBC AUTO-ENTMCNC: 33.3 G/DL (ref 33.7–35.3)
MCV RBC AUTO: 92.9 FL (ref 81.4–97.8)
PLATELET # BLD AUTO: 291 K/UL (ref 164–446)
PMV BLD AUTO: 9.1 FL (ref 9–12.9)
POTASSIUM SERPL-SCNC: 3.9 MMOL/L (ref 3.6–5.5)
RBC # BLD AUTO: 3.78 M/UL (ref 4.7–6.1)
SODIUM SERPL-SCNC: 139 MMOL/L (ref 135–145)
WBC # BLD AUTO: 9.2 K/UL (ref 4.8–10.8)

## 2023-01-07 PROCEDURE — 700105 HCHG RX REV CODE 258: Performed by: STUDENT IN AN ORGANIZED HEALTH CARE EDUCATION/TRAINING PROGRAM

## 2023-01-07 PROCEDURE — 700105 HCHG RX REV CODE 258: Performed by: INTERNAL MEDICINE

## 2023-01-07 PROCEDURE — 99232 SBSQ HOSP IP/OBS MODERATE 35: CPT | Performed by: STUDENT IN AN ORGANIZED HEALTH CARE EDUCATION/TRAINING PROGRAM

## 2023-01-07 PROCEDURE — 99223 1ST HOSP IP/OBS HIGH 75: CPT | Performed by: INTERNAL MEDICINE

## 2023-01-07 PROCEDURE — 700102 HCHG RX REV CODE 250 W/ 637 OVERRIDE(OP): Performed by: STUDENT IN AN ORGANIZED HEALTH CARE EDUCATION/TRAINING PROGRAM

## 2023-01-07 PROCEDURE — 700111 HCHG RX REV CODE 636 W/ 250 OVERRIDE (IP): Performed by: STUDENT IN AN ORGANIZED HEALTH CARE EDUCATION/TRAINING PROGRAM

## 2023-01-07 PROCEDURE — 700111 HCHG RX REV CODE 636 W/ 250 OVERRIDE (IP): Performed by: INTERNAL MEDICINE

## 2023-01-07 PROCEDURE — A9270 NON-COVERED ITEM OR SERVICE: HCPCS | Performed by: STUDENT IN AN ORGANIZED HEALTH CARE EDUCATION/TRAINING PROGRAM

## 2023-01-07 PROCEDURE — 770001 HCHG ROOM/CARE - MED/SURG/GYN PRIV*

## 2023-01-07 PROCEDURE — 306015 LOCK STAT FOLEY: Performed by: STUDENT IN AN ORGANIZED HEALTH CARE EDUCATION/TRAINING PROGRAM

## 2023-01-07 PROCEDURE — 85027 COMPLETE CBC AUTOMATED: CPT

## 2023-01-07 PROCEDURE — 80048 BASIC METABOLIC PNL TOTAL CA: CPT

## 2023-01-07 RX ORDER — ASCORBIC ACID 500 MG
1500 TABLET ORAL DAILY
Status: DISCONTINUED | OUTPATIENT
Start: 2023-01-07 | End: 2023-01-08 | Stop reason: HOSPADM

## 2023-01-07 RX ORDER — ATORVASTATIN CALCIUM 40 MG/1
40 TABLET, FILM COATED ORAL DAILY
Status: DISCONTINUED | OUTPATIENT
Start: 2023-01-07 | End: 2023-01-08 | Stop reason: HOSPADM

## 2023-01-07 RX ORDER — NITROFURANTOIN 25; 75 MG/1; MG/1
100 CAPSULE ORAL 2 TIMES DAILY
Qty: 14 CAPSULE | Refills: 0 | Status: SHIPPED | OUTPATIENT
Start: 2023-01-07 | End: 2023-01-14

## 2023-01-07 RX ORDER — TAMSULOSIN HYDROCHLORIDE 0.4 MG/1
0.4 CAPSULE ORAL
Status: DISCONTINUED | OUTPATIENT
Start: 2023-01-07 | End: 2023-01-08 | Stop reason: HOSPADM

## 2023-01-07 RX ADMIN — ENOXAPARIN SODIUM 40 MG: 40 INJECTION SUBCUTANEOUS at 18:27

## 2023-01-07 RX ADMIN — ACETAMINOPHEN 650 MG: 325 TABLET ORAL at 20:14

## 2023-01-07 RX ADMIN — MEROPENEM 500 MG: 500 INJECTION, POWDER, FOR SOLUTION INTRAVENOUS at 20:17

## 2023-01-07 RX ADMIN — ATORVASTATIN CALCIUM 40 MG: 40 TABLET, FILM COATED ORAL at 08:34

## 2023-01-07 RX ADMIN — ACETAMINOPHEN 650 MG: 325 TABLET ORAL at 14:27

## 2023-01-07 RX ADMIN — OXYCODONE HYDROCHLORIDE AND ACETAMINOPHEN 1500 MG: 500 TABLET ORAL at 08:34

## 2023-01-07 RX ADMIN — MEROPENEM 500 MG: 500 INJECTION, POWDER, FOR SOLUTION INTRAVENOUS at 14:23

## 2023-01-07 RX ADMIN — ACETAMINOPHEN 650 MG: 325 TABLET ORAL at 02:38

## 2023-01-07 RX ADMIN — ACETAMINOPHEN 650 MG: 325 TABLET ORAL at 08:33

## 2023-01-07 RX ADMIN — MEROPENEM 500 MG: 500 INJECTION, POWDER, FOR SOLUTION INTRAVENOUS at 08:48

## 2023-01-07 RX ADMIN — DOCUSATE SODIUM 50 MG AND SENNOSIDES 8.6 MG 2 TABLET: 8.6; 5 TABLET, FILM COATED ORAL at 18:27

## 2023-01-07 ASSESSMENT — PATIENT HEALTH QUESTIONNAIRE - PHQ9
1. LITTLE INTEREST OR PLEASURE IN DOING THINGS: NOT AT ALL
2. FEELING DOWN, DEPRESSED, IRRITABLE, OR HOPELESS: NOT AT ALL
SUM OF ALL RESPONSES TO PHQ9 QUESTIONS 1 AND 2: 0

## 2023-01-07 ASSESSMENT — LIFESTYLE VARIABLES
DOES PATIENT WANT TO STOP DRINKING: NO
CONSUMPTION TOTAL: NEGATIVE
TOTAL SCORE: 0
HAVE YOU EVER FELT YOU SHOULD CUT DOWN ON YOUR DRINKING: NO
TOTAL SCORE: 0
AVERAGE NUMBER OF DAYS PER WEEK YOU HAVE A DRINK CONTAINING ALCOHOL: 1
EVER FELT BAD OR GUILTY ABOUT YOUR DRINKING: NO
HOW MANY TIMES IN THE PAST YEAR HAVE YOU HAD 5 OR MORE DRINKS IN A DAY: 0
ON A TYPICAL DAY WHEN YOU DRINK ALCOHOL HOW MANY DRINKS DO YOU HAVE: 1
ALCOHOL_USE: YES
EVER HAD A DRINK FIRST THING IN THE MORNING TO STEADY YOUR NERVES TO GET RID OF A HANGOVER: NO
HAVE PEOPLE ANNOYED YOU BY CRITICIZING YOUR DRINKING: NO
TOTAL SCORE: 0

## 2023-01-07 ASSESSMENT — ENCOUNTER SYMPTOMS
DEPRESSION: 0
MYALGIAS: 1
FEVER: 0
DOUBLE VISION: 0
HEADACHES: 0
ABDOMINAL PAIN: 0
NECK PAIN: 0
WEAKNESS: 1
CHILLS: 1
NAUSEA: 0
SHORTNESS OF BREATH: 0
ORTHOPNEA: 0
BLURRED VISION: 0
PALPITATIONS: 0
DIZZINESS: 0
SPUTUM PRODUCTION: 0
VOMITING: 0
COUGH: 0
SORE THROAT: 0
FOCAL WEAKNESS: 0
HEARTBURN: 0

## 2023-01-07 ASSESSMENT — PAIN DESCRIPTION - PAIN TYPE: TYPE: ACUTE PAIN

## 2023-01-07 NOTE — CARE PLAN
The patient is Stable - Low risk of patient condition declining or worsening         Progress made toward(s) clinical / shift goals:  Patient aware of plan of care      Problem: Knowledge Deficit - Standard  Goal: Patient and family/care givers will demonstrate understanding of plan of care, disease process/condition, diagnostic tests and medications  Outcome: Progressing       Patient is not progressing towards the following goals:

## 2023-01-07 NOTE — PROGRESS NOTES
Hospital Medicine Daily Progress Note    Date of Service  1/7/2023    Chief Complaint  Uday Osman is a 72 y.o. male presented 1/6/2023 with fever, bodyaches and chills    Hospital Course  73 y/o M with a history of TURP (12/6/2022; follows with Dr. Garcia) and HLD presented with a concern of ongoing ESBL UTI. He presented with persistent fevers, chills, body ache, urinary frequent and at times, difficult urinating. Patient reported that he has had symptoms for several weeks.  He reports that he had a TURP procedure performed on December 6.  He was found to have a UTI after a checkup on 12/25. Cultures returned positive for ESBL.  He was on ciprofloxacin initially and then doxycycline (for 10 days after noting ESBL) as an outpatient.     In ED, workups were concerning for recurrent/ongoing/incomplete treatment of ESBL UTI - Pt was started on Merrem and then referred to Hospital medicine for further management. Foster catheter was inserted as he was having a difficulty urinating.     Interval Problem Update  1/7/2023  Vitals reviewed; afebrile.  The rest of the vitals within normal parameters.  Pain scale reported - 3 in penis  I/O - UO about 2.5L since foster insertion    At bedside, no acute complain from patient: wondered about foster and Abx plan.     Urology consult appreciated - plan is to DC with foster and outpatient follow up.    Ucx prelim still is growing lactose fermenting gram (-) leilani - ID consult appreciated - c/w IV merrem and follow sensitivities; oral Abx may be an option if sensitive.     At this point, Pt is to remain in the hospital till culture resulted on IV Abx and care will exceed 2MN.     I have discussed this patient's plan of care and discharge plan at IDT rounds today with Case Management, Nursing, Nursing leadership, and other members of the IDT team.    Consultants/Specialty  infectious disease and urology    Code Status  Full Code    Disposition  Patient is not medically cleared  for discharge.   Anticipate discharge to to home with close outpatient follow-up.  I have placed the appropriate orders for post-discharge needs.    Review of Systems  Review of Systems   Constitutional:  Positive for chills and malaise/fatigue. Negative for fever.   HENT:  Negative for congestion and sore throat.    Eyes:  Negative for blurred vision and double vision.   Respiratory:  Negative for cough, sputum production and shortness of breath.    Cardiovascular:  Negative for chest pain, palpitations, orthopnea and leg swelling.   Gastrointestinal:  Negative for abdominal pain, heartburn, nausea and vomiting.   Genitourinary:  Positive for frequency. Negative for dysuria and urgency.   Musculoskeletal:  Positive for myalgias. Negative for neck pain.   Neurological:  Positive for weakness. Negative for dizziness, focal weakness and headaches.   Psychiatric/Behavioral:  Negative for depression.       Physical Exam  Temp:  [36.9 °C (98.4 °F)-37.5 °C (99.5 °F)] 37.3 °C (99.2 °F)  Pulse:  [76-99] 76  Resp:  [14-20] 18  BP: (118-174)/(63-98) 135/70  SpO2:  [93 %-98 %] 94 %    Physical Exam  Vitals and nursing note reviewed.   Constitutional:       General: He is not in acute distress.     Appearance: Normal appearance. He is not ill-appearing.   HENT:      Head: Normocephalic and atraumatic.      Mouth/Throat:      Mouth: Mucous membranes are moist.      Pharynx: Oropharynx is clear.   Eyes:      General: No scleral icterus.     Conjunctiva/sclera: Conjunctivae normal.   Cardiovascular:      Rate and Rhythm: Normal rate and regular rhythm.      Pulses: Normal pulses.      Heart sounds: Normal heart sounds.   Pulmonary:      Effort: Pulmonary effort is normal. No respiratory distress.      Breath sounds: Normal breath sounds. No wheezing.   Abdominal:      General: Bowel sounds are normal. There is no distension.      Palpations: Abdomen is soft.      Tenderness: There is no abdominal tenderness.   Musculoskeletal:          General: No swelling or tenderness. Normal range of motion.   Skin:     General: Skin is warm and dry.      Capillary Refill: Capillary refill takes less than 2 seconds.   Neurological:      General: No focal deficit present.      Mental Status: He is alert and oriented to person, place, and time. Mental status is at baseline.   Psychiatric:         Mood and Affect: Mood normal.       Fluids    Intake/Output Summary (Last 24 hours) at 1/7/2023 1430  Last data filed at 1/7/2023 0945  Gross per 24 hour   Intake 240 ml   Output 2575 ml   Net -2335 ml       Laboratory  Recent Labs     01/06/23  1754 01/07/23  0105   WBC 9.1 9.2   RBC 4.23* 3.78*   HEMOGLOBIN 13.0* 11.7*   HEMATOCRIT 39.0* 35.1*   MCV 92.2 92.9   MCH 30.7 31.0   MCHC 33.3* 33.3*   RDW 44.6 45.1   PLATELETCT 314 291   MPV 8.8* 9.1     Recent Labs     01/06/23 1754 01/07/23  0105   SODIUM 139 139   POTASSIUM 3.8 3.9   CHLORIDE 106 106   CO2 25 26   GLUCOSE 92 134*   BUN 12 12   CREATININE 1.21 1.09   CALCIUM 9.0 8.8                   Imaging  No orders to display        Assessment/Plan  * UTI due to extended-spectrum beta lactamase (ESBL) producing Escherichia coli- (present on admission)  Assessment & Plan  Culture from 12/25 growing ESBL.  Patient failed ciprofloxacin/doxycycline as an outpatient.  Admitted and started on IV Merrem.  Ucx prelim still is growing lactose fermenting gram (-) leilani     ID consult appreciated - c/w IV merrem and follow sensitivities; oral Abx (microbid) may be an option if sensitive.     Urine retention- (present on admission)  Assessment & Plan  Patient with urinary retention.  Foster catheter placed.  Patient with history of TURP  Urology consult appreciated - plan is to DC with foster and outpatient follow up.    Mixed hyperlipidemia- (present on admission)  Assessment & Plan  C/w statin         VTE prophylaxis: enoxaparin ppx    I have performed a physical exam and reviewed and updated ROS and Plan today (1/7/2023).

## 2023-01-07 NOTE — CONSULTS
INFECTIOUS DISEASES INPATIENT CONSULT NOTE     Date of Service: 1/7/2023    Consult Requested By: Alton Kam M.D.    Reason for Consultation: Acute cystitis    History of Present Illness:   Uday Osman is a 72 y.o. man with a history of hyperlipidemia, urinary retention and recent TURP on 12/6/2022 admitted 1/6/2023 secondary to persistent dysuria.  Patient states that prior to his TURP on 12/6/2022, he had a chronic Javed catheter in place for approximately 10 weeks.  He does have a history of urinary retention.  His catheter was removed 6 days postprocedure.  Thereafter, he developed persistent dysuria and mild hematuria which he initially attributed to the natural course of having her procedure.  Denies any recent fevers or chills.  He denied any flank pain.  Patient was seen in the urgent care on 12/25 and was prescribed ciprofloxacin.  The urine culture grew ESBL E. coli which was resistant to ciprofloxacin.  The patient was notified and he was changed to doxycycline.  Patient states that while he was on doxycycline, he had some mild improvement in his symptoms however he then developed 1 episode of nausea and vomiting in addition to persistent dysuria.  Given persistent symptoms, he presented to the ED for further evaluation and management.  On presentation, he was afebrile with a normal white count.  Urinalysis showed positive nitrite, packed WBCs and moderate bacteria.  Urine culture is growing a lactose fermenting gram-negative leilani.  Blood cultures on admission are negative to date.  He is currently on meropenem with clinical improvement.  Patient is anxious to be discharged soon.  Infectious disease service consulted for antibiotic recommendations.      All other review of systems reviewed and negative except those documented above in the HPI.     PMH:   Past Medical History:   Diagnosis Date    High cholesterol     Snoring     Urinary bladder disorder March 2020    Urinary Retention    UTI due  to extended-spectrum beta lactamase (ESBL) producing Escherichia coli 2023       PSH:  Past Surgical History:   Procedure Laterality Date    CT TRANSURETHRAL ELEC-SURG PROSTATECTOM  2022    Procedure: BIPOLAR TRANSURETHRAL RESECTION OF PROSTATE;  Surgeon: Phillip Higgins M.D.;  Location: SURGERY [facility];  Service: Urology    VENTRAL HERNIA REPAIR  2015    Performed by Sánchez Mayorga M.D. at SURGERY [facility] ORS    COLONOSCOPY  01/01/2013    X 2    OTHER ORTHOPEDIC SURGERY      fx finger/ age 15       FAMILY HX:  Family History   Problem Relation Age of Onset    Glaucoma Mother     Diabetes Father     Heart Disease Father     Hypertension Father     Hyperlipidemia Father     Stroke Father        SOCIAL HX:  Social History     Socioeconomic History    Marital status:      Spouse name: Not on file    Number of children: Not on file    Years of education: Not on file    Highest education level: Associate degree: occupational, technical, or vocational program   Occupational History    Not on file   Tobacco Use    Smoking status: Former     Packs/day: 1.00     Years: 25.00     Pack years: 25.00     Types: Cigarettes     Start date: 10/1/1964     Quit date: 1990     Years since quittin.0    Smokeless tobacco: Never   Vaping Use    Vaping Use: Never used   Substance and Sexual Activity    Alcohol use: Yes     Comment: very occasional    Drug use: Yes     Types: Marijuana     Comment: occasional Cannabis edible rarely    Sexual activity: Not Currently   Other Topics Concern    Not on file   Social History Narrative    Not on file     Social Determinants of Health     Financial Resource Strain: Low Risk     Difficulty of Paying Living Expenses: Not hard at all   Food Insecurity: No Food Insecurity    Worried About Running Out of Food in the Last Year: Never true    Ran Out of Food in the Last Year: Never true   Transportation Needs: No Transportation Needs    Lack of Transportation  (Medical): No    Lack of Transportation (Non-Medical): No   Physical Activity: Sufficiently Active    Days of Exercise per Week: 3 days    Minutes of Exercise per Session: 50 min   Stress: No Stress Concern Present    Feeling of Stress : Not at all   Social Connections: Moderately Integrated    Frequency of Communication with Friends and Family: More than three times a week    Frequency of Social Gatherings with Friends and Family: More than three times a week    Attends Islam Services: Never    Active Member of Clubs or Organizations: Yes    Attends Club or Organization Meetings: More than 4 times per year    Marital Status:    Intimate Partner Violence: Not At Risk    Fear of Current or Ex-Partner: No    Emotionally Abused: No    Physically Abused: No    Sexually Abused: No   Housing Stability: Low Risk     Unable to Pay for Housing in the Last Year: No    Number of Places Lived in the Last Year: 1    Unstable Housing in the Last Year: No     Social History     Tobacco Use   Smoking Status Former    Packs/day: 1.00    Years: 25.00    Pack years: 25.00    Types: Cigarettes    Start date: 10/1/1964    Quit date: 1990    Years since quittin.0   Smokeless Tobacco Never     Social History     Substance and Sexual Activity   Alcohol Use Yes    Comment: very occasional       Allergies/Intolerances:  No Known Allergies    History reviewed with the patient     Other Current Medications:    Current Facility-Administered Medications:     ascorbic acid (Vitamin C) tablet 1,500 mg, 1,500 mg, Oral, DAILY, Alton Kam M.D., 1,500 mg at 23    atorvastatin (LIPITOR) tablet 40 mg, 40 mg, Oral, DAILY, Alton Kam M.D., 40 mg at 23 0834    tamsulosin (FLOMAX) capsule 0.4 mg, 0.4 mg, Oral, AFTER BREAKFAST, Alton Kam M.D.    meropenem (Merrem) 500 mg in  mL IV-MBP, 500 mg, Intravenous, Q8HRS, Alton Kam M.D., Stopped at 23 0918    senna-docusate (PERICOLACE or SENOKOT S) 8.6-50 MG per  "tablet 2 Tablet, 2 Tablet, Oral, BID **AND** polyethylene glycol/lytes (MIRALAX) PACKET 1 Packet, 1 Packet, Oral, QDAY PRN **AND** magnesium hydroxide (MILK OF MAGNESIA) suspension 30 mL, 30 mL, Oral, QDAY PRN **AND** bisacodyl (DULCOLAX) suppository 10 mg, 10 mg, Rectal, QDAY PRN, Johnny Rueda D.O.    enoxaparin (Lovenox) inj 40 mg, 40 mg, Subcutaneous, DAILY AT 1800, Johnny Rueda D.O., 40 mg at 23 2134    acetaminophen (Tylenol) tablet 650 mg, 650 mg, Oral, Q6HRS PRN, Johnny Rueda D.O., 650 mg at 23 0833    labetalol (NORMODYNE/TRANDATE) injection 10 mg, 10 mg, Intravenous, Q4HRS PRN, Johnny Rueda D.O.    ondansetron (ZOFRAN) syringe/vial injection 4 mg, 4 mg, Intravenous, Q4HRS PRN, Johnny Rueda D.O.    ondansetron (ZOFRAN ODT) dispertab 4 mg, 4 mg, Oral, Q4HRS PRN, Johnny Rueda D.O.  [unfilled]    Most Recent Vital Signs:  /70   Pulse 76   Temp 37.3 °C (99.2 °F) (Temporal)   Resp 18   Ht 1.727 m (5' 8\")   Wt 90.6 kg (199 lb 11.8 oz)   SpO2 94%   BMI 30.37 kg/m²   Temp  Av.2 °C (98.9 °F)  Min: 36.9 °C (98.4 °F)  Max: 37.5 °C (99.5 °F)    Physical Exam:  General: well nourished, no diaphoresis, well-appearing, no acute distress  HEENT: sclera anicteric, PERRL, extraocular muscles intact, mucous membranes moist, oropharynx clear and moist, no oral lesions or exudate  Neck: supple, no lymphadenopathy  Chest: CTAB, no rales, rhonchi or wheezes, normal work of breathing.  Cardiac: regular rate and rhythm, normal S1 S2, no murmurs, rubs or gallops  Abdomen: + bowel sounds, soft, non-tender, non-distended, no hepatosplenomegaly, no CVA tenderness bilaterally  : Javed catheter in place  Extremities: WWP, no edema, 2+ pedal pulses  Skin: warm and dry, no rashes or worrisome lesions  Neuro: Alert and oriented times 3, non-focal exam, speech fluent, full range of motion to bilateral upper and lower extremities  Psych: normal mood and behavior, pleasant; memory " "intact, normal judgement    Pertinent Lab Results:  Recent Labs     01/06/23  1754 01/07/23  0105   WBC 9.1 9.2      Recent Labs     01/06/23  1754 01/07/23  0105   HEMOGLOBIN 13.0* 11.7*   HEMATOCRIT 39.0* 35.1*   MCV 92.2 92.9   MCH 30.7 31.0   PLATELETCT 314 291         Recent Labs     01/06/23  1754 01/07/23  0105   SODIUM 139 139   POTASSIUM 3.8 3.9   CHLORIDE 106 106   CO2 25 26   CREATININE 1.21 1.09        Recent Labs     01/06/23 1754   ALBUMIN 3.7        Pertinent Micro:  Results       Procedure Component Value Units Date/Time    URINE CULTURE(NEW) [[id number]]  (Abnormal) Collected: 01/06/23 1636    Order Status: Completed Specimen: Urine Updated: 01/07/23 1253     Significant Indicator POS     Source UR     Site -     Culture Result -      Lactose fermenting Gram negative leilani  >100,000 cfu/mL      Narrative:      Indication for culture:->New onset fever with no other  identified cause    Blood Culture [[id number]] Collected: 01/06/23 1754    Order Status: Completed Specimen: Blood from Peripheral Updated: 01/07/23 0754     Significant Indicator NEG     Source BLD     Site PERIPHERAL     Culture Result No Growth  Note: Blood cultures are incubated for 5 days and  are monitored continuously.Positive blood cultures  are called to the RN and reported as soon as  they are identified.      Narrative:      1 of 2 for Blood Culture x 2 sites order. Per Hospital  Policy: Only change Specimen Src: to \"Line\" if specified by  physician order.  No site indicated    Blood Culture [[id number]] Collected: 01/06/23 1911    Order Status: Completed Specimen: Blood from Peripheral Updated: 01/07/23 0754     Significant Indicator NEG     Source BLD     Site PERIPHERAL     Culture Result No Growth  Note: Blood cultures are incubated for 5 days and  are monitored continuously.Positive blood cultures  are called to the RN and reported as soon as  they are identified.      Narrative:      2 of 2 blood culture x2  Sites order. Per " "Hospital Policy:  Only change Specimen Src: to \"Line\" if specified by physician  order.  Right AC    URINALYSIS,CULTURE IF INDICATED [[id number]]  (Abnormal) Collected: 01/06/23 1636    Order Status: Completed Specimen: Urine, Clean Catch Updated: 01/06/23 1717     Color Yellow     Character Turbid     Specific Gravity 1.011     Ph 6.0     Glucose Negative mg/dL      Ketones Negative mg/dL      Protein 100 mg/dL      Bilirubin Negative     Urobilinogen, Urine 0.2     Nitrite Positive     Leukocyte Esterase Large     Occult Blood Moderate     Micro Urine Req Microscopic    Narrative:      Indication for culture:->New onset fever with no other  identified cause          No results found for: BLOODCULTU, BLDCULT, BCHOLD     Studies:  No results found.    IMPRESSION:   1.  Cystitis   2.  Recent ESBL E. coli UTI  3.  Recent TURP on 12/6/2022  4.  Urinary retention    PLAN:   Uday Osman is a 72 y.o. man with a history of hyperlipidemia, urinary retention, TURP on 12/6/2022 and recently diagnosed urinary tract infection with ESBL E. coli admitted on 1/6/2023 secondary to persistent dysuria.  Urinalysis consistent with infection and urine culture now growing a lactose fermenting gram-negative leilani which I suspect to be an ESBL E. coli again.  Blood cultures on admission are negative to date.    -Continue IV meropenem 500 mg every 6 hours  -Urine culture-lactose fermenting gram-negative leilani.  Follow speciation and susceptibilities  -Anticipate a 7-day course of antibiotics from 1/6 with stop date of 1/13/2023  -If Macrobid susceptible patient can be discharged on Macrobid.  If however Macrobid is resistant, patient can be discharged on fosfomycin.    Plan of care discussed with SHIVA aKm M.D.. Will continue to follow    Luann Martínez M.D.      Please note that this dictation was created using voice recognition software. I have worked with technical experts from [employer] to optimize the interface.  I " have made every reasonable attempt to correct obvious errors, but there may be errors of grammar and possibly content that I did not discover before finalizing the note.

## 2023-01-07 NOTE — ASSESSMENT & PLAN NOTE
Patient with urinary retention.  Foster catheter placed.  Patient with history of TURP  Urology consult appreciated - plan is to DC with foster and outpatient follow up.

## 2023-01-07 NOTE — ED TRIAGE NOTES
"Uday Osman  72 y.o.    Chief Complaint   Patient presents with    Post-Op Complications     Pt recently had a TURP on December 6th and had been prescribed antibiotics. Pt reports he just finished his medications today and feels as he has a UTI with fever, body aches and chills. Pt did have a recent UTI on yvonne day and was prescribed doxycyline.     Fever       Blood Pressure : (!) 174/98, Pulse: 92, Respiration: 14, Temperature: 36.9 °C (98.5 °F), Height: 172.7 cm (5' 8\"), Weight: 91.6 kg (202 lb), BMI (Calculated): 30.71, BSA (Calculated): 2.1, Pulse Oximetry: 98 %    Protocol placed, pt educated to alert staff with any changes or concerns.   "

## 2023-01-07 NOTE — ASSESSMENT & PLAN NOTE
>>ASSESSMENT AND PLAN FOR RETENTION OF URINE WRITTEN ON 1/7/2023  2:29 PM BY ABDULAZIZ COATES M.D.    Patient with urinary retention.  Foster catheter placed.  Patient with history of TURP  Urology consult appreciated - plan is to DC with foster and outpatient follow up.

## 2023-01-07 NOTE — DISCHARGE PLANNING
"HTH/SCP TCN chart review completed. Collaborated with MD/Henrietta post to meeting with the pt. The most current review of medical record, knowledge of pt's PLOF and social support, LACE+ score of 25, no 6 click mobility scores documented.     Pt seen at bedside. Introduced TCN program. Provided education regarding post acute levels of care. Discussed HTH/SCP plan benefits (Meds to Beds, medical uber and [facility] transitional care). Pt verbalizes understanding.     Patient reports no concerns for functional mobility, equipment needs, transportation or food insecurity.  Reports he is \"anxiously\" waiting to return home.  Reports home discharge preferred.  Reports he has no been up with nursing, but believes he would have no issue with ambulation.     Patient agreeable to [facility] after discussion of transitional care.  Reports he will likely decline, but it is good to have as a backup plan.     OP choice proactively obtained. TCN will continue to follow and collaborate with discharge planning team as additional post acute needs arise. Thank you.     Completed today:  OP Infusion choice gathered - 1/7  [facility] referral sent - 1/7    *Post discussion with MD, considering OP Infusion.  Choice obtained from patient, faxed to [facility]RI[facility].   "

## 2023-01-07 NOTE — PROGRESS NOTES
4 Eyes Skin Assessment Completed by ARY Boyle and ARY Bob.    Head WDL  Ears WDL  Nose WDL  Mouth WDL  Neck WDL  Breast/Chest WDL  Shoulder Blades WDL  Spine WDL  (R) Arm/Elbow/Hand WDL  (L) Arm/Elbow/Hand WDL  Abdomen WDL  Groin WDL  Scrotum/Coccyx/Buttocks WDL  (R) Leg WDL  (L) Leg WDL  (R) Heel/Foot/Toe WDL  (L) Heel/Foot/Toe WDL          Devices In Places Blood Pressure Cuff, Pulse Ox, and Javed      Interventions In Place N/A    Possible Skin Injury No    Pictures Uploaded Into Epic N/A  Wound Consult Placed N/A  RN Wound Prevention Protocol Ordered No

## 2023-01-07 NOTE — ED NOTES
Med Rec complete per patient  Allergies reviewed  -Ciprofloxacin was replaced with Doxycycline, patient reports he has 1 tablet left   -Discontinued Flomax per Urologist

## 2023-01-07 NOTE — H&P
Hospital Medicine History & Physical Note    Date of Service  1/6/2023    Primary Care Physician  Jesi Najera M.D.    Consultants  urology    Specialist Names: Dr Last    Code Status  Full Code    Chief Complaint  Chief Complaint   Patient presents with    Post-Op Complications     Pt recently had a TURP on December 6th and had been prescribed antibiotics. Pt reports he just finished his medications today and feels as he has a UTI with fever, body aches and chills. Pt did have a recent UTI on yvonne day and was prescribed doxycyline.     Fever       History of Presenting Illness  Uday Osman is a 72 y.o. male who presented 1/6/2023 with a history of TURP who presents for ESBL UTI.  Patient reports that he has had symptoms for several weeks.  He reports that he had a TURP procedure performed on December 6.  He was found to have a UTI after a checkup a couple weeks ago.  Cultures returned positive for ESBL.  He was on ciprofloxacin and then doxycycline as an outpatient.  Follows with Dr. Garcia as an outpatient.  Patient reports that he has had difficulty urinating and frequency.  He reports general malaise..    I discussed the plan of care with patient.    Review of Systems  Review of Systems   Constitutional:  Positive for malaise/fatigue. Negative for chills and fever.   Respiratory:  Negative for shortness of breath.    Cardiovascular:  Negative for chest pain and palpitations.   Gastrointestinal:  Negative for abdominal pain, nausea and vomiting.   Genitourinary:  Positive for dysuria and frequency.   All other systems reviewed and are negative.    Past Medical History   has a past medical history of High cholesterol, Snoring, Urinary bladder disorder (March 2020), and UTI due to extended-spectrum beta lactamase (ESBL) producing Escherichia coli (1/6/2023).    Surgical History   has a past surgical history that includes other orthopedic surgery; colonoscopy (01/01/2013); ventral hernia repair  (04/22/2015); and pr transurethral elec-surg prostatectom (12/6/2022).     Family History  family history includes Diabetes in his father; Glaucoma in his mother; Heart Disease in his father; Hyperlipidemia in his father; Hypertension in his father; Stroke in his father.   Family history reviewed with patient. There is no family history that is pertinent to the chief complaint.     Social History   reports that he quit smoking about 33 years ago. His smoking use included cigarettes. He started smoking about 58 years ago. He has a 25.00 pack-year smoking history. He has never used smokeless tobacco. He reports current alcohol use. He reports current drug use. Drug: Marijuana.    Allergies  No Known Allergies    Medications  Prior to Admission Medications   Prescriptions Last Dose Informant Patient Reported? Taking?   ascorbic acid (ASCORBIC ACID) 500 MG TABS  Patient Yes No   Sig: Take 1,500 mg by mouth every day.   atorvastatin (LIPITOR) 40 MG Tab   No No   Sig: TAKE 1 TABLET BY MOUTH EVERY DAY   cetirizine (ZYRTEC) 10 MG Tab   Yes No   Sig: Take 10 mg by mouth every day.   docusate sodium (COLACE) 100 MG Cap   Yes No   Sig: Take 1 Capsule by mouth 2 times a day.   doxycycline (MONODOX) 100 MG capsule   Yes No   Sig: doxycycline monohydrate 100 mg capsule   TAKE 1 CAPSULE BY MOUTH TWICE A DAY FOR 10 DAYS   psyllium (METAMUCIL) 58.12 % Pack   Yes No   Sig: Take 1 Packet by mouth 2 times a day.      Facility-Administered Medications: None       Physical Exam  Temp:  [36.9 °C (98.5 °F)-37.1 °C (98.7 °F)] 36.9 °C (98.5 °F)  Pulse:  [88-93] 93  Resp:  [14-20] 14  BP: (118-174)/(68-98) 158/72  SpO2:  [95 %-98 %] 95 %  Blood Pressure : (!) 159/90   Temperature: 36.9 °C (98.5 °F)   Pulse: 88   Respiration: 14   Pulse Oximetry: 98 %       Physical Exam  Vitals reviewed.   Constitutional:       General: He is not in acute distress.     Appearance: He is not ill-appearing.   HENT:      Head: Normocephalic and atraumatic.    Eyes:      General: No scleral icterus.        Right eye: No discharge.         Left eye: No discharge.   Cardiovascular:      Rate and Rhythm: Normal rate and regular rhythm.      Heart sounds: Normal heart sounds.   Pulmonary:      Effort: No respiratory distress.      Breath sounds: No wheezing.   Abdominal:      General: There is no distension.      Tenderness: There is no abdominal tenderness. There is no guarding.   Musculoskeletal:         General: No tenderness.      Right lower leg: No edema.      Left lower leg: No edema.   Skin:     General: Skin is warm and dry.      Coloration: Skin is not jaundiced.   Neurological:      General: No focal deficit present.      Mental Status: He is alert and oriented to person, place, and time.      Cranial Nerves: No cranial nerve deficit.   Psychiatric:         Mood and Affect: Mood normal.         Behavior: Behavior normal.         Thought Content: Thought content normal.       Laboratory:  Recent Labs     01/06/23  1754   WBC 9.1   RBC 4.23*   HEMOGLOBIN 13.0*   HEMATOCRIT 39.0*   MCV 92.2   MCH 30.7   MCHC 33.3*   RDW 44.6   PLATELETCT 314   MPV 8.8*     Recent Labs     01/06/23  1754   SODIUM 139   POTASSIUM 3.8   CHLORIDE 106   CO2 25   GLUCOSE 92   BUN 12   CREATININE 1.21   CALCIUM 9.0     Recent Labs     01/06/23  1754   ALTSGPT 23   ASTSGOT 25   ALKPHOSPHAT 198*   TBILIRUBIN 0.4   GLUCOSE 92         No results for input(s): NTPROBNP in the last 72 hours.      No results for input(s): TROPONINT in the last 72 hours.    Imaging:  No orders to display           Assessment/Plan:  Justification for Admission Status  I anticipate this patient is appropriate for observation status at this time because acute UTI    Patient will need a  bed on EMERGENCY service .  The need is secondary to acute UTI.    * UTI due to extended-spectrum beta lactamase (ESBL) producing Escherichia coli- (present on admission)  Assessment & Plan  Culture from 12/25 growing ESBL.  Patient  failed ciprofloxacin as an outpatient.  Admitted for Merrem.  Patient may be a candidate for fosfomycin    Urine retention- (present on admission)  Assessment & Plan  Patient with urinary retention.  Javed catheter placed.  Patient with history of TURP  Urology was consulted in the ED        VTE prophylaxis: enoxaparin ppx

## 2023-01-07 NOTE — ED PROVIDER NOTES
ER Provider Note    Scribed for Stevie De Paz M.d. by Barak Britt. 1/6/2023  5:21 PM    Primary Care Provider: Jesi Najera M.D.    CHIEF COMPLAINT  Chief Complaint   Patient presents with    Post-Op Complications     Pt recently had a TURP on December 6th and had been prescribed antibiotics. Pt reports he just finished his medications today and feels as he has a UTI with fever, body aches and chills. Pt did have a recent UTI on yvonne day and was prescribed doxycyline.     Fever     EXTERNAL RECORDS REVIEWED  Select: Outpatient Notes Patient had a recept TURP procedure. He was seen today by his family doctor, and is still having fevers/chills/body aches after 10 days of antibiotics. His urine has grown out ESBL E.Coli. He was switched to doxycycline to try and target it, with no improvement. and Outpatient labs & studies including urine culture    HPI/ROS  LIMITATION TO HISTORY   Select: : None  OUTSIDE HISTORIAN(S):  Select: None    Uday Osman is a 72 y.o. male who presents to the ED for evaluation of fever onset earlier today after a course of antibiotics. The patient states he also has body aches, chills, and a suspected UTI, but denies any pain with urination. He describes recently undergoing a TURP procedure on December 6th, after which he was prescribed antibiotics. He has been diagnosed with a UTI since then, and was prescribed Ciprofloxacin and then Doxycycline. He was evaluated by his primary care provider earlier today, who sent him here for suspected continued UTI. No alleviating or exacerbating factors were noted.  He is followed by Dr. Higgins (Urology).    PAST MEDICAL HISTORY  Past Medical History:   Diagnosis Date    High cholesterol     Snoring     Urinary bladder disorder March 2020    Urinary Retention       SURGICAL HISTORY  Past Surgical History:   Procedure Laterality Date    MO TRANSURETHRAL ELEC-SURG PROSTATECTOM  12/6/2022    Procedure: BIPOLAR TRANSURETHRAL RESECTION OF  "PROSTATE;  Surgeon: Phillip Higgins M.D.;  Location: SURGERY [facility];  Service: Urology    VENTRAL HERNIA REPAIR  04/22/2015    Performed by Sánchez Mayorga M.D. [facility]    COLONOSCOPY  01/01/2013    X 2    OTHER ORTHOPEDIC SURGERY      fx finger/ age 15       FAMILY HISTORY  Family History   Problem Relation Age of Onset    Glaucoma Mother     Diabetes Father     Heart Disease Father     Hypertension Father     Hyperlipidemia Father     Stroke Father        SOCIAL HISTORY   reports that he quit smoking about 33 years ago. His smoking use included cigarettes. He started smoking about 58 years ago. He has a 25.00 pack-year smoking history. He has never used smokeless tobacco. He reports current alcohol use. He reports current drug use. Drug: Marijuana.    CURRENT MEDICATIONS  Previous Medications    ASCORBIC ACID (ASCORBIC ACID) 500 MG TABS    Take 1,500 mg by mouth every day.    ATORVASTATIN (LIPITOR) 40 MG TAB    TAKE 1 TABLET BY MOUTH EVERY DAY    CETIRIZINE (ZYRTEC) 10 MG TAB    Take 10 mg by mouth every day.    DOCUSATE SODIUM (COLACE) 100 MG CAP    Take 1 Capsule by mouth 2 times a day.    DOXYCYCLINE (MONODOX) 100 MG CAPSULE    doxycycline monohydrate 100 mg capsule   TAKE 1 CAPSULE BY MOUTH TWICE A DAY FOR 10 DAYS    PSYLLIUM (METAMUCIL) 58.12 % PACK    Take 1 Packet by mouth 2 times a day.     ALLERGIES  Patient has no known allergies.    PHYSICAL EXAM  BP (!) 174/98   Pulse 92   Temp 36.9 °C (98.5 °F) (Temporal)   Resp 14   Ht 1.727 m (5' 8\")   Wt 91.6 kg (202 lb)   SpO2 98%   BMI 30.71 kg/m²   Constitutional: Well developed, Well nourished, No acute distress, Non-toxic appearance.   HENT: Normocephalic, Atraumatic, mucous membranes moist, no erythema, exudates, swelling, or masses, nares patent  Eyes: nonicteric  Neck: Supple, no meningismus  Lymphatic: No lymphadenopathy noted.   Cardiovascular: Regular rate and rhythm, no gallops rubs or murmurs  Lungs: Clear bilaterally "   Abdomen: Soft and nontender throughout  Skin: Warm, Dry, no rash  Genitalia: Deferred  Rectal: Deferred  Extremities: No edema  Neurologic: Alert, appropriate, follows commands, moving all extremities, normal speech   Psychiatric: Affect normal     DIAGNOSTIC STUDIES    Labs:   Results for orders placed or performed during the hospital encounter of 01/06/23   URINALYSIS,CULTURE IF INDICATED    Specimen: Urine, Clean Catch   Result Value Ref Range    Color Yellow     Character Turbid (A)     Specific Gravity 1.011 <1.035    Ph 6.0 5.0 - 8.0    Glucose Negative Negative mg/dL    Ketones Negative Negative mg/dL    Protein 100 (A) Negative mg/dL    Bilirubin Negative Negative    Urobilinogen, Urine 0.2 Negative    Nitrite Positive (A) Negative    Leukocyte Esterase Large (A) Negative    Occult Blood Moderate (A) Negative    Micro Urine Req Microscopic    URINE MICROSCOPIC (W/UA)   Result Value Ref Range    WBC Packed (A) /hpf    RBC 10-20 (A) /hpf    Bacteria Moderate (A) None /hpf    Epithelial Cells Few /hpf    Hyaline Cast 0-2 /lpf   URINE CULTURE(NEW)    Specimen: Urine   Result Value Ref Range    Significant Indicator NEG     Source UR     Site -     Culture Result -    CBC With Differential   Result Value Ref Range    WBC 9.1 4.8 - 10.8 K/uL    RBC 4.23 (L) 4.70 - 6.10 M/uL    Hemoglobin 13.0 (L) 14.0 - 18.0 g/dL    Hematocrit 39.0 (L) 42.0 - 52.0 %    MCV 92.2 81.4 - 97.8 fL    MCH 30.7 27.0 - 33.0 pg    MCHC 33.3 (L) 33.7 - 35.3 g/dL    RDW 44.6 35.9 - 50.0 fL    Platelet Count 314 164 - 446 K/uL    MPV 8.8 (L) 9.0 - 12.9 fL    Neutrophils-Polys 70.80 44.00 - 72.00 %    Lymphocytes 15.30 (L) 22.00 - 41.00 %    Monocytes 10.50 0.00 - 13.40 %    Eosinophils 1.70 0.00 - 6.90 %    Basophils 0.80 0.00 - 1.80 %    Immature Granulocytes 0.90 0.00 - 0.90 %    Nucleated RBC 0.00 /100 WBC    Neutrophils (Absolute) 6.45 1.82 - 7.42 K/uL    Lymphs (Absolute) 1.39 1.00 - 4.80 K/uL    Monos (Absolute) 0.95 (H) 0.00 - 0.85  K/uL    Eos (Absolute) 0.15 0.00 - 0.51 K/uL    Baso (Absolute) 0.07 0.00 - 0.12 K/uL    Immature Granulocytes (abs) 0.08 0.00 - 0.11 K/uL    NRBC (Absolute) 0.00 K/uL   Comp Metabolic Panel   Result Value Ref Range    Sodium 139 135 - 145 mmol/L    Potassium 3.8 3.6 - 5.5 mmol/L    Chloride 106 96 - 112 mmol/L    Co2 25 20 - 33 mmol/L    Anion Gap 8.0 7.0 - 16.0    Glucose 92 65 - 99 mg/dL    Bun 12 8 - 22 mg/dL    Creatinine 1.21 0.50 - 1.40 mg/dL    Calcium 9.0 8.5 - 10.5 mg/dL    AST(SGOT) 25 12 - 45 U/L    ALT(SGPT) 23 2 - 50 U/L    Alkaline Phosphatase 198 (H) 30 - 99 U/L    Total Bilirubin 0.4 0.1 - 1.5 mg/dL    Albumin 3.7 3.2 - 4.9 g/dL    Total Protein 7.2 6.0 - 8.2 g/dL    Globulin 3.5 1.9 - 3.5 g/dL    A-G Ratio 1.1 g/dL   Lactic Acid   Result Value Ref Range    Lactic Acid 1.1 0.5 - 2.0 mmol/L   CORRECTED CALCIUM   Result Value Ref Range    Correct Calcium 9.2 8.5 - 10.5 mg/dL   ESTIMATED GFR   Result Value Ref Range    GFR (CKD-EPI) 64 >60 mL/min/1.73 m 2      COURSE & MEDICAL DECISION MAKING     ED Observation Status? Yes; I am placing the patient in to an observation status due to a diagnostic uncertainty as well as therapeutic intensity. Patient placed in observation status at 5:24 PM, 1/6/2023.  Patient admitted at 6:48 PM    5:24 PM - Patient was evaluated at bedside. Ordered for Lactic Acid, CBC w/diff, CMP, Lactic Acid, Blood Culture, Urine Microscopic w/UA, UA w/ culture if indicated, and Urine Culture to evaluate. The patient will be medicated with Zosyn 4.5 g in  mL IV for his symptoms. Explained the likely plan to admit. Patient verbalizes understanding and support with my plan of care.       5:39 PM - Paged Urology.    5:40 PM - Spoke with pharmacy regarding the most effective antibiotic choice for this patient's treatment given prior culture analysis. Zosyn order changed for Meropenem 500 mg in   mL IV.     6:03 PM - I discussed the patient's case and the above findings with  Dr. Last (Urology) who suggests doing a bladder scan and agrees to the plan for admission. Ordered for bladder scan to evaluate.     6:24 PM - Bladder scan result is 540 mL post void, with approximately 200 mL output, ordered for Javed catheter. Paged Hospitalist. I later discussed the patient's case and the above findings with the Hospitalist who agrees to evaluate the patient for hospitalization and agrees to admit for ESBL E. Coli with failed outpatient antibiotics.     6:47 PM - Patient discharged from ED Observation. Patient was reevaluated at bedside.  I discussed the patient's diagnostic study results. I informed the patient of my plan to admit today given the patient's current presentation and diagnostic study results. The patient was given an opportunity to ask questions. Patient verbalizes understanding and support with my plan for admission.     INITIAL ASSESSMENT AND PLAN  Care Narrative: The patient presents status post TURP procedure 1 month ago. The patient has had persistent UTIs since then, and has grown out ESBL E. Coli. He has previously been trialed on Ciprofloxacin and Doxycycline, but was noted to still have a UTI this morning with his primary care provider. Plan to administer IV antibiotics, re-assess UA and cultures. Plan to admit.    This is a 72-year-old who presents status post TURP from a month ago.  He is retaining urine with greater than 500 cc postvoid.  A Javed will be placed.  He also has failed outpatient treatment of ESBL UTI.  The patient was initially on Cipro then switched to doxycycline.  After discussion with pharmacy we are placing him on meropenem and will admit.  Case discussed with Dr. Last who is on-call for urology who is aware.    ADDITIONAL PROBLEM LIST AND DISPOSITION  Problem #1: ESBL E. Coli UTI  Problem #2: Fevers/Chills  Problem #3: Body aches    I have discussed management of the patient with the following physicians and KYLE's:  Berhane (Urology) and  Hospitalist.    Discussion of management with other [place] [facility] or appropriate source(s): Select: Pharmacy regarding antibiotic choice      DISPOSITION:  Patient will be hospitalized by the Hospitalist in guarded condition.     FINAL DIANGOSIS  1. Acute UTI         Barak JUDGE (Scribe), am scribing for, and in the presence of, Stevie De Paz M.D..    Electronically signed by: Barak Britt (Leathaibyenifer), 1/6/2023    Stevie JUDGE M.D. personally performed the services described in this documentation, as scribed by Barak Britt in my presence, and it is both accurate and complete.      The note accurately reflects work and decisions made by me.  Stevie De Paz M.D.  1/6/2023  6:48 PM

## 2023-01-07 NOTE — ASSESSMENT & PLAN NOTE
Culture from 12/25 growing ESBL.  Patient failed ciprofloxacin/doxycycline as an outpatient.  Admitted and started on IV Merrem.  Ucx prelim still is growing lactose fermenting gram (-) leilani     ID consult appreciated - c/w IV merrem and follow sensitivities; oral Abx (microbid) may be an option if sensitive.

## 2023-01-07 NOTE — CONSULTS
UROLOGY Consult Note:    YARELY Sutherland  Date & Time note created:    1/7/2023   10:45 AM         Patient ID:   Name:             Uday Osman   YOB: 1950  Age:                 72 y.o.  male   MRN:               [id number]                                                             Reason for Consult:      Urinary retention, h/o TURP, UTI    History of Present Illness:    This is a 73 yo Male who is known to [facility] has he has a history of BPH, and is s/p TURP with Dr. Higgins on 12/06/2022.  He presented to the ED with persistent fevers, chills and a recent culture that returned ESBL positive.  Additionally, he was having difficulty urinating. During workup, he was started on IV abx. A foster catheter was placed, and is draining well.  His creatinine is 1.09. Since being started on abx, and having a foster catheter placed, he reports that he overall is feeling better. Denies suprapubic tenderness, hematuria. Denies fevers, chills, nausea, vomiting.     Review of Systems:      Constitutional: See HPI  Eyes: Denies changes in vision, no eye pain  Ears/Nose/Throat/Mouth: Denies nasal congestion or sore throat   Cardiovascular: no chest pain, no palpitations   Respiratory: no shortness of breath , Denies cough  Gastrointestinal/Hepatic: See HPI  Genitourinary: See HPI  Musculoskeletal/Rheum: Denies  joint pain and swelling, no edema  Skin: Denies rash  Neurological: Denies headache, confusion, memory loss or focal weakness/parasthesias  Psychiatric: denies mood disorder   Endocrine: Tata thyroid problems  Heme/Oncology/Lymph Nodes: Denies enlarged lymph nodes, denies brusing or known bleeding disorder  All other systems were reviewed and are negative (AMA/CMS criteria)                Past Medical History:   Past Medical History:   Diagnosis Date    High cholesterol     Snoring     Urinary bladder disorder March 2020    Urinary Retention    UTI due to extended-spectrum beta lactamase  (ESBL) producing Escherichia coli 1/6/2023     Active Hospital Problems    Diagnosis     UTI due to extended-spectrum beta lactamase (ESBL) producing Escherichia coli [N39.0, B96.29, Z16.12]     Urine retention [R33.9]        Past Surgical History:  Past Surgical History:   Procedure Laterality Date    MA TRANSURETHRAL ELEC-SURG PROSTATECTOM  12/6/2022    Procedure: BIPOLAR TRANSURETHRAL RESECTION OF PROSTATE;  Surgeon: Phillip Higgins M.D.;  Location: SURGERY [facility];  Service: Urology    VENTRAL HERNIA REPAIR  04/22/2015    Performed by Sánchez Mayorga M.D. at SURGERY [facility]S    COLONOSCOPY  01/01/2013    X 2    OTHER ORTHOPEDIC SURGERY      fx finger/ age 15       Hospital Medications:    Current Facility-Administered Medications:     ascorbic acid (Vitamin C) tablet 1,500 mg, 1,500 mg, Oral, DAILY, Alton Kam M.D., 1,500 mg at 01/07/23 0834    atorvastatin (LIPITOR) tablet 40 mg, 40 mg, Oral, DAILY, Alton Kam M.D., 40 mg at 01/07/23 0834    tamsulosin (FLOMAX) capsule 0.4 mg, 0.4 mg, Oral, AFTER BREAKFAST, Alton Kam M.D.    meropenem (Merrem) 500 mg in  mL IV-MBP, 500 mg, Intravenous, Q8HRS, Alton Kam M.D., Stopped at 01/07/23 0918    senna-docusate (PERICOLACE or SENOKOT S) 8.6-50 MG per tablet 2 Tablet, 2 Tablet, Oral, BID **AND** polyethylene glycol/lytes (MIRALAX) PACKET 1 Packet, 1 Packet, Oral, QDAY PRN **AND** magnesium hydroxide (MILK OF MAGNESIA) suspension 30 mL, 30 mL, Oral, QDAY PRN **AND** bisacodyl (DULCOLAX) suppository 10 mg, 10 mg, Rectal, QDAY PRN, Johnny Rueda D.O.    enoxaparin (Lovenox) inj 40 mg, 40 mg, Subcutaneous, DAILY AT 1800, QUINN AcevedoO., 40 mg at 01/06/23 2134    acetaminophen (Tylenol) tablet 650 mg, 650 mg, Oral, Q6HRS PRN, Johnny Rueda D.O., 650 mg at 01/07/23 0833    labetalol (NORMODYNE/TRANDATE) injection 10 mg, 10 mg, Intravenous, Q4HRS PRN, Johnny Rueda D.O.    ondansetron (ZOFRAN) syringe/vial injection 4 mg, 4 mg, Intravenous,  "Q4HRS PRN, QUINN AcevedoO.    ondansetron (ZOFRAN ODT) dispertab 4 mg, 4 mg, Oral, Q4HRS PRN, QUINN AcevedoO.    Current Outpatient Medications:  Medications Prior to Admission   Medication Sig Dispense Refill Last Dose    tamsulosin (FLOMAX) 0.4 MG capsule Take 0.4 mg by mouth 1/2 hour after breakfast.   1/2/2023 at DISCONTINUED    Omega-3 Fatty Acids (FISH OIL) 1000 MG Cap capsule Take 1,000 mg by mouth every day.   1/5/2023 at 1900    Cholecalciferol (VITAMIN D3 PO) Take 1 Tablet by mouth every day.   1/5/2023 at 1900    ciprofloxacin (CIPRO) 500 MG Tab Take 500 mg by mouth 2 times a day. 7 day course started 12/25/2022 12/28/2022 at DISCONTINUED    sulfamethoxazole-trimethoprim (BACTRIM DS) 800-160 MG tablet Take 1 Tablet by mouth 2 times a day. 7 day course started 12/3/2022   12/9/2022 at COMPLETED    doxycycline (MONODOX) 100 MG capsule Take 100 mg by mouth 2 times a day. 10 day course started 12/28/2022 1/6/2023 at 0900    docusate sodium (COLACE) 100 MG Cap Take 100 mg by mouth 2 times a day as needed. 30 Capsule 0 \"FEW DAYS AGO\" at PRN    atorvastatin (LIPITOR) 40 MG Tab TAKE 1 TABLET BY MOUTH EVERY  Tablet 3 1/5/2023 at 1900    psyllium (METAMUCIL) 58.12 % Pack Take 1 Packet by mouth every day.   1/4/2023 at AM    cetirizine (ZYRTEC) 10 MG Tab Take 10 mg by mouth every day.   1/6/2023 at 0900    ascorbic acid (ASCORBIC ACID) 500 MG TABS Take 1,500 mg by mouth every day.   1/5/2023 at 1900       Medication Allergy:  No Known Allergies    Family History:  Family History   Problem Relation Age of Onset    Glaucoma Mother     Diabetes Father     Heart Disease Father     Hypertension Father     Hyperlipidemia Father     Stroke Father        Social History:  Social History     Socioeconomic History    Marital status:      Spouse name: Not on file    Number of children: Not on file    Years of education: Not on file    Highest education level: Associate degree: occupational, " technical, or vocational program   Occupational History    Not on file   Tobacco Use    Smoking status: Former     Packs/day: 1.00     Years: 25.00     Pack years: 25.00     Types: Cigarettes     Start date: 10/1/1964     Quit date: 1990     Years since quittin.0    Smokeless tobacco: Never   Vaping Use    Vaping Use: Never used   Substance and Sexual Activity    Alcohol use: Yes     Comment: very occasional    Drug use: Yes     Types: Marijuana     Comment: occasional Cannabis edible rarely    Sexual activity: Not Currently   Other Topics Concern    Not on file   Social History Narrative    Not on file     Social Determinants of Health     Financial Resource Strain: Low Risk     Difficulty of Paying Living Expenses: Not hard at all   Food Insecurity: No Food Insecurity    Worried About Running Out of Food in the Last Year: Never true    Ran Out of Food in the Last Year: Never true   Transportation Needs: No Transportation Needs    Lack of Transportation (Medical): No    Lack of Transportation (Non-Medical): No   Physical Activity: Sufficiently Active    Days of Exercise per Week: 3 days    Minutes of Exercise per Session: 50 min   Stress: No Stress Concern Present    Feeling of Stress : Not at all   Social Connections: Moderately Integrated    Frequency of Communication with Friends and Family: More than three times a week    Frequency of Social Gatherings with Friends and Family: More than three times a week    Attends [employer] Services: Never    Active Member of Clubs or Organizations: Yes    Attends Club or Organization Meetings: More than 4 times per year    Marital Status:    Intimate Partner Violence: Not At Risk    Fear of Current or Ex-Partner: No    Emotionally Abused: No    Physically Abused: No    Sexually Abused: No   Housing Stability: Low Risk     Unable to Pay for Housing in the Last Year: No    Number of Places Lived in the Last Year: 1    Unstable Housing in the Last Year: No  "        Physical Exam:  Vitals/ General Appearance:   Weight/BMI: Body mass index is 30.37 kg/m².  /70   Pulse 76   Temp 37.3 °C (99.2 °F) (Temporal)   Resp 18   Ht 1.727 m (5' 8\")   Wt 90.6 kg (199 lb 11.8 oz)   SpO2 94%   Vitals:    01/06/23 2001 01/06/23 2221 01/07/23 0416 01/07/23 0746   BP: (!) 158/72 (!) 159/77 137/63 135/70   Pulse: 93 99 80 76   Resp:  18 18 18   Temp:  36.9 °C (98.4 °F) 37.5 °C (99.5 °F) 37.3 °C (99.2 °F)   TempSrc:  Temporal Temporal Temporal   SpO2: 95% 95% 93% 94%   Weight:  90.6 kg (199 lb 11.8 oz)     Height:  1.727 m (5' 8\")       Oxygen Therapy:  Pulse Oximetry: 94 %, O2 (LPM): 0, O2 Delivery Device: None - Room Air    Constitutional:   No acute distress  HENMT:  Normocephalic, Atraumatic  Neck:  Normal range of motion  Lungs:  Normal respiratory effort.   Abdomen: Soft, No tenderness, No guarding, No rebound tenderness.  : No CVAT. Javed in place with clear yellow output.  Skin: Warm, Dry.  Neurologic: Alert & oriented x 3  Psychiatric: Affect normal, Judgment normal, Mood normal.      MDM (Data Review):     Records reviewed and summarized in current documentation    Lab Data Review:  Recent Results (from the past 24 hour(s))   URINALYSIS,CULTURE IF INDICATED    Collection Time: 01/06/23  4:36 PM    Specimen: Urine, Clean Catch   Result Value Ref Range    Color Yellow     Character Turbid (A)     Specific Gravity 1.011 <1.035    Ph 6.0 5.0 - 8.0    Glucose Negative Negative mg/dL    Ketones Negative Negative mg/dL    Protein 100 (A) Negative mg/dL    Bilirubin Negative Negative    Urobilinogen, Urine 0.2 Negative    Nitrite Positive (A) Negative    Leukocyte Esterase Large (A) Negative    Occult Blood Moderate (A) Negative    Micro Urine Req Microscopic    URINE MICROSCOPIC (W/UA)    Collection Time: 01/06/23  4:36 PM   Result Value Ref Range    WBC Packed (A) /hpf    RBC 10-20 (A) /hpf    Bacteria Moderate (A) None /hpf    Epithelial Cells Few /hpf    Hyaline Cast 0-2 " /lpf   CBC With Differential    Collection Time: 01/06/23  5:54 PM   Result Value Ref Range    WBC 9.1 4.8 - 10.8 K/uL    RBC 4.23 (L) 4.70 - 6.10 M/uL    Hemoglobin 13.0 (L) 14.0 - 18.0 g/dL    Hematocrit 39.0 (L) 42.0 - 52.0 %    MCV 92.2 81.4 - 97.8 fL    MCH 30.7 27.0 - 33.0 pg    MCHC 33.3 (L) 33.7 - 35.3 g/dL    RDW 44.6 35.9 - 50.0 fL    Platelet Count 314 164 - 446 K/uL    MPV 8.8 (L) 9.0 - 12.9 fL    Neutrophils-Polys 70.80 44.00 - 72.00 %    Lymphocytes 15.30 (L) 22.00 - 41.00 %    Monocytes 10.50 0.00 - 13.40 %    Eosinophils 1.70 0.00 - 6.90 %    Basophils 0.80 0.00 - 1.80 %    Immature Granulocytes 0.90 0.00 - 0.90 %    Nucleated RBC 0.00 /100 WBC    Neutrophils (Absolute) 6.45 1.82 - 7.42 K/uL    Lymphs (Absolute) 1.39 1.00 - 4.80 K/uL    Monos (Absolute) 0.95 (H) 0.00 - 0.85 K/uL    Eos (Absolute) 0.15 0.00 - 0.51 K/uL    Baso (Absolute) 0.07 0.00 - 0.12 K/uL    Immature Granulocytes (abs) 0.08 0.00 - 0.11 K/uL    NRBC (Absolute) 0.00 K/uL   Comp Metabolic Panel    Collection Time: 01/06/23  5:54 PM   Result Value Ref Range    Sodium 139 135 - 145 mmol/L    Potassium 3.8 3.6 - 5.5 mmol/L    Chloride 106 96 - 112 mmol/L    Co2 25 20 - 33 mmol/L    Anion Gap 8.0 7.0 - 16.0    Glucose 92 65 - 99 mg/dL    Bun 12 8 - 22 mg/dL    Creatinine 1.21 0.50 - 1.40 mg/dL    Calcium 9.0 8.5 - 10.5 mg/dL    AST(SGOT) 25 12 - 45 U/L    ALT(SGPT) 23 2 - 50 U/L    Alkaline Phosphatase 198 (H) 30 - 99 U/L    Total Bilirubin 0.4 0.1 - 1.5 mg/dL    Albumin 3.7 3.2 - 4.9 g/dL    Total Protein 7.2 6.0 - 8.2 g/dL    Globulin 3.5 1.9 - 3.5 g/dL    A-G Ratio 1.1 g/dL   Lactic Acid    Collection Time: 01/06/23  5:54 PM   Result Value Ref Range    Lactic Acid 1.1 0.5 - 2.0 mmol/L   Blood Culture    Collection Time: 01/06/23  5:54 PM    Specimen: Peripheral; Blood   Result Value Ref Range    Significant Indicator NEG     Source BLD     Site PERIPHERAL     Culture Result       No Growth  Note: Blood cultures are incubated for 5  days and  are monitored continuously.Positive blood cultures  are called to the RN and reported as soon as  they are identified.     CORRECTED CALCIUM    Collection Time: 01/06/23  5:54 PM   Result Value Ref Range    Correct Calcium 9.2 8.5 - 10.5 mg/dL   ESTIMATED GFR    Collection Time: 01/06/23  5:54 PM   Result Value Ref Range    GFR (CKD-EPI) 64 >60 mL/min/1.73 m 2   Blood Culture    Collection Time: 01/06/23  7:11 PM    Specimen: Peripheral; Blood   Result Value Ref Range    Significant Indicator NEG     Source BLD     Site PERIPHERAL     Culture Result       No Growth  Note: Blood cultures are incubated for 5 days and  are monitored continuously.Positive blood cultures  are called to the RN and reported as soon as  they are identified.     Basic Metabolic Panel (BMP)    Collection Time: 01/07/23  1:05 AM   Result Value Ref Range    Sodium 139 135 - 145 mmol/L    Potassium 3.9 3.6 - 5.5 mmol/L    Chloride 106 96 - 112 mmol/L    Co2 26 20 - 33 mmol/L    Glucose 134 (H) 65 - 99 mg/dL    Bun 12 8 - 22 mg/dL    Creatinine 1.09 0.50 - 1.40 mg/dL    Calcium 8.8 8.5 - 10.5 mg/dL    Anion Gap 7.0 7.0 - 16.0   CBC without Differential    Collection Time: 01/07/23  1:05 AM   Result Value Ref Range    WBC 9.2 4.8 - 10.8 K/uL    RBC 3.78 (L) 4.70 - 6.10 M/uL    Hemoglobin 11.7 (L) 14.0 - 18.0 g/dL    Hematocrit 35.1 (L) 42.0 - 52.0 %    MCV 92.9 81.4 - 97.8 fL    MCH 31.0 27.0 - 33.0 pg    MCHC 33.3 (L) 33.7 - 35.3 g/dL    RDW 45.1 35.9 - 50.0 fL    Platelet Count 291 164 - 446 K/uL    MPV 9.1 9.0 - 12.9 fL   ESTIMATED GFR    Collection Time: 01/07/23  1:05 AM   Result Value Ref Range    GFR (CKD-EPI) 72 >60 mL/min/1.73 m 2       Imaging/Procedures Review:    Reviewed    MDM (Assessment and Plan):     Active Hospital Problems    Diagnosis     UTI due to extended-spectrum beta lactamase (ESBL) producing Escherichia coli [N39.0, B96.29, Z16.12]     Urine retention [R33.9]      73 yo Male with a history of BPH, and is s/p  TURP with Dr. Higgins on 12/06/2022. Now presenting with fevers, chills, UTI with some improvement on abx, with foster catheter placed.  Plan:  - continue foster catheter  - continue abx  - recommend foster catheter for 5 days.  [facility] will arrange outpatient follow up for a voiding trial.  -no further acute urologic intervention. Urology signing off. Please reach out with additional questions and concerns.    Dr. Last is aware of this consult and the direction of the plan of care.

## 2023-01-08 VITALS
TEMPERATURE: 99 F | HEART RATE: 77 BPM | DIASTOLIC BLOOD PRESSURE: 79 MMHG | BODY MASS INDEX: 30.27 KG/M2 | WEIGHT: 199.74 LBS | SYSTOLIC BLOOD PRESSURE: 141 MMHG | OXYGEN SATURATION: 93 % | HEIGHT: 68 IN | RESPIRATION RATE: 16 BRPM

## 2023-01-08 PROCEDURE — A9270 NON-COVERED ITEM OR SERVICE: HCPCS | Performed by: STUDENT IN AN ORGANIZED HEALTH CARE EDUCATION/TRAINING PROGRAM

## 2023-01-08 PROCEDURE — 99239 HOSP IP/OBS DSCHRG MGMT >30: CPT | Performed by: STUDENT IN AN ORGANIZED HEALTH CARE EDUCATION/TRAINING PROGRAM

## 2023-01-08 PROCEDURE — 700102 HCHG RX REV CODE 250 W/ 637 OVERRIDE(OP): Performed by: STUDENT IN AN ORGANIZED HEALTH CARE EDUCATION/TRAINING PROGRAM

## 2023-01-08 PROCEDURE — 700105 HCHG RX REV CODE 258: Performed by: INTERNAL MEDICINE

## 2023-01-08 PROCEDURE — 700111 HCHG RX REV CODE 636 W/ 250 OVERRIDE (IP): Performed by: INTERNAL MEDICINE

## 2023-01-08 RX ADMIN — MEROPENEM 500 MG: 500 INJECTION, POWDER, FOR SOLUTION INTRAVENOUS at 00:47

## 2023-01-08 RX ADMIN — ACETAMINOPHEN 650 MG: 325 TABLET ORAL at 02:34

## 2023-01-08 RX ADMIN — OXYCODONE HYDROCHLORIDE AND ACETAMINOPHEN 1500 MG: 500 TABLET ORAL at 05:28

## 2023-01-08 RX ADMIN — ATORVASTATIN CALCIUM 40 MG: 40 TABLET, FILM COATED ORAL at 05:28

## 2023-01-08 RX ADMIN — MEROPENEM 500 MG: 500 INJECTION, POWDER, FOR SOLUTION INTRAVENOUS at 05:31

## 2023-01-08 RX ADMIN — ACETAMINOPHEN 650 MG: 325 TABLET ORAL at 09:25

## 2023-01-08 RX ADMIN — DOCUSATE SODIUM 50 MG AND SENNOSIDES 8.6 MG 2 TABLET: 8.6; 5 TABLET, FILM COATED ORAL at 05:28

## 2023-01-08 ASSESSMENT — PAIN DESCRIPTION - PAIN TYPE: TYPE: ACUTE PAIN

## 2023-01-08 NOTE — PROGRESS NOTES
Patient being discharged. Pt educated on discharge instructions and new prescriptions, verbalized understanding. Follow up appointment made with urology. PIV removed, monitor checked in. Driving self home, pt ambulatory, Aox4, steady, RN walked with pt to garage. Foster in place, pt received foster leg bag and instructions.

## 2023-01-08 NOTE — DISCHARGE SUMMARY
Discharge Summary    CHIEF COMPLAINT ON ADMISSION  Chief Complaint   Patient presents with    Post-Op Complications     Pt recently had a TURP on December 6th and had been prescribed antibiotics. Pt reports he just finished his medications today and feels as he has a UTI with fever, body aches and chills. Pt did have a recent UTI on yvonne day and was prescribed doxycyline.     Fever       Reason for Admission  Suspected ESBL UTI    Admission Date  1/6/2023    CODE STATUS  Full Code    HPI & HOSPITAL COURSE  73 y/o M with a history of TURP (12/6/2022; follows with Dr. Garcia) and HLD presented with a concern of ongoing ESBL UTI. He presented with persistent fevers, chills, body ache, urinary frequent and at times, difficult urinating. Patient reported that he has had symptoms for several weeks.  He reports that he had a TURP procedure performed on December 6.  He was found to have a UTI after a checkup on 12/25. Cultures returned positive for ESBL.  He was on ciprofloxacin initially and then doxycycline (for 10 days after noting ESBL) as an outpatient.      In ED, workups were concerning for recurrent/ongoing/incomplete treatment of ESBL UTI - Pt was started on Merrem and then referred to Hospital medicine for further management. Foster catheter was inserted as he was having a difficulty urinating.      1/7: afebrile and vitals wnl; pain is adequately controlled. Urology consult appreciated - recommend to DC with foster and outpatient follow up. Ucx prelim still is growing lactose fermenting gram (-) leilani; hence, ID was consulted - c/w IV merrem and follow sensitivities; oral Abx may be an option if sensitive. With pending culture sensitivities, the patient was continued on Merrem.     1/8 today: no acute complain from patient. Vitals wnl and afebrile. Ucx is growing ESBL and sensitivities reviewed (essentially unchanged from prior). Hence, at this point, plan is to DC with 7 days of Microbid. The patient will  follow up with Urology in a few days.        Therefore, he is discharged in fair and stable condition to home with close outpatient follow-up.    The patient met 2-midnight criteria for an inpatient stay at the time of discharge.    Discharge Date  01/08/23    FOLLOW UP ITEMS POST DISCHARGE  N/A    DISCHARGE DIAGNOSES  Principal Problem:    UTI due to extended-spectrum beta lactamase (ESBL) producing Escherichia coli POA: Yes  Active Problems:    Mixed hyperlipidemia POA: Yes    Urine retention POA: Yes  Resolved Problems:    * No resolved hospital problems. *      FOLLOW UP  Please follow up with your primary care physician within 1 to 2 weeks of discharge. [facility] if available.      MEDICATIONS ON DISCHARGE     Medication List        START taking these medications        Instructions   nitrofurantoin 100 MG Caps  Commonly known as: Macrobid   Take 1 Capsule by mouth 2 times a day for 7 days.  Dose: 100 mg            CONTINUE taking these medications        Instructions   ascorbic acid 500 MG Tabs  Commonly known as: ascorbic acid   Take 1,500 mg by mouth every day.  Dose: 1,500 mg     atorvastatin 40 MG Tabs  Commonly known as: LIPITOR   TAKE 1 TABLET BY MOUTH EVERY DAY  Dose: 40 mg     cetirizine 10 MG Tabs  Commonly known as: ZYRTEC   Take 10 mg by mouth every day.  Dose: 10 mg     docusate sodium 100 MG Caps  Commonly known as: COLACE   Doctor's comments: Please use to prevent constipation.  If having loose stools, this can be held.  Take 100 mg by mouth 2 times a day as needed.  Dose: 100 mg     fish oil 1000 MG Caps capsule   Take 1,000 mg by mouth every day.  Dose: 1,000 mg     psyllium 58.12 % Pack  Commonly known as: METAMUCIL   Take 1 Packet by mouth every day.  Dose: 1 Packet     tamsulosin 0.4 MG capsule  Commonly known as: FLOMAX   Take 0.4 mg by mouth 1/2 hour after breakfast.  Dose: 0.4 mg     VITAMIN D3 PO   Take 1 Tablet by mouth every day.  Dose: 1 Tablet            STOP taking these  medications      ciprofloxacin 500 MG Tabs  Commonly known as: CIPRO     doxycycline 100 MG capsule  Commonly known as: MONODOX     sulfamethoxazole-trimethoprim 800-160 MG tablet  Commonly known as: BACTRIM DS              Allergies  No Known Allergies    DIET  Orders Placed This Encounter   Procedures    Diet Order Diet: Regular     Standing Status:   Standing     Number of Occurrences:   1     Order Specific Question:   Diet:     Answer:   Regular [1]       ACTIVITY  As tolerated.  Weight bearing as tolerated    CONSULTATIONS  Urology  ID    PROCEDURES  N/A    LABORATORY  Lab Results   Component Value Date    SODIUM 139 01/07/2023    POTASSIUM 3.9 01/07/2023    CHLORIDE 106 01/07/2023    CO2 26 01/07/2023    GLUCOSE 134 (H) 01/07/2023    BUN 12 01/07/2023    CREATININE 1.09 01/07/2023        Lab Results   Component Value Date    WBC 9.2 01/07/2023    HEMOGLOBIN 11.7 (L) 01/07/2023    HEMATOCRIT 35.1 (L) 01/07/2023    PLATELETCT 291 01/07/2023        Total time of the discharge process exceeds 36 minutes.

## 2023-01-08 NOTE — CARE PLAN
The patient is Stable - Low risk of patient condition declining or worsening    Shift Goals  Clinical Goals: urology consult, dc foster  Patient Goals: discharge home  Family Goals: JEREMIAS    Progress made toward(s) clinical / shift goals:    Problem: Knowledge Deficit - Standard  Goal: Patient and family/care givers will demonstrate understanding of plan of care, disease process/condition, diagnostic tests and medications  Outcome: Progressing     Problem: Pain - Standard  Goal: Alleviation of pain or a reduction in pain to the patient’s comfort goal  Outcome: Progressing       Patient is not progressing towards the following goals:

## 2023-01-08 NOTE — CARE PLAN
The patient is Stable - Low risk of patient condition declining or worsening    Shift Goals  Clinical Goals: urine culture results  Patient Goals: home  Family Goals: JEREMIAS    Progress made toward(s) clinical / shift goals:  pt to dc home with foster and po abx    Patient is not progressing towards the following goals:      Problem: Knowledge Deficit - Standard  Goal: Patient and family/care givers will demonstrate understanding of plan of care, disease process/condition, diagnostic tests and medications  Description: Target End Date:  1-3 days or as soon as patient condition allows    Document in Patient Education    1.  Patient and family/caregiver oriented to unit, equipment, visitation policy and means for communicating concern  2.  Complete/review Learning Assessment  3.  Assess knowledge level of disease process/condition, treatment plan, diagnostic tests and medications  4.  Explain disease process/condition, treatment plan, diagnostic tests and medications  Outcome: Met     Problem: Pain - Standard  Goal: Alleviation of pain or a reduction in pain to the patient’s comfort goal  Description: Target End Date:  Prior to discharge or change in level of care    Document on Vitals flowsheet    1.  Document pain using the appropriate pain scale per order or unit policy  2.  Educate and implement non-pharmacologic comfort measures (i.e. relaxation, distraction, massage, cold/heat therapy, etc.)  3.  Pain management medications as ordered  4.  Reassess pain after pain med administration per policy  5.  If opiods administered assess patient's response to pain medication is appropriate per POSS sedation scale  6.  Follow pain management plan developed in collaboration with patient and interdisciplinary team (including palliative care or pain specialists if applicable)  Outcome: Met

## 2023-01-09 ENCOUNTER — PATIENT OUTREACH (OUTPATIENT)
Dept: MEDICAL GROUP | Facility: MEDICAL CENTER | Age: 73
End: 2023-01-09
Payer: MEDICARE

## 2023-01-09 NOTE — PROGRESS NOTES
TCM complete.  Patient wishes to make FU appointment with PCP on his own.  He was informed that it should be scheduled within 14 days - by 1/20.  He has a FU appointment with his urologist on  1/11.

## 2023-01-11 LAB
BACTERIA BLD CULT: NORMAL
BACTERIA BLD CULT: NORMAL
SIGNIFICANT IND 70042: NORMAL
SIGNIFICANT IND 70042: NORMAL
SITE SITE: NORMAL
SITE SITE: NORMAL
SOURCE SOURCE: NORMAL
SOURCE SOURCE: NORMAL

## 2023-01-12 ENCOUNTER — OFFICE VISIT (OUTPATIENT)
Dept: MEDICAL GROUP | Facility: MEDICAL CENTER | Age: 73
End: 2023-01-12
Payer: MEDICARE

## 2023-01-12 VITALS
OXYGEN SATURATION: 96 % | HEART RATE: 90 BPM | RESPIRATION RATE: 16 BRPM | WEIGHT: 200.62 LBS | DIASTOLIC BLOOD PRESSURE: 72 MMHG | SYSTOLIC BLOOD PRESSURE: 126 MMHG | TEMPERATURE: 97.3 F | BODY MASS INDEX: 29.71 KG/M2 | HEIGHT: 69 IN

## 2023-01-12 DIAGNOSIS — N39.0 UTI DUE TO EXTENDED-SPECTRUM BETA LACTAMASE (ESBL) PRODUCING ESCHERICHIA COLI: ICD-10-CM

## 2023-01-12 DIAGNOSIS — Z16.12 UTI DUE TO EXTENDED-SPECTRUM BETA LACTAMASE (ESBL) PRODUCING ESCHERICHIA COLI: ICD-10-CM

## 2023-01-12 DIAGNOSIS — R74.8 ELEVATED ALKALINE PHOSPHATASE LEVEL: ICD-10-CM

## 2023-01-12 DIAGNOSIS — B96.29 UTI DUE TO EXTENDED-SPECTRUM BETA LACTAMASE (ESBL) PRODUCING ESCHERICHIA COLI: ICD-10-CM

## 2023-01-12 DIAGNOSIS — D64.9 ANEMIA, UNSPECIFIED TYPE: ICD-10-CM

## 2023-01-12 PROCEDURE — 99214 OFFICE O/P EST MOD 30 MIN: CPT | Performed by: FAMILY MEDICINE

## 2023-01-12 ASSESSMENT — FIBROSIS 4 INDEX: FIB4 SCORE: 1.27

## 2023-01-12 NOTE — ASSESSMENT & PLAN NOTE
Complicated by recent TURP and urinary retention   He has been struggling to clear this infection for some time now and when he presented to my office last week I directed him to ER and also called and inolved on call urology   He has catheter placed and has follow up appt with urology today to evaluate this catheter     I encourage him to attend this urology appointment today   I also encourage him to SCHEDULE FOLLOW UP APPOINMTMENT WITH UROLOGY PROVIDER after todays visit with urology so that they can continue to stay involved/manage this urologic issue.  I explain that most offices including [facility] do not have the capacity to offer good quality care over the phone or over the patient portal but rather scheduled appointments with the provider allows the provider to focus and concentrate on his case and offer good quality care.      30+ min spent

## 2023-01-12 NOTE — PROGRESS NOTES
This medical record contains text that has been entered with the assistance of computer voice recognition and dictation software.  Therefore, it may contain unintended errors in text, spelling, punctuation, or grammar        Chief Complaint   Patient presents with    Follow-Up     Urologic problems - pt interested in lab order for a urine test       Uday Osman is a 72 y.o. male here evaluation and management of:         Current Outpatient Medications   Medication Sig Dispense Refill    nitrofurantoin (MACROBID) 100 MG Cap Take 1 Capsule by mouth 2 times a day for 7 days. 14 Capsule 0    Omega-3 Fatty Acids (FISH OIL) 1000 MG Cap capsule Take 1,000 mg by mouth every day.      Cholecalciferol (VITAMIN D3 PO) Take 1 Tablet by mouth every day.      docusate sodium (COLACE) 100 MG Cap Take 100 mg by mouth 2 times a day as needed. 30 Capsule 0    atorvastatin (LIPITOR) 40 MG Tab TAKE 1 TABLET BY MOUTH EVERY  Tablet 3    psyllium (METAMUCIL) 58.12 % Pack Take 1 Packet by mouth every day.      cetirizine (ZYRTEC) 10 MG Tab Take 10 mg by mouth every day.      ascorbic acid (ASCORBIC ACID) 500 MG TABS Take 1,500 mg by mouth every day.      tamsulosin (FLOMAX) 0.4 MG capsule Take 0.4 mg by mouth 1/2 hour after breakfast. (Patient not taking: Reported on 1/12/2023)       No current facility-administered medications for this visit.     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    Left anterior knee pain 07/28/2021    Effects of high altitude 06/17/2021    Elevated blood pressure reading 06/17/2021    Medicare annual wellness visit, subsequent 06/17/2021    History of fever  "2020    Mixed hyperlipidemia 2018    Annual physical exam 2018    Diverticulosis of large intestine without hemorrhage 2018    Ventral hernia 2015     Past Surgical History:   Procedure Laterality Date    [facility] TRANSURETHRAL ELEC-SURG PROSTATECTOM  2022    Procedure: BIPOLAR TRANSURETHRAL RESECTION OF PROSTATE;  Surgeon: Phillip Higgins M.D.;  Location: SURGERY [facility];  Service: Urology    VENTRAL HERNIA REPAIR  2015    Performed by Sánchez Mayorga M.D. at SURGERY [facility]S    COLONOSCOPY  01/01/2013    X 2    OTHER ORTHOPEDIC SURGERY      fx finger/ age 15      Social History     Tobacco Use    Smoking status: Former     Packs/day: 1.00     Years: 25.00     Pack years: 25.00     Types: Cigarettes     Start date: 10/1/1964     Quit date: 1990     Years since quittin.0    Smokeless tobacco: Never   Vaping Use    Vaping Use: Never used   Substance Use Topics    Alcohol use: Yes     Comment: very occasional    Drug use: Yes     Types: Marijuana     Comment: occasional Cannabis edible rarely     Family History   Problem Relation Age of Onset    Glaucoma Mother     Diabetes Father     Heart Disease Father     Hypertension Father     Hyperlipidemia Father     Stroke Father            ROS    all review of system completed and negative except for those listed above     Objective:     /72 (BP Location: Right arm, Patient Position: Sitting, BP Cuff Size: Adult long)   Pulse 90   Temp 36.3 °C (97.3 °F) (Temporal)   Resp 16   Ht 1.74 m (5' 8.5\")   Wt 91 kg (200 lb 9.9 oz)   SpO2 96%  Body mass index is 30.06 kg/m².  Physical Exam:        GEN: comfortable, alert and oriented, well nourished, well developed, in no apparent distress   HEENT: NCAT, eyes: pupils equal and reactive, sclera white, EOMIT, good dentition  HEART: limbs warm and well perfused, regular rate, no JVD, no lower extremity edema  LUNGS: speaking in full sentences, not in apparent " respiratory distress, no audible wheezes  MSK: normal tone and bulk, no swelling of the joints, gait steady and normal         Assessment and Plan:   The following treatment plan was discussed        Problem List Items Addressed This Visit       UTI due to extended-spectrum beta lactamase (ESBL) producing Escherichia coli     Complicated by recent TURP and urinary retention   He has been struggling to clear this infection for some time now and when he presented to my office last week I directed him to ER and also called and inolved on call urology   He has catheter placed and has follow up appt with urology today to evaluate this catheter     I encourage him to attend this urology appointment today   I also encourage him to SCHEDULE FOLLOW UP APPOINMTMENT WITH UROLOGY PROVIDER after todays visit with urology so that they can continue to stay involved/manage this urologic issue.  I explain that most offices including [facility] [place] do not have the capacity to offer good quality care over the phone or over the patient portal but rather scheduled appointments with the provider allows the provider to focus and concentrate on his case and offer good quality care.      30+ min spent               Other Visit Diagnoses       Elevated alkaline phosphatase level        Relevant Orders    Comp Metabolic Panel    GAMMA GT (GGT)    Anemia, unspecified type        Relevant Orders    HEMOGLOBIN AND HEMATOCRIT    IRON/TOTAL IRON BIND    OCCULT BLOOD FECES IMMUNOASSAY                  Instructed to follow up if symptoms worsen or fail to improve, ER/UC precautions discussed as well    Jesi Najera MD  [facility], Family Medicine   [address] [place] [address] [address]   [place] [id number]  Phone: [phone]

## 2023-01-17 ENCOUNTER — HOSPITAL ENCOUNTER (OUTPATIENT)
Facility: MEDICAL CENTER | Age: 73
End: 2023-01-17
Attending: NURSE PRACTITIONER
Payer: MEDICARE

## 2023-01-17 ENCOUNTER — OFFICE VISIT (OUTPATIENT)
Dept: URGENT CARE | Facility: CLINIC | Age: 73
End: 2023-01-17
Payer: MEDICARE

## 2023-01-17 DIAGNOSIS — Z87.440 RECENT URINARY TRACT INFECTION: ICD-10-CM

## 2023-01-17 LAB
APPEARANCE UR: CLEAR
BILIRUB UR STRIP-MCNC: NEGATIVE MG/DL
COLOR UR AUTO: YELLOW
GLUCOSE UR STRIP.AUTO-MCNC: NEGATIVE MG/DL
KETONES UR STRIP.AUTO-MCNC: NEGATIVE MG/DL
LEUKOCYTE ESTERASE UR QL STRIP.AUTO: ABNORMAL
NITRITE UR QL STRIP.AUTO: NEGATIVE
PH UR STRIP.AUTO: 6.5 [PH] (ref 5–8)
PROT UR QL STRIP: 30 MG/DL
RBC UR QL AUTO: ABNORMAL
SP GR UR STRIP.AUTO: 1.02
UROBILINOGEN UR STRIP-MCNC: 0.2 MG/DL

## 2023-01-17 PROCEDURE — 99214 OFFICE O/P EST MOD 30 MIN: CPT | Performed by: NURSE PRACTITIONER

## 2023-01-17 PROCEDURE — 87086 URINE CULTURE/COLONY COUNT: CPT

## 2023-01-17 PROCEDURE — 81002 URINALYSIS NONAUTO W/O SCOPE: CPT | Performed by: NURSE PRACTITIONER

## 2023-01-17 ASSESSMENT — FIBROSIS 4 INDEX: FIB4 SCORE: 1.27

## 2023-01-17 NOTE — PROGRESS NOTES
"    Chief Complaint   Patient presents with    UTI     \"Weird feeling\"       HISTORY OF PRESENT ILLNESS: Patient is a pleasant 72 y.o. male who presents to urgent care today with request of urine culture.  The patient presented to urgent care 12/25/2022 for urinary symptoms.  Patient was treated for urinary tract infection with Cipro.  Urine culture returned positive for ESBL.  Patient symptoms worsened, was then hospitalized on 1/6/2023 for ESBL UTI.  He was treated with IV antibiotic therapy and discharged home with Macrobid for 1 week.  He completed the Macrobid 3 days ago.  He has a history of TURP on 12/6/2022, since then he has had an odd feeling in his urethra, that has since not changed.  Today he denies any dysuria, urgency, frequency, retention, flank pain, fever, nausea.  States that he is asymptomatic and would merely like a repeat culture.    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    Left anterior knee pain 07/28/2021    Effects of high altitude 06/17/2021    Elevated blood pressure reading 06/17/2021    Medicare annual wellness visit, subsequent 06/17/2021    History of fever 06/25/2020    Mixed hyperlipidemia 03/21/2018    Annual physical exam 03/21/2018    Diverticulosis of large intestine without hemorrhage 03/21/2018    Ventral hernia 04/22/2015       Allergies:Patient has no known allergies.    Current Outpatient Medications Ordered in Epic   Medication Sig Dispense Refill    Omega-3 Fatty Acids (FISH OIL) 1000 MG Cap capsule Take 1,000 mg by mouth every day.      Cholecalciferol (VITAMIN D3 PO) Take 1 Tablet by mouth every day.      docusate sodium (COLACE) " 100 MG Cap Take 100 mg by mouth 2 times a day as needed. 30 Capsule 0    atorvastatin (LIPITOR) 40 MG Tab TAKE 1 TABLET BY MOUTH EVERY  Tablet 3    psyllium (METAMUCIL) 58.12 % Pack Take 1 Packet by mouth every day.      cetirizine (ZYRTEC) 10 MG Tab Take 10 mg by mouth every day.      ascorbic acid (ASCORBIC ACID) 500 MG TABS Take 1,500 mg by mouth every day.       No current [facility]-ordered facility-administered medications on file.       Past Medical History:   Diagnosis Date    High cholesterol     Snoring     Urinary bladder disorder 2020    Urinary Retention    UTI due to extended-spectrum beta lactamase (ESBL) producing Escherichia coli 2023       Social History     Tobacco Use    Smoking status: Former     Packs/day: 1.00     Years: 25.00     Pack years: 25.00     Types: Cigarettes     Start date: 10/1/1964     Quit date: 1990     Years since quittin.0    Smokeless tobacco: Never   Vaping Use    Vaping Use: Never used   Substance Use Topics    Alcohol use: Yes     Comment: very occasional    Drug use: Yes     Types: Marijuana     Comment: occasional Cannabis edible rarely       Family Status   Relation Name Status    Omkar Osman (Not Specified)    Brown Jiménez Jacqui (Not Specified)     Family History   Problem Relation Age of Onset    Glaucoma Mother     Diabetes Father     Heart Disease Father     Hypertension Father     Hyperlipidemia Father     Stroke Father        ROS:  Review of Systems   Constitutional: Negative for fever, chills, weight loss, malaise, and fatigue.   HENT: Negative for ear pain, nosebleeds, congestion, sore throat and neck pain.    Eyes: Negative for vision changes.   Neuro: Negative for headache, sensory changes, weakness, seizure, LOC.   Cardiovascular: Negative for chest pain, palpitations, orthopnea and leg swelling.   Respiratory: Negative for cough, sputum production, shortness of breath and wheezing.   Gastrointestinal: Negative for abdominal pain,  "nausea, vomiting or diarrhea.   Genitourinary: Positive for odd sensation urethra.  Negative for dysuria, urgency and frequency.  Musculoskeletal: Negative for falls, neck pain, back pain, joint pain, myalgias.   Skin: Negative for rash, diaphoresis.     Exam:  BP (!) 132/94   Pulse 95   Temp 36.4 °C (97.6 °F) (Temporal)   Resp 14   Ht 1.727 m (5' 8\")   Wt 93 kg (205 lb)   SpO2 99%   General: well-nourished, well-developed male in NAD  Head: normocephalic, atraumatic  Eyes: PERRLA, no conjunctival injection, acuity grossly intact, lids normal.  Ears: normal shape and symmetry, no tenderness, no discharge. External canals are without any significant edema or erythema. Tympanic membranes are without any inflammation, no effusion. Gross auditory acuity is intact.  Nose: symmetrical without tenderness, no discharge.  Mouth/Throat: reasonable hygiene, no erythema, exudates or tonsillar enlargement.  Neck: no masses, range of motion within normal limits, no tracheal deviation. No obvious thyroid enlargement.   Lymph: no cervical adenopathy. No supraclavicular adenopathy.   Neuro: alert and oriented. Cranial nerves 1-12 grossly intact. No sensory deficit.   Cardiovascular: regular rate and rhythm. No edema.  Pulmonary: no distress. Chest is symmetrical with respiration, no wheezes, crackles, or rhonchi.   Abdomen: soft, non-tender, no guarding, no hepatosplenomegaly.  No CVA tenderness.  Musculoskeletal: no clubbing, appropriate muscle tone, gait is stable.  Skin: warm, dry, intact, no clubbing, no cyanosis, no rashes.   Psych: appropriate mood, affect, judgement.       POC urine positive for blood, protein, leukocytes      Assessment/Plan:  1. Recent urinary tract infection  URINE CULTURE(NEW)    POCT Urinalysis          Patient is a 72-year-old male who presents with concerns of recent UTI requiring hospitalization.  He states that he is asymptomatic today and would merely like a repeat urine culture.  Upon clinic " presentation the patient's heart rate is 120.  After sitting in exam room, heart rate auscultated at 95.  I have discussed his elevated heart rate with the patient.  He states that he is nervous about his recent infection and does not feel ill at all today.  Urine sent for culture, will notify and treat accordingly with any changes.  In the meantime, STRICT ER precautions advised, he is in agreement.  Supportive care, differential diagnoses, and indications for immediate follow-up discussed with patient.   Pathogenesis of diagnosis discussed including typical length and natural progression.   Instructed to return to clinic or nearest emergency department for any change in condition, further concerns, or worsening of symptoms.  Patient states understanding of the plan of care and discharge instructions.  Instructed to make an appointment, for follow up, with his primary care provider.        Please note that this dictation was created using voice recognition software. I have made every reasonable attempt to correct obvious errors, but I expect that there are errors of grammar and possibly content that I did not discover before finalizing the note. Previous clinic visit encounter reviewed and considered in medical decision making today. I spent a total of 36 minutes with record review, exam, communication with the patient, and documentation of this encounter.          DANGELO Ahn.

## 2023-01-18 VITALS
OXYGEN SATURATION: 99 % | TEMPERATURE: 97.6 F | RESPIRATION RATE: 14 BRPM | HEART RATE: 95 BPM | HEIGHT: 68 IN | WEIGHT: 205 LBS | SYSTOLIC BLOOD PRESSURE: 132 MMHG | BODY MASS INDEX: 31.07 KG/M2 | DIASTOLIC BLOOD PRESSURE: 94 MMHG

## 2023-01-20 LAB
BACTERIA UR CULT: NORMAL
SIGNIFICANT IND 70042: NORMAL
SITE SITE: NORMAL
SOURCE SOURCE: NORMAL

## 2023-03-09 ENCOUNTER — HOSPITAL ENCOUNTER (OUTPATIENT)
Dept: LAB | Facility: MEDICAL CENTER | Age: 73
End: 2023-03-09
Attending: FAMILY MEDICINE
Payer: MEDICARE

## 2023-03-09 DIAGNOSIS — D64.9 ANEMIA, UNSPECIFIED TYPE: ICD-10-CM

## 2023-03-09 DIAGNOSIS — R74.8 ELEVATED ALKALINE PHOSPHATASE LEVEL: ICD-10-CM

## 2023-03-09 LAB
ALBUMIN SERPL BCP-MCNC: 4.3 G/DL (ref 3.2–4.9)
ALBUMIN/GLOB SERPL: 1.4 G/DL
ALP SERPL-CCNC: 83 U/L (ref 30–99)
ALT SERPL-CCNC: 15 U/L (ref 2–50)
ANION GAP SERPL CALC-SCNC: 10 MMOL/L (ref 7–16)
AST SERPL-CCNC: 26 U/L (ref 12–45)
BILIRUB SERPL-MCNC: 0.5 MG/DL (ref 0.1–1.5)
BUN SERPL-MCNC: 15 MG/DL (ref 8–22)
CALCIUM ALBUM COR SERPL-MCNC: 9 MG/DL (ref 8.5–10.5)
CALCIUM SERPL-MCNC: 9.2 MG/DL (ref 8.5–10.5)
CHLORIDE SERPL-SCNC: 106 MMOL/L (ref 96–112)
CO2 SERPL-SCNC: 23 MMOL/L (ref 20–33)
CREAT SERPL-MCNC: 1.14 MG/DL (ref 0.5–1.4)
FASTING STATUS PATIENT QL REPORTED: NORMAL
GFR SERPLBLD CREATININE-BSD FMLA CKD-EPI: 69 ML/MIN/1.73 M 2
GGT SERPL-CCNC: 17 U/L (ref 7–51)
GLOBULIN SER CALC-MCNC: 3 G/DL (ref 1.9–3.5)
GLUCOSE SERPL-MCNC: 84 MG/DL (ref 65–99)
HCT VFR BLD AUTO: 47.8 % (ref 42–52)
HGB BLD-MCNC: 16.2 G/DL (ref 14–18)
IRON SATN MFR SERPL: 38 % (ref 15–55)
IRON SERPL-MCNC: 88 UG/DL (ref 50–180)
POTASSIUM SERPL-SCNC: 4.1 MMOL/L (ref 3.6–5.5)
PROT SERPL-MCNC: 7.3 G/DL (ref 6–8.2)
SODIUM SERPL-SCNC: 139 MMOL/L (ref 135–145)
TIBC SERPL-MCNC: 234 UG/DL (ref 250–450)
UIBC SERPL-MCNC: 146 UG/DL (ref 110–370)

## 2023-03-09 PROCEDURE — 83540 ASSAY OF IRON: CPT

## 2023-03-09 PROCEDURE — 82977 ASSAY OF GGT: CPT

## 2023-03-09 PROCEDURE — 85018 HEMOGLOBIN: CPT

## 2023-03-09 PROCEDURE — 80053 COMPREHEN METABOLIC PANEL: CPT

## 2023-03-09 PROCEDURE — 36415 COLL VENOUS BLD VENIPUNCTURE: CPT

## 2023-03-09 PROCEDURE — 85014 HEMATOCRIT: CPT

## 2023-03-09 PROCEDURE — 83550 IRON BINDING TEST: CPT

## 2023-03-11 ENCOUNTER — HOSPITAL ENCOUNTER (OUTPATIENT)
Facility: MEDICAL CENTER | Age: 73
End: 2023-03-11
Attending: FAMILY MEDICINE
Payer: MEDICARE

## 2023-03-11 PROCEDURE — 82274 ASSAY TEST FOR BLOOD FECAL: CPT

## 2023-03-14 ENCOUNTER — OFFICE VISIT (OUTPATIENT)
Dept: MEDICAL GROUP | Facility: MEDICAL CENTER | Age: 73
End: 2023-03-14
Payer: MEDICARE

## 2023-03-14 VITALS
HEIGHT: 69 IN | OXYGEN SATURATION: 97 % | BODY MASS INDEX: 30.37 KG/M2 | RESPIRATION RATE: 16 BRPM | WEIGHT: 205.03 LBS | TEMPERATURE: 96.8 F | SYSTOLIC BLOOD PRESSURE: 130 MMHG | HEART RATE: 75 BPM | DIASTOLIC BLOOD PRESSURE: 68 MMHG

## 2023-03-14 DIAGNOSIS — Z13.6 SCREENING FOR ISCHEMIC HEART DISEASE: ICD-10-CM

## 2023-03-14 DIAGNOSIS — B49 FUNGUS INFECTION: ICD-10-CM

## 2023-03-14 DIAGNOSIS — L03.039 PARONYCHIA OF FOURTH TOE: ICD-10-CM

## 2023-03-14 DIAGNOSIS — R03.0 ELEVATED BLOOD PRESSURE READING: ICD-10-CM

## 2023-03-14 DIAGNOSIS — Z13.1 SCREENING FOR DIABETES MELLITUS (DM): ICD-10-CM

## 2023-03-14 PROCEDURE — 99214 OFFICE O/P EST MOD 30 MIN: CPT | Performed by: FAMILY MEDICINE

## 2023-03-14 RX ORDER — CEPHALEXIN 500 MG/1
500 CAPSULE ORAL 4 TIMES DAILY
Qty: 40 CAPSULE | Refills: 0 | Status: SHIPPED | OUTPATIENT
Start: 2023-03-14 | End: 2023-03-24

## 2023-03-14 ASSESSMENT — FIBROSIS 4 INDEX: FIB4 SCORE: 1.64

## 2023-03-14 NOTE — ASSESSMENT & PLAN NOTE
He has mild to moderate paronychia of multiple toes   States that this is something he gets frequently     I explain that he is prone to this because he is active (avid hiker) and also he has deformed toe nail from fungal infection (working with [facility], did not tolerate oral anti fungal)     Soaks bid   Leave open to air as much as possible   Rest when irritated     SAFETy net antibiotic rx   Uc precautions discussed, as sometimes this requires andrew    30+ min spent

## 2023-03-14 NOTE — PROGRESS NOTES
This medical record contains text that has been entered with the assistance of computer voice recognition and dictation software.  Therefore, it may contain unintended errors in text, spelling, punctuation, or grammar        Chief Complaint   Patient presents with    Follow-Up     Blood work.  Is he due for the next covid booster.   Toes are inflammed and pink, starting about a week ago. Not painful but sensitive       Uday Osman is a 72 y.o. male here evaluation and management of:     Current Outpatient Medications   Medication Sig Dispense Refill    Probiotic Product (PROBIOTIC DAILY PO) Take  by mouth.      cephALEXin (KEFLEX) 500 MG Cap Take 1 Capsule by mouth 4 times a day for 10 days. 40 Capsule 0    Omega-3 Fatty Acids (FISH OIL) 1000 MG Cap capsule Take 1,000 mg by mouth every day.      Cholecalciferol (VITAMIN D3 PO) Take 1 Tablet by mouth every day.      docusate sodium (COLACE) 100 MG Cap Take 100 mg by mouth 2 times a day as needed. 30 Capsule 0    atorvastatin (LIPITOR) 40 MG Tab TAKE 1 TABLET BY MOUTH EVERY  Tablet 3    psyllium (METAMUCIL) 58.12 % Pack Take 1 Packet by mouth every day.      cetirizine (ZYRTEC) 10 MG Tab Take 10 mg by mouth every day.      ascorbic acid (ASCORBIC ACID) 500 MG TABS Take 1,500 mg by mouth every day.       No current facility-administered medications for this visit.     Patient Active Problem List    Diagnosis Date Noted    Paronychia of fourth toe 03/14/2023    Prostatitis 01/06/2023    Fever and chills 01/06/2023    UTI due to extended-spectrum beta lactamase (ESBL) producing Escherichia coli 01/06/2023    Urinary retention 12/06/2022    Encounter for weight loss counseling 08/30/2022    Urine retention 08/30/2022    Elevated liver enzymes 06/16/2022    Fungus infection 12/23/2021    Left anterior knee pain 07/28/2021    Effects of high altitude 06/17/2021    Elevated blood pressure reading 06/17/2021    Medicare annual wellness visit, subsequent  "2021    History of fever 2020    Mixed hyperlipidemia 2018    Annual physical exam 2018    Diverticulosis of large intestine without hemorrhage 2018    Ventral hernia 2015     Past Surgical History:   Procedure Laterality Date    [facility] TRANSURETHRAL ELEC-SURG PROSTATECTOM  2022    Procedure: BIPOLAR TRANSURETHRAL RESECTION OF PROSTATE;  Surgeon: Phillip Higgins M.D.;  Location: SURGERY [facility];  Service: Urology    VENTRAL HERNIA REPAIR  2015    Performed by Sánchez Mayorga M.D. at SURGERY [facility] ORS    COLONOSCOPY  01/01/2013    X 2    OTHER ORTHOPEDIC SURGERY      fx finger/ age 15      Social History     Tobacco Use    Smoking status: Former     Packs/day: 1.00     Years: 25.00     Pack years: 25.00     Types: Cigarettes     Start date: 10/1/1964     Quit date: 1990     Years since quittin.2    Smokeless tobacco: Never   Vaping Use    Vaping Use: Never used   Substance Use Topics    Alcohol use: Yes     Comment: very occasional    Drug use: Yes     Types: Marijuana     Comment: occasional Cannabis edible rarely     Family History   Problem Relation Age of Onset    Glaucoma Mother     Diabetes Father     Heart Disease Father     Hypertension Father     Hyperlipidemia Father     Stroke Father            ROS    all review of system completed and negative except for those listed above     Objective:     /68 (BP Location: Left arm, Patient Position: Sitting, BP Cuff Size: Adult long)   Pulse 75   Temp 36 °C (96.8 °F) (Temporal)   Resp 16   Ht 1.74 m (5' 8.5\")   Wt 93 kg (205 lb 0.4 oz)   SpO2 97%  Body mass index is 30.72 kg/m².  Physical Exam:      GEN: comfortable, alert and oriented, well nourished, well developed, in no apparent distress   HEENT: NCAT, eyes: pupils equal and reactive, sclera white, EOMIT, good dentition  HEART: limbs warm and well perfused, regular rate, no JVD, no lower extremity edema  LUNGS: speaking in full sentences, " not in apparent respiratory distress, no audible wheezes  MSK: normal tone and bulk, no swelling of the joints, gait steady and normal   Redness and swelling surrounding nail bed         Assessment and Plan:   The following treatment plan was discussed        Problem List Items Addressed This Visit       Fungus infection     Working with [facility] for this     Toe                Paronychia of fourth toe     He has mild to moderate paronychia of multiple toes   States that this is something he gets frequently     I explain that he is prone to this because he is active (avid hiker) and also he has deformed toe nail from fungal infection (working with [facility], did not tolerate oral anti fungal)     Soaks bid   Leave open to air as much as possible   Rest when irritated     SAFETy net antibiotic rx   Uc precautions discussed, as sometimes this requires andrew    30+ min spent              Relevant Medications    cephALEXin (KEFLEX) 500 MG Cap             Instructed to follow up if symptoms worsen or fail to improve, ER/UC precautions discussed as well    Jesi Najera MD  [facility], Family Medicine   [address] [place] [address] [address]   [place] [id number]  Phone: [phone]

## 2023-03-15 LAB — IMM ASSAY OCC BLD FITOB: POSITIVE

## 2023-03-16 ENCOUNTER — TELEPHONE (OUTPATIENT)
Dept: MEDICAL GROUP | Facility: MEDICAL CENTER | Age: 73
End: 2023-03-16
Payer: MEDICARE

## 2023-03-16 DIAGNOSIS — K92.1 BLOOD IN STOOL: Primary | ICD-10-CM

## 2023-03-16 NOTE — TELEPHONE ENCOUNTER
----- Message from Jesi Najera M.D. sent at 3/16/2023 10:42 AM PDT -----  His fecal occult test was +   Blood in stool  So my recommendation is that he consult with gastroenterology

## 2023-03-16 NOTE — TELEPHONE ENCOUNTER
Phone Number Called: [phone] (home)     Call outcome:  Spoke with pt reg his stool test results. Pt verbalized understanding and will schedule an marcy to follow up with GI. Pt to call us for further questions.

## 2023-04-02 ENCOUNTER — OFFICE VISIT (OUTPATIENT)
Dept: URGENT CARE | Facility: CLINIC | Age: 73
End: 2023-04-02
Payer: MEDICARE

## 2023-04-02 VITALS
TEMPERATURE: 98.5 F | RESPIRATION RATE: 14 BRPM | SYSTOLIC BLOOD PRESSURE: 130 MMHG | HEART RATE: 77 BPM | HEIGHT: 69 IN | DIASTOLIC BLOOD PRESSURE: 76 MMHG | BODY MASS INDEX: 31.55 KG/M2 | OXYGEN SATURATION: 98 % | WEIGHT: 213 LBS

## 2023-04-02 DIAGNOSIS — B34.9 VIRAL ILLNESS: ICD-10-CM

## 2023-04-02 LAB
FLUAV RNA SPEC QL NAA+PROBE: NEGATIVE
FLUBV RNA SPEC QL NAA+PROBE: NEGATIVE
RSV RNA SPEC QL NAA+PROBE: NEGATIVE
SARS-COV-2 RNA RESP QL NAA+PROBE: NEGATIVE

## 2023-04-02 PROCEDURE — 87651 STREP A DNA AMP PROBE: CPT | Performed by: PHYSICIAN ASSISTANT

## 2023-04-02 PROCEDURE — 0241U POCT CEPHEID COV-2, FLU A/B, RSV - PCR: CPT | Performed by: PHYSICIAN ASSISTANT

## 2023-04-02 PROCEDURE — 99214 OFFICE O/P EST MOD 30 MIN: CPT | Mod: CS | Performed by: PHYSICIAN ASSISTANT

## 2023-04-02 RX ORDER — BENZONATATE 100 MG/1
100 CAPSULE ORAL 3 TIMES DAILY PRN
Qty: 30 CAPSULE | Refills: 0 | Status: SHIPPED | OUTPATIENT
Start: 2023-04-02 | End: 2024-01-31

## 2023-04-02 ASSESSMENT — ENCOUNTER SYMPTOMS
WHEEZING: 0
ABDOMINAL PAIN: 0
SHORTNESS OF BREATH: 0
COUGH: 1
RHINORRHEA: 1
NAUSEA: 0
VOMITING: 0
MYALGIAS: 0
DIARRHEA: 0
CHILLS: 0
FEVER: 0
NEUROLOGICAL NEGATIVE: 1
PALPITATIONS: 0
SPUTUM PRODUCTION: 0
SORE THROAT: 0

## 2023-04-02 ASSESSMENT — FIBROSIS 4 INDEX: FIB4 SCORE: 1.64

## 2023-04-02 NOTE — PROGRESS NOTES
Subjective     Uday Osman is a very pleasant 72 y.o. male who presents with Cough (X 2 days)            Cough  This is a new problem. The current episode started in the past 7 days (2 days). The problem has been unchanged. The problem occurs every few minutes. The cough is Productive of sputum. Associated symptoms include rhinorrhea. Pertinent negatives include no chest pain, chills, ear pain, fever, myalgias, sore throat, shortness of breath or wheezing. He has tried OTC cough suppressant for the symptoms. The treatment provided mild relief. There is no history of asthma or pneumonia.   Wife sick with same symptoms.  COVID vaccinated with negative at home test.      PMH:  has a past medical history of High cholesterol, Snoring, Urinary bladder disorder (March 2020), and UTI due to extended-spectrum beta lactamase (ESBL) producing Escherichia coli (1/6/2023).    He has no past medical history of Acute nasopharyngitis, Anesthesia, Anginal syndrome (HCC), Arrhythmia, Arthritis, Asthma, Blood clotting disorder ([facility]), Bowel habit changes, Breath shortness, Bronchitis, Cancer ([facility]), Carcinoma in situ of respiratory system, Cataract, Congestive heart failure (HCC), Continuous ambulatory peritoneal dialysis status ([facility]), Coughing blood, Dental disorder, Diabetes (HCC), Dialysis patient ([facility]), Disorder of thyroid, Emphysema of lung (HCC), Glaucoma, Gynecological disorder, Heart burn, Heart murmur, Heart valve disease, Hemorrhagic disorder ([facility]), Hepatitis A, Hepatitis B, Hepatitis C, Hiatus hernia syndrome, Hypertension, Indigestion, Jaundice, Myocardial infarct (HCC), Pacemaker, Pain, Pneumonia, Pregnant, Psychiatric problem, Renal disorder, Rheumatic fever, Seizure (HCC), Sleep apnea, Stroke (HCC), Tuberculosis, or Urinary incontinence.  MEDS:   Current Outpatient Medications:     Probiotic Product (PROBIOTIC DAILY PO), Take  by mouth., Disp: , Rfl:     Omega-3 Fatty Acids (FISH OIL) 1000 MG Cap capsule, Take  1,000 mg by mouth every day., Disp: , Rfl:     Cholecalciferol (VITAMIN D3 PO), Take 1 Tablet by mouth every day., Disp: , Rfl:     docusate sodium (COLACE) 100 MG Cap, Take 100 mg by mouth 2 times a day as needed., Disp: 30 Capsule, Rfl: 0    atorvastatin (LIPITOR) 40 MG Tab, TAKE 1 TABLET BY MOUTH EVERY DAY, Disp: 100 Tablet, Rfl: 3    psyllium (METAMUCIL) 58.12 % Pack, Take 1 Packet by mouth every day., Disp: , Rfl:     cetirizine (ZYRTEC) 10 MG Tab, Take 10 mg by mouth every day., Disp: , Rfl:     ascorbic acid (ASCORBIC ACID) 500 MG TABS, Take 1,500 mg by mouth every day., Disp: , Rfl:   ALLERGIES: No Known Allergies  SURGHX:   Past Surgical History:   Procedure Laterality Date    CT TRANSURETHRAL ELEC-SURG PROSTATECTOM  12/6/2022    Procedure: BIPOLAR TRANSURETHRAL RESECTION OF PROSTATE;  Surgeon: Phillip Higgins M.D.;  Location: SURGERY [facility];  Service: Urology    VENTRAL HERNIA REPAIR  04/22/2015    Performed by Sánchez Mayorga M.D. at SURGERY [facility] ORS    COLONOSCOPY  01/01/2013    X 2    OTHER ORTHOPEDIC SURGERY      fx finger/ age 15     SOCHX:  reports that he quit smoking about 33 years ago. His smoking use included cigarettes. He started smoking about 58 years ago. He has a 25.00 pack-year smoking history. He has never used smokeless tobacco. He reports current alcohol use. He reports current drug use. Drug: Marijuana.  FH: family history includes Diabetes in his father; Glaucoma in his mother; Heart Disease in his father; Hyperlipidemia in his father; Hypertension in his father; Stroke in his father.      Review of Systems   Constitutional:  Negative for chills and fever.   HENT:  Positive for congestion and rhinorrhea. Negative for ear pain and sore throat.    Respiratory:  Positive for cough. Negative for sputum production, shortness of breath and wheezing.    Cardiovascular:  Negative for chest pain, palpitations and leg swelling.   Gastrointestinal:  Negative for abdominal pain,  "diarrhea, nausea and vomiting.   Musculoskeletal:  Negative for myalgias.   Neurological: Negative.         Medications, Allergies, and current problem list reviewed today in Epic        Objective     /76   Pulse 77   Temp 36.9 °C (98.5 °F) (Temporal)   Resp 14   Ht 1.753 m (5' 9\") Comment: per pt  Wt 96.6 kg (213 lb) Comment: w shoes  SpO2 98%   BMI 31.45 kg/m²      Physical Exam  Vitals and nursing note reviewed.   Constitutional:       General: He is not in acute distress.     Appearance: Normal appearance. He is well-developed. He is not ill-appearing, toxic-appearing or diaphoretic.   HENT:      Head: Normocephalic and atraumatic.      Right Ear: Tympanic membrane, ear canal and external ear normal.      Left Ear: Tympanic membrane, ear canal and external ear normal.      Nose: Rhinorrhea present. No congestion.      Mouth/Throat:      Mouth: Mucous membranes are moist.      Pharynx: Oropharynx is clear. No oropharyngeal exudate or posterior oropharyngeal erythema.   Eyes:      General:         Right eye: No discharge.         Left eye: No discharge.      Conjunctiva/sclera: Conjunctivae normal.   Cardiovascular:      Rate and Rhythm: Normal rate and regular rhythm.      Pulses: Normal pulses.      Heart sounds: Normal heart sounds. No murmur heard.  Pulmonary:      Effort: Pulmonary effort is normal. No respiratory distress.      Breath sounds: Normal breath sounds. No wheezing, rhonchi or rales.   Chest:      Chest wall: No tenderness.   Musculoskeletal:         General: No swelling or tenderness.      Cervical back: Normal range of motion and neck supple.      Right lower leg: No edema.      Left lower leg: No edema.   Lymphadenopathy:      Cervical: No cervical adenopathy.   Skin:     General: Skin is warm and dry.   Neurological:      General: No focal deficit present.      Mental Status: He is alert and oriented to person, place, and time.   Psychiatric:         Mood and Affect: Mood normal.  "        Behavior: Behavior normal.         Thought Content: Thought content normal.         Judgment: Judgment normal.                           Assessment & Plan     This is a very pleasant 72-year-old male presenting with congestion and cough for 2 days.  Denies fever chills or body aches.  Denies shortness of breath, wheezing, chest pain, leg swelling or calf tenderness.  Eating and drinking normal without vomiting or diarrhea.  COVID vaccinated with negative at home test.  Wife is sick with similar symptoms.  No pertinent past respiratory history.  PO2 90% vital signs normal.  Lungs are clear bilaterally without wheezing rhonchi or rails.  No signs of focal bacterial infection.  In clinic COVID, flu, RSV testing negative.  Patient be treated for a self-limiting viral illness with OTC meds and conservative measures as discussed.    1. Viral illness  POCT CEPHEID COV-2, FLU A/B, RSV - PCR    benzonatate (TESSALON) 100 MG Cap          Return to clinic or go to ED if symptoms worsen or persist. Red flag symptoms and indications for ED discussed at length. Patient/Parent/Guardian voices understanding. Follow-up with your primary care provider in 3-5 days. All side effects and potential interactions of prescribed medication discussed including allergic response, GI upset, tendon injury, rash, sedation etc.    Please note that this dictation was created using voice recognition software. I have made every reasonable attempt to correct obvious errors, but I expect that there are errors of grammar and possibly content that I did not discover before finalizing the note.

## 2023-04-25 ENCOUNTER — TELEPHONE (OUTPATIENT)
Dept: HEALTH INFORMATION MANAGEMENT | Facility: OTHER | Age: 73
End: 2023-04-25
Payer: MEDICARE

## 2023-06-07 ENCOUNTER — HOSPITAL ENCOUNTER (OUTPATIENT)
Dept: LAB | Facility: MEDICAL CENTER | Age: 73
End: 2023-06-07
Attending: FAMILY MEDICINE
Payer: MEDICARE

## 2023-06-07 DIAGNOSIS — Z13.6 SCREENING FOR ISCHEMIC HEART DISEASE: ICD-10-CM

## 2023-06-07 DIAGNOSIS — Z13.1 SCREENING FOR DIABETES MELLITUS (DM): ICD-10-CM

## 2023-06-07 DIAGNOSIS — R03.0 ELEVATED BLOOD PRESSURE READING: ICD-10-CM

## 2023-06-07 LAB
ANION GAP SERPL CALC-SCNC: 12 MMOL/L (ref 7–16)
BUN SERPL-MCNC: 13 MG/DL (ref 8–22)
CALCIUM SERPL-MCNC: 9.3 MG/DL (ref 8.5–10.5)
CHLORIDE SERPL-SCNC: 102 MMOL/L (ref 96–112)
CHOLEST SERPL-MCNC: 110 MG/DL (ref 100–199)
CO2 SERPL-SCNC: 22 MMOL/L (ref 20–33)
CREAT SERPL-MCNC: 1.22 MG/DL (ref 0.5–1.4)
CREAT UR-MCNC: 82.65 MG/DL
FASTING STATUS PATIENT QL REPORTED: NORMAL
GFR SERPLBLD CREATININE-BSD FMLA CKD-EPI: 63 ML/MIN/1.73 M 2
GLUCOSE SERPL-MCNC: 89 MG/DL (ref 65–99)
HDLC SERPL-MCNC: 44 MG/DL
LDLC SERPL CALC-MCNC: 53 MG/DL
MICROALBUMIN UR-MCNC: 1.3 MG/DL
MICROALBUMIN/CREAT UR: 16 MG/G (ref 0–30)
POTASSIUM SERPL-SCNC: 5 MMOL/L (ref 3.6–5.5)
SODIUM SERPL-SCNC: 136 MMOL/L (ref 135–145)
TRIGL SERPL-MCNC: 65 MG/DL (ref 0–149)

## 2023-06-07 PROCEDURE — 82043 UR ALBUMIN QUANTITATIVE: CPT

## 2023-06-07 PROCEDURE — 80061 LIPID PANEL: CPT

## 2023-06-07 PROCEDURE — 36415 COLL VENOUS BLD VENIPUNCTURE: CPT

## 2023-06-07 PROCEDURE — 80048 BASIC METABOLIC PNL TOTAL CA: CPT

## 2023-06-07 PROCEDURE — 82570 ASSAY OF URINE CREATININE: CPT

## 2023-06-08 ENCOUNTER — HOSPITAL ENCOUNTER (OUTPATIENT)
Facility: MEDICAL CENTER | Age: 73
End: 2023-06-08
Attending: FAMILY MEDICINE
Payer: MEDICARE

## 2023-06-08 PROCEDURE — 82274 ASSAY TEST FOR BLOOD FECAL: CPT

## 2023-06-12 DIAGNOSIS — D64.9 ANEMIA, UNSPECIFIED TYPE: ICD-10-CM

## 2023-06-13 SDOH — HEALTH STABILITY: MENTAL HEALTH
STRESS IS WHEN SOMEONE FEELS TENSE, NERVOUS, ANXIOUS, OR CAN'T SLEEP AT NIGHT BECAUSE THEIR MIND IS TROUBLED. HOW STRESSED ARE YOU?: NOT AT ALL

## 2023-06-13 SDOH — HEALTH STABILITY: PHYSICAL HEALTH: ON AVERAGE, HOW MANY MINUTES DO YOU ENGAGE IN EXERCISE AT THIS LEVEL?: 60 MIN

## 2023-06-13 SDOH — ECONOMIC STABILITY: FOOD INSECURITY: WITHIN THE PAST 12 MONTHS, THE FOOD YOU BOUGHT JUST DIDN'T LAST AND YOU DIDN'T HAVE MONEY TO GET MORE.: NEVER TRUE

## 2023-06-13 SDOH — HEALTH STABILITY: PHYSICAL HEALTH: ON AVERAGE, HOW MANY DAYS PER WEEK DO YOU ENGAGE IN MODERATE TO STRENUOUS EXERCISE (LIKE A BRISK WALK)?: 5 DAYS

## 2023-06-13 SDOH — ECONOMIC STABILITY: INCOME INSECURITY: IN THE LAST 12 MONTHS, WAS THERE A TIME WHEN YOU WERE NOT ABLE TO PAY THE MORTGAGE OR RENT ON TIME?: NO

## 2023-06-13 SDOH — ECONOMIC STABILITY: INCOME INSECURITY: HOW HARD IS IT FOR YOU TO PAY FOR THE VERY BASICS LIKE FOOD, HOUSING, MEDICAL CARE, AND HEATING?: NOT HARD AT ALL

## 2023-06-13 SDOH — ECONOMIC STABILITY: FOOD INSECURITY: WITHIN THE PAST 12 MONTHS, YOU WORRIED THAT YOUR FOOD WOULD RUN OUT BEFORE YOU GOT MONEY TO BUY MORE.: NEVER TRUE

## 2023-06-13 SDOH — ECONOMIC STABILITY: HOUSING INSECURITY: IN THE LAST 12 MONTHS, HOW MANY PLACES HAVE YOU LIVED?: 1

## 2023-06-13 ASSESSMENT — SOCIAL DETERMINANTS OF HEALTH (SDOH)
HOW MANY DRINKS CONTAINING ALCOHOL DO YOU HAVE ON A TYPICAL DAY WHEN YOU ARE DRINKING: 1 OR 2
HOW OFTEN DO YOU HAVE SIX OR MORE DRINKS ON ONE OCCASION: NEVER
WITHIN THE PAST 12 MONTHS, YOU WORRIED THAT YOUR FOOD WOULD RUN OUT BEFORE YOU GOT THE MONEY TO BUY MORE: NEVER TRUE
HOW OFTEN DO YOU ATTEND CHURCH OR RELIGIOUS SERVICES?: NEVER
IN A TYPICAL WEEK, HOW MANY TIMES DO YOU TALK ON THE PHONE WITH FAMILY, FRIENDS, OR NEIGHBORS?: ONCE A WEEK
HOW OFTEN DO YOU ATTEND CHURCH OR RELIGIOUS SERVICES?: NEVER
HOW OFTEN DO YOU ATTENT MEETINGS OF THE CLUB OR ORGANIZATION YOU BELONG TO?: NEVER
DO YOU BELONG TO ANY CLUBS OR ORGANIZATIONS SUCH AS CHURCH GROUPS UNIONS, FRATERNAL OR ATHLETIC GROUPS, OR SCHOOL GROUPS?: NO
IN A TYPICAL WEEK, HOW MANY TIMES DO YOU TALK ON THE PHONE WITH FAMILY, FRIENDS, OR NEIGHBORS?: ONCE A WEEK
HOW OFTEN DO YOU HAVE A DRINK CONTAINING ALCOHOL: MONTHLY OR LESS
DO YOU BELONG TO ANY CLUBS OR ORGANIZATIONS SUCH AS CHURCH GROUPS UNIONS, FRATERNAL OR ATHLETIC GROUPS, OR SCHOOL GROUPS?: NO
HOW OFTEN DO YOU ATTENT MEETINGS OF THE CLUB OR ORGANIZATION YOU BELONG TO?: NEVER
HOW OFTEN DO YOU GET TOGETHER WITH FRIENDS OR RELATIVES?: ONCE A WEEK
HOW HARD IS IT FOR YOU TO PAY FOR THE VERY BASICS LIKE FOOD, HOUSING, MEDICAL CARE, AND HEATING?: NOT HARD AT ALL
HOW OFTEN DO YOU GET TOGETHER WITH FRIENDS OR RELATIVES?: ONCE A WEEK

## 2023-06-13 ASSESSMENT — LIFESTYLE VARIABLES
SKIP TO QUESTIONS 9-10: 1
HOW OFTEN DO YOU HAVE A DRINK CONTAINING ALCOHOL: MONTHLY OR LESS
AUDIT-C TOTAL SCORE: 1
HOW OFTEN DO YOU HAVE SIX OR MORE DRINKS ON ONE OCCASION: NEVER
HOW MANY STANDARD DRINKS CONTAINING ALCOHOL DO YOU HAVE ON A TYPICAL DAY: 1 OR 2

## 2023-06-14 LAB — IMM ASSAY OCC BLD FITOB: NEGATIVE

## 2023-06-16 ENCOUNTER — OFFICE VISIT (OUTPATIENT)
Dept: MEDICAL GROUP | Facility: MEDICAL CENTER | Age: 73
End: 2023-06-16
Payer: MEDICARE

## 2023-06-16 VITALS
HEIGHT: 69 IN | SYSTOLIC BLOOD PRESSURE: 110 MMHG | DIASTOLIC BLOOD PRESSURE: 60 MMHG | WEIGHT: 205.47 LBS | RESPIRATION RATE: 20 BRPM | BODY MASS INDEX: 30.43 KG/M2 | HEART RATE: 72 BPM | OXYGEN SATURATION: 95 % | TEMPERATURE: 98.4 F

## 2023-06-16 DIAGNOSIS — Z13.1 SCREENING FOR DIABETES MELLITUS (DM): ICD-10-CM

## 2023-06-16 DIAGNOSIS — Z00.00 MEDICARE ANNUAL WELLNESS VISIT, SUBSEQUENT: ICD-10-CM

## 2023-06-16 DIAGNOSIS — E78.2 MIXED HYPERLIPIDEMIA: ICD-10-CM

## 2023-06-16 PROCEDURE — 3078F DIAST BP <80 MM HG: CPT | Performed by: FAMILY MEDICINE

## 2023-06-16 PROCEDURE — 3074F SYST BP LT 130 MM HG: CPT | Performed by: FAMILY MEDICINE

## 2023-06-16 PROCEDURE — G0439 PPPS, SUBSEQ VISIT: HCPCS | Performed by: FAMILY MEDICINE

## 2023-06-16 ASSESSMENT — FIBROSIS 4 INDEX: FIB4 SCORE: 1.64

## 2023-06-16 ASSESSMENT — PATIENT HEALTH QUESTIONNAIRE - PHQ9: CLINICAL INTERPRETATION OF PHQ2 SCORE: 0

## 2023-06-16 ASSESSMENT — ACTIVITIES OF DAILY LIVING (ADL): BATHING_REQUIRES_ASSISTANCE: 0

## 2023-06-16 ASSESSMENT — ENCOUNTER SYMPTOMS: GENERAL WELL-BEING: GOOD

## 2023-06-16 NOTE — ASSESSMENT & PLAN NOTE
Problem List Items Addressed This Visit     Mixed hyperlipidemia    Relevant Orders    Lipid Profile   Other Visit Diagnoses     Screening for diabetes mellitus (DM)        Relevant Orders    Basic Metabolic Panel      Age appropriate prev health care discussed   Cancer screening discussed   Vaccines discussed   Labs offered   Blood pressure at goal     Anticipatory guidance including SPF and other skin protective measures, dental hygiene and care, regular exercise, diet, stress and family planning advice discussed.

## 2023-06-16 NOTE — PROGRESS NOTES
Chief Complaint   Patient presents with    Annual Exam       HPI:  Uday is a 72 y.o. here for Medicare Annual Wellness Visit        Patient Active Problem List    Diagnosis Date Noted    BMI 32.0-32.9,adult 05/22/2023    Paronychia of fourth toe 03/14/2023    Prostatitis 01/06/2023    Fever and chills 01/06/2023    UTI due to extended-spectrum beta lactamase (ESBL) producing Escherichia coli 01/06/2023    Urinary retention 12/06/2022    Encounter for weight loss counseling 08/30/2022    Retention of urine 08/30/2022    Elevated liver enzymes 06/16/2022    Fungus infection 12/23/2021    Left anterior knee pain 07/28/2021    Effects of high altitude 06/17/2021    Elevated blood pressure reading 06/17/2021    Medicare annual wellness visit, subsequent 06/17/2021    History of fever 06/25/2020    Mixed hyperlipidemia 03/21/2018    Annual physical exam 03/21/2018    Diverticulosis of large intestine without hemorrhage 03/21/2018    Ventral hernia 04/22/2015       Current Outpatient Medications   Medication Sig Dispense Refill    Probiotic Product (PROBIOTIC DAILY PO) Take  by mouth.      Omega-3 Fatty Acids (FISH OIL) 1000 MG Cap capsule Take 1,000 mg by mouth every day.      Cholecalciferol (VITAMIN D3 PO) Take 1 Tablet by mouth every day.      docusate sodium (COLACE) 100 MG Cap Take 100 mg by mouth 2 times a day as needed. 30 Capsule 0    atorvastatin (LIPITOR) 40 MG Tab TAKE 1 TABLET BY MOUTH EVERY  Tablet 3    psyllium (METAMUCIL) 58.12 % Pack Take 1 Packet by mouth every day.      cetirizine (ZYRTEC) 10 MG Tab Take 10 mg by mouth every day.      ascorbic acid (ASCORBIC ACID) 500 MG TABS Take 1,500 mg by mouth every day.      benzonatate (TESSALON) 100 MG Cap Take 1 Capsule by mouth 3 times a day as needed for Cough. (Patient not taking: Reported on 6/16/2023) 30 Capsule 0     No current facility-administered medications for this visit.        Patient is taking medications as noted in medication  list.  Current supplements as per medication list.     Allergies: Patient has no known allergies.    Current social contact/activities: Pt stays in touch with family and friends. Pt goes golfing with his friends, visits with his daughter and enjoys spending time with his wife.     Is patient current with immunizations? Yes.    He  reports that he quit smoking about 33 years ago. His smoking use included cigarettes. He started smoking about 58 years ago. He has a 25.00 pack-year smoking history. He has never used smokeless tobacco. He reports current alcohol use. He reports current drug use. Drug: Marijuana.  Counseling given: Not Answered      ROS:    Gait: Uses no assistive device   Ostomy: No   Other tubes: No   Amputations: No   Chronic oxygen use No   Last eye exam 2 years ago (Will schedule an appointment)   Wears hearing aids: No   : Denies any urinary leakage during the last 6 months      Depression Screening  Little interest or pleasure in doing things?  0 - not at all  Feeling down, depressed, or hopeless? 0 - not at all  Patient Health Questionnaire Score: 0    If depressive symptoms identified deferred to follow up visit unless specifically addressed in assessment and plan.    Interpretation of PHQ-9 Total Score   Score Severity   1-4 No Depression   5-9 Mild Depression   10-14 Moderate Depression   15-19 Moderately Severe Depression   20-27 Severe Depression    Screening for Cognitive Impairment  Three Minute Recall (daughter, heaven, mountain)  3/3    Juan Manuel clock face with all 12 numbers and set the hands to show 10 past 11.  Yes 5/5  If cognitive concerns identified, deferred for follow up unless specifically addressed in assessment and plan.    Fall Risk Assessment  Has the patient had two or more falls in the last year or any fall with injury in the last year?  No  If fall risk identified, deferred for follow up unless specifically addressed in assessment and plan.    Safety Assessment  Throw rugs  on floor.  Yes  Handrails on all stairs.  Yes  Good lighting in all hallways.  Yes  Difficulty hearing.  No  Patient counseled about all safety risks that were identified.    Functional Assessment ADLs  Are there any barriers preventing you from cooking for yourself or meeting nutritional needs?  No.    Are there any barriers preventing you from driving safely or obtaining transportation?  No.    Are there any barriers preventing you from using a telephone or calling for help?  No.    Are there any barriers preventing you from shopping?  No.    Are there any barriers preventing you from taking care of your own finances?  No.    Are there any barriers preventing you from managing your medications?  No.    Are there any barriers preventing you from showering, bathing or dressing yourself?  No.    Are you currently engaging in any exercise or physical activity?  Yes.  Pt goes to the gym and hikes.  What is your perception of your health?  Good.    Advance Care Planning  Do you have an Advance Directive, Living Will, Durable Power of , or POLST? Yes  Advance Directive       is on file    Health Maintenance Summary            Annual Wellness Visit (Every 366 Days) Next due on 5/22/2024 05/22/2023  Level of Service: ANNUAL WELLNESS VISIT-INCLUDES PPPS SUBSEQUE*    06/16/2022  Visit Dx: Medicare annual wellness visit, subsequent    06/17/2021  Prob Dx: Medicare annual wellness visit, subsequent    06/17/2021  Visit Dx: Medicare annual wellness visit, subsequent              COLORECTAL CANCER SCREENING (COLONOSCOPY - Every 5 Years) Next due on 5/19/2028 06/08/2023  OCCULT BLOOD FECES IMMUNOASSAY    05/19/2023  AMB EXTERNAL COLONOSCOPY RESULTS    05/19/2023  AMB EXTERNAL COLONOSCOPY RESULTS    03/11/2023  OCCULT BLOOD FECES IMMUNOASSAY    08/31/2022  OCCULT BLOOD FECES IMMUNOASSAY    Only the first 5 history entries have been loaded, but more history exists.              IMM DTaP/Tdap/Td Vaccine (3 - Td or  Tdap) Next due on 9/15/2030      09/15/2020  Imm Admin: Tdap Vaccine    05/19/2010  Imm Admin: Tdap Vaccine              IMM PNEUMOCOCCAL VACCINE: 65+ Years (Series Information) Completed      01/01/2020  Imm Admin: Pneumococcal Vaccine (UF) - HISTORICAL DATA    09/20/2017  Imm Admin: Pneumococcal polysaccharide vaccine (PPSV-23)    09/21/2016  Imm Admin: Pneumococcal Conjugate Vaccine (Prevnar/PCV-13)              ABDOMINAL AORTIC ANEURYSM (AAA) SCREEN  Completed      03/11/2021  US-ABDOMEN COMPLETE SURVEY    02/24/2017  US-AORTA              IMM ZOSTER VACCINES (Series Information) Completed      09/02/2021  Imm Admin: Zoster Vaccine Recombinant (RZV) (SHINGRIX)    06/17/2021  Imm Admin: Zoster Vaccine Recombinant (RZV) (SHINGRIX)    05/12/2016  Imm Admin: Zoster Vaccine Live (ZVL) (Zostavax) - HISTORICAL DATA              HEPATITIS C SCREENING  Completed      09/29/2022  HEPATITIS PANEL ACUTE(4 COMPONENTS)    08/18/2022  HEPATITIS PANEL ACUTE(4 COMPONENTS)    06/25/2020  HEP C VIRUS ANTIBODY              IMM INFLUENZA (Series Information) Completed      10/12/2022  Imm Admin: Influenza, Unspecified - HISTORICAL DATA    10/01/2021  Imm Admin: Influenza Vaccine Quad Inj (Preserved)    10/08/2020  Imm Admin: Influenza Vaccine Adult HD    09/24/2019  Imm Admin: Influenza Vaccine Adult HD    09/27/2018  Imm Admin: Influenza Vaccine Adult HD    Only the first 5 history entries have been loaded, but more history exists.              COVID-19 Vaccine (Series Information) Completed      05/15/2023  Imm Admin: MODERNA BIVALENT BOOSTER SARS-COV-2 VACCINE (6+)    10/12/2022  Imm Admin: MODERNA BIVALENT BOOSTER SARS-COV-2 VACCINE (6+)    07/15/2022  Imm Admin: MODERNA SARS-COV-2 VACCINE (12+)    10/24/2021  Imm Admin: MODERNA SARS-COV-2 VACCINE (12+)    03/15/2021  Imm Admin: MODERNA SARS-COV-2 VACCINE (12+)    Only the first 5 history entries have been loaded, but more history exists.              IMM HEP B VACCINE (Series  Information) Aged Out      No completion history exists for this topic.              HPV Vaccines (Series Information) Aged Out      No completion history exists for this topic.              IMM MENINGOCOCCAL ACWY VACCINE (Series Information) Aged Out      No completion history exists for this topic.                    Patient Care Team:  Jesi Najera M.D. as PCP - General (Family Medicine)  Jesi Najera M.D. as PCP - [facility]  Nolberto Mares Ass't as [facility]     Social History     Tobacco Use    Smoking status: Former     Packs/day: 1.00     Years: 25.00     Pack years: 25.00     Types: Cigarettes     Start date: 10/1/1964     Quit date: 1990     Years since quittin.4    Smokeless tobacco: Never   Vaping Use    Vaping Use: Never used   Substance Use Topics    Alcohol use: Yes     Comment: very occasional    Drug use: Yes     Types: Marijuana     Comment: occasional Cannabis edible rarely     Family History   Problem Relation Age of Onset    Glaucoma Mother     Diabetes Father     Heart Disease Father     Hypertension Father     Hyperlipidemia Father     Stroke Father      He  has a past medical history of High cholesterol, Snoring, Urinary bladder disorder (2020), and UTI due to extended-spectrum beta lactamase (ESBL) producing Escherichia coli (2023).    He has no past medical history of Acute nasopharyngitis, Anesthesia, Anginal syndrome (HCC), Arrhythmia, Arthritis, Asthma, Blood clotting disorder (HCC), Bowel habit changes, Breath shortness, Bronchitis, Cancer (HCC), Carcinoma in situ of respiratory system, Cataract, Congestive heart failure (HCC), Continuous ambulatory peritoneal dialysis status ([facility]), Coughing blood, Dental disorder, Diabetes (HCC), Dialysis patient (HCC), Disorder of thyroid, Emphysema of lung (HCC), Glaucoma, Gynecological disorder, Heart burn, Heart murmur, Heart valve disease, Hemorrhagic disorder (HCC), Hepatitis A, Hepatitis  "B, Hepatitis C, Hiatus hernia syndrome, Hypertension, Indigestion, Jaundice, Myocardial infarct (HCC), Pacemaker, Pain, Pneumonia, Pregnant, Psychiatric problem, Renal disorder, Rheumatic fever, Seizure (HCC), Sleep apnea, Stroke (HCC), Tuberculosis, or Urinary incontinence.   Past Surgical History:   Procedure Laterality Date    RI TRANSURETHRAL ELEC-SURG PROSTATECTOM  12/6/2022    Procedure: BIPOLAR TRANSURETHRAL RESECTION OF PROSTATE;  Surgeon: Phillip Higgins M.D.;  Location: SURGERY [facility];  Service: Urology    VENTRAL HERNIA REPAIR  04/22/2015    Performed by Sánchez Mayorga M.D. at SURGERY [facility] ORS    COLONOSCOPY  01/01/2013    X 2    OTHER ORTHOPEDIC SURGERY      fx finger/ age 15       Exam:   /60 (BP Location: Left arm, Patient Position: Sitting, BP Cuff Size: Adult long)   Pulse 72   Temp 36.9 °C (98.4 °F) (Temporal)   Resp 20   Ht 1.74 m (5' 8.5\")   Wt 93.2 kg (205 lb 7.5 oz)   SpO2 95%  Body mass index is 30.79 kg/m².    Hearing excellent .    Dentition good  Alert, oriented in no acute distress.  Eye contact is good, speech goal directed, affect calm      Assessment and Plan. The following treatment and monitoring plan is recommended:    There are no diagnoses linked to this encounter.   Problem List Items Addressed This Visit       Mixed hyperlipidemia    Relevant Orders    Lipid Profile    Medicare annual wellness visit, subsequent     Problem List Items Addressed This Visit       Mixed hyperlipidemia    Relevant Orders    Lipid Profile     Other Visit Diagnoses       Screening for diabetes mellitus (DM)        Relevant Orders    Basic Metabolic Panel        Age appropriate prev health care discussed   Cancer screening discussed   Vaccines discussed   Labs offered   Blood pressure at goal     Anticipatory guidance including SPF and other skin protective measures, dental hygiene and care, regular exercise, diet, stress and family planning advice discussed.            "     Other Visit Diagnoses       Screening for diabetes mellitus (DM)        Relevant Orders    Basic Metabolic Panel            Services suggested: No services needed at this time  Health Care Screening recommendations as per orders if indicated.  Referrals offered: PT/OT/Nutrition counseling/Behavioral Health/Smoking cessation as per orders if indicated.    Discussion today about general wellness and lifestyle habits:    Prevent falls and reduce trip hazards; Cautioned about securing or removing rugs.  Have a working fire alarm and carbon monoxide detector;   Engage in regular physical activity and social activities     Follow-up: No follow-ups on file.

## 2023-09-05 RX ORDER — ATORVASTATIN CALCIUM 40 MG/1
40 TABLET, FILM COATED ORAL DAILY
Qty: 100 TABLET | Refills: 3 | Status: SHIPPED | OUTPATIENT
Start: 2023-09-05

## 2023-09-20 ENCOUNTER — HOSPITAL ENCOUNTER (OUTPATIENT)
Dept: LAB | Facility: MEDICAL CENTER | Age: 73
End: 2023-09-20
Attending: UROLOGY
Payer: MEDICARE

## 2023-09-20 PROCEDURE — 84153 ASSAY OF PSA TOTAL: CPT

## 2023-09-20 PROCEDURE — 36415 COLL VENOUS BLD VENIPUNCTURE: CPT

## 2023-09-20 PROCEDURE — 80053 COMPREHEN METABOLIC PANEL: CPT

## 2023-09-21 LAB
ALBUMIN SERPL BCP-MCNC: 4 G/DL (ref 3.2–4.9)
ALBUMIN/GLOB SERPL: 1.5 G/DL
ALP SERPL-CCNC: 93 U/L (ref 30–99)
ALT SERPL-CCNC: 23 U/L (ref 2–50)
ANION GAP SERPL CALC-SCNC: 10 MMOL/L (ref 7–16)
AST SERPL-CCNC: 37 U/L (ref 12–45)
BILIRUB SERPL-MCNC: 0.5 MG/DL (ref 0.1–1.5)
BUN SERPL-MCNC: 14 MG/DL (ref 8–22)
CALCIUM ALBUM COR SERPL-MCNC: 9 MG/DL (ref 8.5–10.5)
CALCIUM SERPL-MCNC: 9 MG/DL (ref 8.5–10.5)
CHLORIDE SERPL-SCNC: 107 MMOL/L (ref 96–112)
CO2 SERPL-SCNC: 24 MMOL/L (ref 20–33)
CREAT SERPL-MCNC: 0.98 MG/DL (ref 0.5–1.4)
GFR SERPLBLD CREATININE-BSD FMLA CKD-EPI: 82 ML/MIN/1.73 M 2
GLOBULIN SER CALC-MCNC: 2.7 G/DL (ref 1.9–3.5)
GLUCOSE SERPL-MCNC: 78 MG/DL (ref 65–99)
POTASSIUM SERPL-SCNC: 4.1 MMOL/L (ref 3.6–5.5)
PROT SERPL-MCNC: 6.7 G/DL (ref 6–8.2)
PSA SERPL-MCNC: 2.8 NG/ML (ref 0–4)
SODIUM SERPL-SCNC: 141 MMOL/L (ref 135–145)

## 2023-11-16 NOTE — ASSESSMENT & PLAN NOTE
Call concerns, routine follow up six months, labs prior.    Lost 30 lbs   Then regained 20     We discuss medication options at length   tirzepatide   glp1 agonists   If on formulary     qsymia (option to use mail order )  Phentermine

## 2024-01-18 PROBLEM — R50.9 FEVER AND CHILLS: Status: RESOLVED | Noted: 2023-01-06 | Resolved: 2024-01-18

## 2024-01-18 PROBLEM — M25.562 LEFT ANTERIOR KNEE PAIN: Status: RESOLVED | Noted: 2021-07-28 | Resolved: 2024-01-18

## 2024-01-18 PROBLEM — B96.29 UTI DUE TO EXTENDED-SPECTRUM BETA LACTAMASE (ESBL) PRODUCING ESCHERICHIA COLI: Status: RESOLVED | Noted: 2023-01-06 | Resolved: 2024-01-18

## 2024-01-18 PROBLEM — R74.8 ELEVATED LIVER ENZYMES: Status: RESOLVED | Noted: 2022-06-16 | Resolved: 2024-01-18

## 2024-01-18 PROBLEM — R33.9 URINARY RETENTION: Status: ACTIVE | Noted: 2022-08-30

## 2024-01-18 PROBLEM — Z16.12 UTI DUE TO EXTENDED-SPECTRUM BETA LACTAMASE (ESBL) PRODUCING ESCHERICHIA COLI: Status: RESOLVED | Noted: 2023-01-06 | Resolved: 2024-01-18

## 2024-01-18 PROBLEM — N41.9 PROSTATITIS: Status: RESOLVED | Noted: 2023-01-06 | Resolved: 2024-01-18

## 2024-01-18 PROBLEM — N39.0 UTI DUE TO EXTENDED-SPECTRUM BETA LACTAMASE (ESBL) PRODUCING ESCHERICHIA COLI: Status: RESOLVED | Noted: 2023-01-06 | Resolved: 2024-01-18

## 2024-01-18 PROBLEM — K57.30 DIVERTICULOSIS OF LARGE INTESTINE WITHOUT HEMORRHAGE: Status: RESOLVED | Noted: 2018-03-21 | Resolved: 2024-01-18

## 2024-01-18 NOTE — ASSESSMENT & PLAN NOTE
Chronic, stable. Currently taking atorvastatin 40 mg daily  Reports diet is   He is not exercising regularly.  Denies chest pain, claudication, and dizziness.

## 2024-01-19 ASSESSMENT — PATIENT HEALTH QUESTIONNAIRE - PHQ9
1. LITTLE INTEREST OR PLEASURE IN DOING THINGS: NOT AT ALL
2. FEELING DOWN, DEPRESSED, IRRITABLE, OR HOPELESS: NOT AT ALL

## 2024-01-22 ENCOUNTER — APPOINTMENT (OUTPATIENT)
Dept: FAMILY PLANNING/WOMEN'S HEALTH CLINIC | Facility: PHYSICIAN GROUP | Age: 74
End: 2024-01-22
Attending: FAMILY MEDICINE
Payer: MEDICARE

## 2024-01-28 ASSESSMENT — ENCOUNTER SYMPTOMS: GENERAL WELL-BEING: GOOD

## 2024-01-28 ASSESSMENT — ACTIVITIES OF DAILY LIVING (ADL): BATHING_REQUIRES_ASSISTANCE: 0

## 2024-01-29 ASSESSMENT — PATIENT HEALTH QUESTIONNAIRE - PHQ9: CLINICAL INTERPRETATION OF PHQ2 SCORE: 0

## 2024-01-29 NOTE — ASSESSMENT & PLAN NOTE
Chronic, stable. Currently taking atorvastatin 40 mg daily. Provided education on heart healthy diet with adequate intake of fruits, vegetables, and whole grains. Reports going to the gym 4 times a week, both cardio and weight-lifting. Denies chest pain, claudication, and dizziness.

## 2024-01-31 ENCOUNTER — OFFICE VISIT (OUTPATIENT)
Dept: FAMILY PLANNING/WOMEN'S HEALTH CLINIC | Facility: PHYSICIAN GROUP | Age: 74
End: 2024-01-31
Attending: FAMILY MEDICINE
Payer: MEDICARE

## 2024-01-31 VITALS
SYSTOLIC BLOOD PRESSURE: 138 MMHG | HEIGHT: 69 IN | WEIGHT: 215 LBS | DIASTOLIC BLOOD PRESSURE: 78 MMHG | BODY MASS INDEX: 31.84 KG/M2

## 2024-01-31 DIAGNOSIS — R33.9 URINARY RETENTION: ICD-10-CM

## 2024-01-31 DIAGNOSIS — E78.2 MIXED HYPERLIPIDEMIA: ICD-10-CM

## 2024-01-31 PROBLEM — L03.039: Status: RESOLVED | Noted: 2023-03-14 | Resolved: 2024-01-31

## 2024-01-31 PROBLEM — B49 FUNGUS INFECTION: Status: RESOLVED | Noted: 2021-12-23 | Resolved: 2024-01-31

## 2024-01-31 PROCEDURE — 3078F DIAST BP <80 MM HG: CPT

## 2024-01-31 PROCEDURE — 3075F SYST BP GE 130 - 139MM HG: CPT

## 2024-01-31 PROCEDURE — G0439 PPPS, SUBSEQ VISIT: HCPCS

## 2024-01-31 PROCEDURE — 1125F AMNT PAIN NOTED PAIN PRSNT: CPT

## 2024-01-31 SDOH — SOCIAL STABILITY: SOCIAL NETWORK
IN A TYPICAL WEEK, HOW MANY TIMES DO YOU TALK ON THE PHONE WITH FAMILY, FRIENDS, OR NEIGHBORS?: MORE THAN THREE TIMES A WEEK

## 2024-01-31 SDOH — ECONOMIC STABILITY: INCOME INSECURITY: IN THE PAST 12 MONTHS, HAS THE ELECTRIC, GAS, OIL, OR WATER COMPANY THREATENED TO SHUT OFF SERVICE IN YOUR HOME?: NO

## 2024-01-31 SDOH — ECONOMIC STABILITY: FOOD INSECURITY: WITHIN THE PAST 12 MONTHS, YOU WORRIED THAT YOUR FOOD WOULD RUN OUT BEFORE YOU GOT MONEY TO BUY MORE.: NEVER TRUE

## 2024-01-31 SDOH — SOCIAL STABILITY: SOCIAL NETWORK: HOW OFTEN DO YOU ATTENT MEETINGS OF THE CLUB OR ORGANIZATION YOU BELONG TO?: 1 TO 4 TIMES PER YEAR

## 2024-01-31 SDOH — SOCIAL STABILITY: SOCIAL NETWORK: ARE YOU MARRIED, WIDOWED, DIVORCED, SEPARATED, NEVER MARRIED, OR LIVING WITH A PARTNER?: MARRIED

## 2024-01-31 SDOH — HEALTH STABILITY: MENTAL HEALTH: HOW MANY STANDARD DRINKS CONTAINING ALCOHOL DO YOU HAVE ON A TYPICAL DAY?: PATIENT DOES NOT DRINK

## 2024-01-31 SDOH — HEALTH STABILITY: MENTAL HEALTH: HOW OFTEN DO YOU HAVE 6 OR MORE DRINKS ON ONE OCCASION?: NEVER

## 2024-01-31 SDOH — ECONOMIC STABILITY: FOOD INSECURITY: WITHIN THE PAST 12 MONTHS, THE FOOD YOU BOUGHT JUST DIDN'T LAST AND YOU DIDN'T HAVE MONEY TO GET MORE.: NEVER TRUE

## 2024-01-31 SDOH — ECONOMIC STABILITY: HOUSING INSECURITY: IN THE LAST 12 MONTHS, HOW MANY PLACES HAVE YOU LIVED?: 1

## 2024-01-31 SDOH — SOCIAL STABILITY: SOCIAL NETWORK
DO YOU BELONG TO ANY CLUBS OR ORGANIZATIONS SUCH AS CHURCH GROUPS UNIONS, FRATERNAL OR ATHLETIC GROUPS, OR SCHOOL GROUPS?: NO

## 2024-01-31 SDOH — ECONOMIC STABILITY: INCOME INSECURITY: HOW HARD IS IT FOR YOU TO PAY FOR THE VERY BASICS LIKE FOOD, HOUSING, MEDICAL CARE, AND HEATING?: NOT HARD AT ALL

## 2024-01-31 SDOH — HEALTH STABILITY: PHYSICAL HEALTH: ON AVERAGE, HOW MANY MINUTES DO YOU ENGAGE IN EXERCISE AT THIS LEVEL?: 60 MIN

## 2024-01-31 SDOH — ECONOMIC STABILITY: INCOME INSECURITY: IN THE LAST 12 MONTHS, WAS THERE A TIME WHEN YOU WERE NOT ABLE TO PAY THE MORTGAGE OR RENT ON TIME?: NO

## 2024-01-31 SDOH — HEALTH STABILITY: PHYSICAL HEALTH: ON AVERAGE, HOW MANY DAYS PER WEEK DO YOU ENGAGE IN MODERATE TO STRENUOUS EXERCISE (LIKE A BRISK WALK)?: 4 DAYS

## 2024-01-31 SDOH — SOCIAL STABILITY: SOCIAL NETWORK: HOW OFTEN DO YOU GET TOGETHER WITH FRIENDS OR RELATIVES?: NEVER

## 2024-01-31 SDOH — HEALTH STABILITY: MENTAL HEALTH: HOW OFTEN DO YOU HAVE A DRINK CONTAINING ALCOHOL?: NEVER

## 2024-01-31 SDOH — SOCIAL STABILITY: SOCIAL NETWORK: HOW OFTEN DO YOU ATTEND CHURCH OR RELIGIOUS SERVICES?: NEVER

## 2024-01-31 ASSESSMENT — LIFESTYLE VARIABLES
AUDIT-C TOTAL SCORE: 0
SKIP TO QUESTIONS 9-10: 1

## 2024-01-31 ASSESSMENT — FIBROSIS 4 INDEX: FIB4 SCORE: 1.91

## 2024-01-31 ASSESSMENT — PAIN SCALES - GENERAL: PAINLEVEL: 2=MINIMAL-SLIGHT

## 2024-01-31 NOTE — PROGRESS NOTES
Comprehensive Health Assessment Program     Uday Osman is a 73 y.o. here for his comprehensive health assessment.    Patient Active Problem List    Diagnosis Date Noted    BMI 32.0-32.9,adult 2023    Urinary retention 2022    Mixed hyperlipidemia 2018       Current Outpatient Medications   Medication Sig Dispense Refill    atorvastatin (LIPITOR) 40 MG Tab TAKE 1 TABLET BY MOUTH EVERY  Tablet 3    Probiotic Product (PROBIOTIC DAILY PO) Take  by mouth.      Omega-3 Fatty Acids (FISH OIL) 1000 MG Cap capsule Take 1,000 mg by mouth every day.      Cholecalciferol (VITAMIN D3 PO) Take 1 Tablet by mouth every day.      psyllium (METAMUCIL) 58.12 % Pack Take 1 Packet by mouth every day.      cetirizine (ZYRTEC) 10 MG Tab Take 10 mg by mouth every day.      ascorbic acid (ASCORBIC ACID) 500 MG TABS Take 1,500 mg by mouth every day.       No current facility-administered medications for this visit.          Current supplements as per medication list.     Allergies:   Patient has no known allergies.  Social History     Tobacco Use    Smoking status: Former     Current packs/day: 0.00     Average packs/day: 1 pack/day for 25.3 years (25.3 ttl pk-yrs)     Types: Cigarettes     Start date: 10/1/1964     Quit date: 1990     Years since quittin.1    Smokeless tobacco: Never   Vaping Use    Vaping Use: Never used   Substance Use Topics    Alcohol use: Yes     Comment: very occasional    Drug use: Yes     Types: Marijuana     Comment: occasional Cannabis edible rarely     Family History   Problem Relation Age of Onset    Glaucoma Mother     Diabetes Father     Heart Disease Father     Hypertension Father     Hyperlipidemia Father     Stroke Father      Uday  has a past medical history of Diverticulosis of large intestine without hemorrhage (2018), Elevated liver enzymes (2022), Fever and chills (2023), Fungus infection (2021), High cholesterol, Left anterior  knee pain (07/28/2021), Paronychia of fourth toe (03/14/2023), Prostatitis (01/06/2023), Snoring, Urinary bladder disorder (March 2020), UTI due to extended-spectrum beta lactamase (ESBL) producing Escherichia coli (01/06/2023), and Ventral hernia (04/22/2015).    He has no past medical history of Acute nasopharyngitis, Anesthesia, Anginal syndrome (HCC), Arrhythmia, Arthritis, Asthma, Blood clotting disorder (HCC), Bowel habit changes, Breath shortness, Bronchitis, Cancer (HCC), Carcinoma in situ of respiratory system, Cataract, Congestive heart failure (HCC), Continuous ambulatory peritoneal dialysis status (HCC), Coughing blood, Dental disorder, Diabetes (HCC), Dialysis patient (HCC), Disorder of thyroid, Emphysema of lung (HCC), Glaucoma, Gynecological disorder, Heart burn, Heart murmur, Heart valve disease, Hemorrhagic disorder (HCC), Hepatitis A, Hepatitis B, Hepatitis C, Hiatus hernia syndrome, Hypertension, Indigestion, Jaundice, Myocardial infarct (HCC), Pacemaker, Pain, Pneumonia, Pregnant, Psychiatric problem, Renal disorder, Rheumatic fever, Seizure (HCC), Sleep apnea, Stroke (HCC), Tuberculosis, or Urinary incontinence.   Past Surgical History:   Procedure Laterality Date    AR TRANSURETHRAL ELEC-SURG PROSTATECTOM  12/6/2022    Procedure: BIPOLAR TRANSURETHRAL RESECTION OF PROSTATE;  Surgeon: Phillip Higgins M.D.;  Location: SURGERY [facility];  Service: Urology    VENTRAL HERNIA REPAIR  04/22/2015    Performed by Sánchez Mayorga M.D. at SURGERY [facility]    COLONOSCOPY  01/01/2013    X 2    OTHER ORTHOPEDIC SURGERY      fx finger/ age 15       Screening:  In the last six months have you experienced any leakage of urine? No    Depression Screening  Little interest or pleasure in doing things?  0 - not at all  Feeling down, depressed , or hopeless? 0 - not at all  Patient Health Questionnaire Score: 0     If depressive symptoms identified deferred to follow up visit unless specifically addressed  in assessment and plan.    Interpretation of PHQ-9 Total Score   Score Severity   1-4 No Depression   5-9 Mild Depression   10-14 Moderate Depression   15-19 Moderately Severe Depression   20-27 Severe Depression    Screening for Cognitive Impairment  Do you or any of your friends or family members have any concern about your memory? No  Three Minute Recall (Banana, Sunrise, Chair) 3/3    Juan Manuel clock face with all 12 numbers and set the hands to show 20 past 8.  Yes    Cognitive concerns identified deferred for follow up unless specifically addressed in assessment and plan.    Fall Risk Assessment  Has the patient had two or more falls in the last year or any fall with injury in the last year?  No    Safety Assessment  Do you always wear your seatbelt?  Yes  Any changes to home needed to function safely? No  Difficulty hearing.  Yes  Patient counseled about all safety risks that were identified.    Functional Assessment ADLs  Are there any barriers preventing you from cooking for yourself or meeting nutritional needs?  No.    Are there any barriers preventing you from driving safely or obtaining transportation?  No.    Are there any barriers preventing you from using a telephone or calling for help?  No    Are there any barriers preventing you from shopping?  No.    Are there any barriers preventing you from taking care of your own finances?  No    Are there any barriers preventing you from managing your medications?  No    Are there any barriers preventing you from showering, bathing or dressing yourself? No    Are there any barriers preventing you from doing housework or laundry? No  Are there any barriers preventing you from using the toilet?No  Are you currently engaging in any exercise or physical activity?  Yes.      Self-Assessment of Health  What is your perception of your health? Good  Do you sleep more than six hours a night? No  In the past 7 days, how much did pain keep you from doing your normal work?  None  Do you spend quality time with family or friends (virtually or in person)? Yes  Do you usually eat a heart healthy diet that constists of a variety of fruits, vegetables, whole grains and fiber? No  Do you eat foods high in fat and/or Fast Food more than three times per week? No    Advance Care Planning  Do you have an Advance Directive, Living Will, Durable Power of , or POLST? Yes  Advance Directive Living Will     is not on file - instructed patient to bring in a copy to scan into their chart      Health Maintenance Summary            Annual Wellness Visit (Yearly) Next due on 1/31/2025 01/31/2024  Level of Service: [place] ANNUAL WELLNESS VISIT-INCLUDES PPPS SUBSEQUE*    06/16/2023  Visit Dx: Medicare annual wellness visit, subsequent    05/22/2023  Level of Service: [place] ANNUAL WELLNESS VISIT-INCLUDES PPPS SUBSEQUE*    06/16/2022  Visit Dx: Medicare annual wellness visit, subsequent    06/17/2021  Visit Dx: Medicare annual wellness visit, subsequent    Only the first 5 history entries have been loaded, but more history exists.              Colorectal Cancer Screening (Colonoscopy - Every 5 Years) Tentatively due on 5/19/2028 06/08/2023  OCCULT BLOOD FECES IMMUNOASSAY    05/19/2023  AMB EXTERNAL COLONOSCOPY RESULTS    05/19/2023  AMB EXTERNAL COLONOSCOPY RESULTS    03/11/2023  OCCULT BLOOD FECES IMMUNOASSAY    08/31/2022  OCCULT BLOOD FECES IMMUNOASSAY    Only the first 5 history entries have been loaded, but more history exists.              IMM DTaP/Tdap/Td Vaccine (3 - Td or Tdap) Next due on 9/15/2030      09/15/2020  Imm Admin: Tdap Vaccine    05/19/2010  Imm Admin: Tdap Vaccine              Pneumococcal Vaccine: 65+ Years (Series Information) Completed      01/01/2020  Imm Admin: Pneumococcal Vaccine (UF) - HISTORICAL DATA    09/20/2017  Imm Admin: Pneumococcal polysaccharide vaccine (PPSV-23)    09/21/2016  Imm Admin: Pneumococcal Conjugate Vaccine (Prevnar/PCV-13)              Abdominal  Aortic Aneurysm (AAA) Screening  Tentatively Complete      03/11/2021  US-ABDOMEN COMPLETE SURVEY    02/24/2017  US-AORTA              Zoster (Shingles) Vaccines (Series Information) Completed      09/02/2021  Imm Admin: Zoster Vaccine Recombinant (RZV) (SHINGRIX)    06/17/2021  Imm Admin: Zoster Vaccine Recombinant (RZV) (SHINGRIX)    05/12/2016  Imm Admin: Zoster Vaccine Live (ZVL) (Zostavax) - HISTORICAL DATA              Hepatitis C Screening  Tentatively Complete      09/29/2022  Hepatitis C Antibody component of HEPATITIS PANEL ACUTE(4 COMPONENTS)    08/18/2022  Hepatitis C Antibody component of HEPATITIS PANEL ACUTE(4 COMPONENTS)    06/25/2020  Hepatitis C Antibody component of HEP C VIRUS ANTIBODY              Influenza Vaccine (Series Information) Completed      09/29/2023  Imm Admin: Influenza, Unspecified - HISTORICAL DATA    10/12/2022  Imm Admin: Influenza, Unspecified - HISTORICAL DATA    10/01/2021  Imm Admin: Influenza Vaccine Quad Inj (Preserved)    10/08/2020  Imm Admin: Influenza Vaccine Adult HD    09/24/2019  Imm Admin: Influenza Vaccine Adult HD    Only the first 5 history entries have been loaded, but more history exists.              COVID-19 Vaccine (Series Information) Completed      09/29/2023  Outside Immunization: COVID-19(MOD) 12yrs \T\ up    05/15/2023  Imm Admin: MODERNA BIVALENT BOOSTER SARS-COV-2 VACCINE (6+)    10/12/2022  Imm Admin: MODERNA BIVALENT BOOSTER SARS-COV-2 VACCINE (6+)    07/15/2022  Imm Admin: MODERNA SARS-COV-2 VACCINE (12+)    10/24/2021  Imm Admin: MODERNA SARS-COV-2 VACCINE (12+)    Only the first 5 history entries have been loaded, but more history exists.              Hepatitis A Vaccine (Hep A) (Series Information) Aged Out      No completion history exists for this topic.              Hepatitis B Vaccine (Hep B) (Series Information) Aged Out      No completion history exists for this topic.              HPV Vaccines (Series Information) Aged Out      No  "completion history exists for this topic.              Polio Vaccine (Inactivated Polio) (Series Information) Aged Out      No completion history exists for this topic.              Meningococcal Immunization (Series Information) Aged Out      No completion history exists for this topic.                    Patient Care Team:  Jesi Najera M.D. as PCP - General (Family Medicine)  Jesi Najera M.D. as PCP - [facility] Paneled  Tata Clement, Med Ass't as [facility]       Financial Resource Strain: Low Risk  (1/31/2024)    Overall Financial Resource Strain (CARDIA)     Difficulty of Paying Living Expenses: Not hard at all      Transportation Needs: No Transportation Needs (1/31/2024)    PRAPARE - Transportation     Lack of Transportation (Medical): No     Lack of Transportation (Non-Medical): No      Food Insecurity: No Food Insecurity (1/31/2024)    Hunger Vital Sign     Worried About Running Out of Food in the Last Year: Never true     Ran Out of Food in the Last Year: Never true        Encounter Vitals  Blood Pressure : 138/78  Weight: 97.5 kg (215 lb)  Height: 174 cm (5' 8.5\")  BMI (Calculated): 32.22  Pain Score: 2=Minimal-Slight     Alert, oriented in no acute distress.  Eye contact is good, speech goal directed, affect calm.    Assessment and Plan. The following treatment and monitoring plan is recommended:    Mixed hyperlipidemia  Chronic, stable. Currently taking atorvastatin 40 mg daily. Reports that his diet could be improved. Provided education on heart healthy diet with adequate intake of fruits, vegetables, and whole grains. Reports going to the gym 4 times a week, both cardio and weight-lifting. Denies chest pain, claudication, and dizziness.    Urinary retention  Chronic, stable. Reports that retention has improved significantly since TURP in December 2022. Followed by urology, Dr. Higgins.       Services suggested: No services needed at this time  Health Care Screening: " Age-appropriate preventive services recommended by USPTF and ACIP covered by Medicare were discussed today. Services ordered if indicated and agreed upon by the patient.  Referrals offered: Community-based lifestyle interventions to reduce health risks and promote self-management and wellness, fall prevention, nutrition, physical activity, tobacco-use cessation, weight loss, and mental health services as per orders if indicated.    Discussion today about general wellness and lifestyle habits:    Prevent falls and reduce trip hazards; Cautioned about securing or removing rugs.  Have a working fire alarm and carbon monoxide detector.  Engage in regular physical activity and social activities.    Follow-up: Return for appointment with Primary Care Provider as needed.

## 2024-02-03 ENCOUNTER — HOSPITAL ENCOUNTER (OUTPATIENT)
Dept: LAB | Facility: MEDICAL CENTER | Age: 74
End: 2024-02-03
Attending: FAMILY MEDICINE
Payer: MEDICARE

## 2024-02-03 DIAGNOSIS — E78.2 MIXED HYPERLIPIDEMIA: ICD-10-CM

## 2024-02-03 DIAGNOSIS — Z13.1 SCREENING FOR DIABETES MELLITUS (DM): ICD-10-CM

## 2024-02-03 LAB
ANION GAP SERPL CALC-SCNC: 11 MMOL/L (ref 7–16)
BUN SERPL-MCNC: 17 MG/DL (ref 8–22)
CALCIUM SERPL-MCNC: 9.4 MG/DL (ref 8.5–10.5)
CHLORIDE SERPL-SCNC: 105 MMOL/L (ref 96–112)
CHOLEST SERPL-MCNC: 157 MG/DL (ref 100–199)
CO2 SERPL-SCNC: 22 MMOL/L (ref 20–33)
CREAT SERPL-MCNC: 1.07 MG/DL (ref 0.5–1.4)
FASTING STATUS PATIENT QL REPORTED: NORMAL
GFR SERPLBLD CREATININE-BSD FMLA CKD-EPI: 74 ML/MIN/1.73 M 2
GLUCOSE SERPL-MCNC: 96 MG/DL (ref 65–99)
HDLC SERPL-MCNC: 43 MG/DL
LDLC SERPL CALC-MCNC: 93 MG/DL
POTASSIUM SERPL-SCNC: 4.8 MMOL/L (ref 3.6–5.5)
SODIUM SERPL-SCNC: 138 MMOL/L (ref 135–145)
TRIGL SERPL-MCNC: 103 MG/DL (ref 0–149)

## 2024-02-03 PROCEDURE — 36415 COLL VENOUS BLD VENIPUNCTURE: CPT

## 2024-02-03 PROCEDURE — 80048 BASIC METABOLIC PNL TOTAL CA: CPT

## 2024-02-03 PROCEDURE — 80061 LIPID PANEL: CPT

## 2024-02-06 ENCOUNTER — APPOINTMENT (OUTPATIENT)
Dept: MEDICAL GROUP | Facility: MEDICAL CENTER | Age: 74
End: 2024-02-06
Payer: MEDICARE

## 2024-02-08 ENCOUNTER — APPOINTMENT (OUTPATIENT)
Dept: MEDICAL GROUP | Facility: MEDICAL CENTER | Age: 74
End: 2024-02-08
Payer: MEDICARE

## 2024-02-08 ENCOUNTER — OFFICE VISIT (OUTPATIENT)
Dept: MEDICAL GROUP | Facility: MEDICAL CENTER | Age: 74
End: 2024-02-08
Payer: MEDICARE

## 2024-02-08 VITALS
DIASTOLIC BLOOD PRESSURE: 64 MMHG | HEIGHT: 69 IN | TEMPERATURE: 98.1 F | WEIGHT: 217 LBS | HEART RATE: 83 BPM | RESPIRATION RATE: 20 BRPM | BODY MASS INDEX: 32.14 KG/M2 | SYSTOLIC BLOOD PRESSURE: 110 MMHG | OXYGEN SATURATION: 94 %

## 2024-02-08 DIAGNOSIS — M25.562 PAIN IN BOTH KNEES, UNSPECIFIED CHRONICITY: ICD-10-CM

## 2024-02-08 DIAGNOSIS — Z71.84 TRAVEL ADVICE ENCOUNTER: ICD-10-CM

## 2024-02-08 DIAGNOSIS — M25.561 PAIN IN BOTH KNEES, UNSPECIFIED CHRONICITY: ICD-10-CM

## 2024-02-08 PROCEDURE — 3074F SYST BP LT 130 MM HG: CPT | Performed by: FAMILY MEDICINE

## 2024-02-08 PROCEDURE — 3078F DIAST BP <80 MM HG: CPT | Performed by: FAMILY MEDICINE

## 2024-02-08 PROCEDURE — 99213 OFFICE O/P EST LOW 20 MIN: CPT | Performed by: FAMILY MEDICINE

## 2024-02-08 RX ORDER — ACETAZOLAMIDE 125 MG/1
125 TABLET ORAL 3 TIMES DAILY
Qty: 45 TABLET | Refills: 1 | Status: SHIPPED | OUTPATIENT
Start: 2024-02-08 | End: 2024-02-20

## 2024-02-08 ASSESSMENT — FIBROSIS 4 INDEX: FIB4 SCORE: 1.91

## 2024-02-09 NOTE — PROGRESS NOTES
This medical record contains text that has been entered with the assistance of computer voice recognition and dictation software.  Therefore, it may contain unintended errors in text, spelling, punctuation, or grammar        Chief Complaint   Patient presents with    Pain     Achy bilat knees and bilat hip joint pain mostly when trying to sleep x 2-3 years but getting worse.     Travel Consult     Pt plans to visit [place] mid May, need his RX Diamox for altitude        Uday Osman is a 73 y.o. male here evaluation and management of:       Current Outpatient Medications   Medication Sig Dispense Refill    acetaZOLAMIDE (DIAMOX) 125 MG Tab Take 1 Tablet by mouth 3 times a day. 45 Tablet 1    atorvastatin (LIPITOR) 40 MG Tab TAKE 1 TABLET BY MOUTH EVERY  Tablet 3    Probiotic Product (PROBIOTIC DAILY PO) Take  by mouth.      Omega-3 Fatty Acids (FISH OIL) 1000 MG Cap capsule Take 1,000 mg by mouth every day.      Cholecalciferol (VITAMIN D3 PO) Take 1 Tablet by mouth every day.      psyllium (METAMUCIL) 58.12 % Pack Take 1 Packet by mouth every day.      cetirizine (ZYRTEC) 10 MG Tab Take 10 mg by mouth every day.      ascorbic acid (ASCORBIC ACID) 500 MG TABS Take 1,500 mg by mouth every day.       No current facility-administered medications for this visit.     Patient Active Problem List    Diagnosis Date Noted    Pain in both knees 02/08/2024    Travel advice encounter 02/08/2024    BMI 32.0-32.9,adult 05/22/2023    Urinary retention 08/30/2022    Mixed hyperlipidemia 03/21/2018     Past Surgical History:   Procedure Laterality Date    [facility] TRANSURETHRAL ELEC-SURG PROSTATECTOM  12/6/2022    Procedure: BIPOLAR TRANSURETHRAL RESECTION OF PROSTATE;  Surgeon: Phillip Higgins M.D.;  Location: SURGERY [facility] ER;  Service: Urology    VENTRAL HERNIA REPAIR  04/22/2015    Performed by Sánchez Mayorga M.D. at SURGERY [facility] ORS    COLONOSCOPY  01/01/2013    X 2    OTHER ORTHOPEDIC SURGERY      fx finger/  "age 15      Social History     Tobacco Use    Smoking status: Former     Current packs/day: 0.00     Average packs/day: 1 pack/day for 25.3 years (25.3 ttl pk-yrs)     Types: Cigarettes     Start date: 10/1/1964     Quit date: 1990     Years since quittin.1    Smokeless tobacco: Never   Vaping Use    Vaping Use: Never used   Substance Use Topics    Alcohol use: Yes     Comment: very occasional    Drug use: Yes     Types: Marijuana     Comment: occasional Cannabis edible rarely     Family History   Problem Relation Age of Onset    Glaucoma Mother     Diabetes Father     Heart Disease Father     Hypertension Father     Hyperlipidemia Father     Stroke Father            ROS    all review of system completed and negative except for those listed above     Objective:     /64 (BP Location: Left arm, Patient Position: Sitting, BP Cuff Size: Large adult)   Pulse 83   Temp 36.7 °C (98.1 °F) (Temporal)   Resp 20   Ht 1.74 m (5' 8.5\")   Wt 98.4 kg (217 lb)   SpO2 94%  Body mass index is 32.51 kg/m².  Physical Exam:      GEN: comfortable, alert and oriented, well nourished, well developed, in no apparent distress   HEENT: NCAT, eyes: pupils equal and reactive, sclera white, EOMIT, good dentition  HEART: limbs warm and well perfused, regular rate, no JVD, no lower extremity edema  LUNGS: speaking in full sentences, not in apparent respiratory distress, no audible wheezes  MSK: normal tone and bulk, no swelling of the joints, gait steady and normal           Assessment and Plan:   The following treatment plan was discussed        Problem List Items Addressed This Visit       Pain in both knees     Encouraged to follow up with [facility]              Relevant Orders    Referral to Sports Medicine    DX-KNEES-AP BILATERAL STANDING    Travel advice encounter     Going to [place]   Needs diamox     [facility]   15+ min spent          Relevant Medications    acetaZOLAMIDE (DIAMOX) 125 MG Tab    Other " Relevant Orders    Referral to Other             Instructed to follow up if symptoms worsen or fail to improve, ER/UC precautions discussed as well    Jesi Najera MD  [facility], [address]   [place] [address]  Phone: [phone]

## 2024-02-15 ENCOUNTER — HOSPITAL ENCOUNTER (OUTPATIENT)
Dept: RADIOLOGY | Facility: MEDICAL CENTER | Age: 74
End: 2024-02-15
Attending: FAMILY MEDICINE
Payer: MEDICARE

## 2024-02-15 DIAGNOSIS — M25.562 PAIN IN BOTH KNEES, UNSPECIFIED CHRONICITY: ICD-10-CM

## 2024-02-15 DIAGNOSIS — M25.561 PAIN IN BOTH KNEES, UNSPECIFIED CHRONICITY: ICD-10-CM

## 2024-02-15 PROCEDURE — 73565 X-RAY EXAM OF KNEES: CPT

## 2024-02-16 DIAGNOSIS — Z71.84 TRAVEL ADVICE ENCOUNTER: ICD-10-CM

## 2024-02-20 ENCOUNTER — PATIENT MESSAGE (OUTPATIENT)
Dept: MEDICAL GROUP | Facility: MEDICAL CENTER | Age: 74
End: 2024-02-20
Payer: MEDICARE

## 2024-02-20 DIAGNOSIS — Z71.84 TRAVEL ADVICE ENCOUNTER: ICD-10-CM

## 2024-02-20 RX ORDER — ACETAZOLAMIDE 125 MG/1
125 TABLET ORAL 3 TIMES DAILY
Qty: 270 TABLET | Refills: 1 | Status: SHIPPED | OUTPATIENT
Start: 2024-02-20 | End: 2024-02-20 | Stop reason: SDUPTHER

## 2024-02-21 ENCOUNTER — OFFICE VISIT (OUTPATIENT)
Dept: MEDICAL GROUP | Facility: OTHER | Age: 74
End: 2024-02-21
Attending: FAMILY MEDICINE
Payer: MEDICARE

## 2024-02-21 VITALS
OXYGEN SATURATION: 99 % | BODY MASS INDEX: 32.29 KG/M2 | SYSTOLIC BLOOD PRESSURE: 140 MMHG | HEART RATE: 80 BPM | HEIGHT: 69 IN | WEIGHT: 218 LBS | DIASTOLIC BLOOD PRESSURE: 78 MMHG | TEMPERATURE: 97.8 F

## 2024-02-21 DIAGNOSIS — G89.29 CHRONIC PAIN OF BOTH KNEES: ICD-10-CM

## 2024-02-21 DIAGNOSIS — M25.562 CHRONIC PAIN OF BOTH KNEES: ICD-10-CM

## 2024-02-21 DIAGNOSIS — M89.9 BONE LESION: ICD-10-CM

## 2024-02-21 DIAGNOSIS — M25.561 CHRONIC PAIN OF BOTH KNEES: ICD-10-CM

## 2024-02-21 PROCEDURE — 99213 OFFICE O/P EST LOW 20 MIN: CPT | Performed by: FAMILY MEDICINE

## 2024-02-21 PROCEDURE — 3078F DIAST BP <80 MM HG: CPT | Performed by: FAMILY MEDICINE

## 2024-02-21 PROCEDURE — 3077F SYST BP >= 140 MM HG: CPT | Performed by: FAMILY MEDICINE

## 2024-02-21 ASSESSMENT — FIBROSIS 4 INDEX: FIB4 SCORE: 1.91

## 2024-02-22 RX ORDER — ACETAZOLAMIDE 125 MG/1
125 TABLET ORAL 3 TIMES DAILY
Qty: 160 TABLET | Refills: 1 | Status: SHIPPED | OUTPATIENT
Start: 2024-02-22

## 2024-02-23 PROBLEM — M89.9 BONE LESION: Status: ACTIVE | Noted: 2024-02-23

## 2024-02-23 ASSESSMENT — ENCOUNTER SYMPTOMS
CARDIOVASCULAR NEGATIVE: 1
GASTROINTESTINAL NEGATIVE: 1
CONSTITUTIONAL NEGATIVE: 1
PSYCHIATRIC NEGATIVE: 1
RESPIRATORY NEGATIVE: 1
NEUROLOGICAL NEGATIVE: 1
EYES NEGATIVE: 1

## 2024-02-23 NOTE — ASSESSMENT & PLAN NOTE
This looks to me to be a bone cyst but cannot rule out other lesions therefore we will go ahead and order an MRI as indicated by radiology.  Will follow-up afterwards.

## 2024-02-23 NOTE — ASSESSMENT & PLAN NOTE
Patient has some significant osteoarthritis to both knees.  Recommend that we try utilizing topical NSAIDs first then we will go ahead and try to use oral NSAIDs if that does not help and then lastly we can try steroid injections to each knee if needed.

## 2024-02-23 NOTE — PROGRESS NOTES
Subjective:   CC:   Chief Complaint   Patient presents with    Pain     Bilateral knee pain, and hip pain       HPI: Uday is a 73 y.o. male who presents today for the following problems:    Problem   Bone Lesion    On x-rays there was a small bone cyst/lytic lesion appreciated on the left knee.  Recommendation was for follow-up MRI.  Patient denies any pain in the area other than the usual arthritis pain that he has.     Pain in Both Knees    Mr. Osman comes in to see me today for multiple reasons  First reason is for bilateral knee pain.  Patient states he has had bilateral knee pain for some time denies really any trauma but states that he has had pain that has progressively gotten worse over the last number of years.  He currently is taking fish oils and it does from time to time take over-the-counter NSAIDs for his pain.  His PCP recently got an x-ray on him which showed mild to moderate arthritis.  He denies locking but states he does occasionally come close to giving away.         Current Outpatient Medications   Medication Sig Dispense Refill    atorvastatin (LIPITOR) 40 MG Tab TAKE 1 TABLET BY MOUTH EVERY  Tablet 3    Probiotic Product (PROBIOTIC DAILY PO) Take  by mouth.      Omega-3 Fatty Acids (FISH OIL) 1000 MG Cap capsule Take 1,000 mg by mouth every day.      Cholecalciferol (VITAMIN D3 PO) Take 1 Tablet by mouth every day.      psyllium (METAMUCIL) 58.12 % Pack Take 1 Packet by mouth every day.      cetirizine (ZYRTEC) 10 MG Tab Take 10 mg by mouth every day.      ascorbic acid (ASCORBIC ACID) 500 MG TABS Take 1,500 mg by mouth every day.      acetaZOLAMIDE (DIAMOX) 125 MG Tab Take 1 Tablet by mouth 3 times a day. 160 Tablet 1     No current facility-administered medications for this visit.       Social History     Tobacco Use    Smoking status: Former     Current packs/day: 0.00     Average packs/day: 1 pack/day for 25.3 years (25.3 ttl pk-yrs)     Types: Cigarettes     Start date:  "10/1/1964     Quit date: 1990     Years since quittin.1    Smokeless tobacco: Never   Vaping Use    Vaping Use: Never used   Substance Use Topics    Alcohol use: Yes     Comment: very occasional    Drug use: Yes     Types: Marijuana     Comment: occasional Cannabis edible rarely       Review of Systems   Constitutional: Negative.    HENT: Negative.     Eyes: Negative.    Respiratory: Negative.     Cardiovascular: Negative.    Gastrointestinal: Negative.    Skin: Negative.    Neurological: Negative.    Psychiatric/Behavioral: Negative.           Objective:     Vitals:    24 0907   BP: (!) 140/78   BP Location: Right arm   Patient Position: Sitting   Pulse: 80   Temp: 36.6 °C (97.8 °F)   TempSrc: Temporal   SpO2: 99%   Weight: 98.9 kg (218 lb)   Height: 1.74 m (5' 8.5\")     Body mass index is 32.66 kg/m².     Physical Exam  Vitals reviewed.   Constitutional:       Appearance: Normal appearance.   Cardiovascular:      Rate and Rhythm: Normal rate and regular rhythm.      Pulses: Normal pulses.      Heart sounds: Normal heart sounds. No murmur heard.     No friction rub. No gallop.   Pulmonary:      Effort: Pulmonary effort is normal. No respiratory distress.      Breath sounds: Normal breath sounds. No stridor. No wheezing, rhonchi or rales.   Abdominal:      General: Abdomen is flat.      Palpations: Abdomen is soft.   Musculoskeletal:      Cervical back: Normal range of motion and neck supple.      Comments: Knee  No effusion noted   No patellar grind or J sign   No patellar facet tenderness   No patellar apprehension sign   No ttp to the patellar tendon   Valgus stress is negative   Varus stress is negative   Lackman's is negative   Anterior drawer is negative   Jointline ttp is negative   McMurrays is negative   Thessaly's is negative     Skin:     General: Skin is warm and dry.   Neurological:      Mental Status: He is alert.          Assessment & Plan:   Pain in both knees  Patient has some " significant osteoarthritis to both knees.  Recommend that we try utilizing topical NSAIDs first then we will go ahead and try to use oral NSAIDs if that does not help and then lastly we can try steroid injections to each knee if needed.    Bone lesion  This looks to me to be a bone cyst but cannot rule out other lesions therefore we will go ahead and order an MRI as indicated by radiology.  Will follow-up afterwards.      Followup: Return in about 4 weeks (around 3/20/2024), or if symptoms worsen or fail to improve.    Jerel Mcgowan M.D.    Please note that this dictation was created using voice recognition software. I have made every reasonable attempt to correct obvious errors, but I expect that there are errors of grammar and possibly content that I did not discover before finalizing the note.

## 2024-02-26 ENCOUNTER — TELEPHONE (OUTPATIENT)
Dept: MEDICAL GROUP | Facility: OTHER | Age: 74
End: 2024-02-26
Payer: MEDICARE

## 2024-02-26 NOTE — TELEPHONE ENCOUNTER
Patient states imaging was entered for Right knee, he believes it is the left knee that needs the imaging, please advise Dr. Mcgowan thank you

## 2024-02-29 ENCOUNTER — TELEPHONE (OUTPATIENT)
Dept: MEDICAL GROUP | Facility: OTHER | Age: 74
End: 2024-02-29

## 2024-02-29 NOTE — TELEPHONE ENCOUNTER
He is calling stating he believes the MRI has been ordered on the incorrect knee as the lesion showing in the Radiology report is on his left knee.    Please contact him if changes need to be made as well as changing the order.    His MRI is on Monday and he is concerned about the copay if it is the incorrect kinee.    Thank you.

## 2024-03-04 ENCOUNTER — HOSPITAL ENCOUNTER (OUTPATIENT)
Dept: RADIOLOGY | Facility: MEDICAL CENTER | Age: 74
End: 2024-03-04
Attending: FAMILY MEDICINE
Payer: MEDICARE

## 2024-03-04 DIAGNOSIS — M89.9 BONE LESION: ICD-10-CM

## 2024-03-04 PROCEDURE — 73721 MRI JNT OF LWR EXTRE W/O DYE: CPT | Mod: LT

## 2024-03-08 ENCOUNTER — DOCUMENTATION (OUTPATIENT)
Dept: HEALTH INFORMATION MANAGEMENT | Facility: OTHER | Age: 74
End: 2024-03-08
Payer: MEDICARE

## 2024-03-18 ENCOUNTER — OFFICE VISIT (OUTPATIENT)
Dept: MEDICAL GROUP | Facility: CLINIC | Age: 74
End: 2024-03-18
Payer: MEDICARE

## 2024-03-18 VITALS
DIASTOLIC BLOOD PRESSURE: 73 MMHG | SYSTOLIC BLOOD PRESSURE: 132 MMHG | WEIGHT: 220 LBS | HEIGHT: 69 IN | TEMPERATURE: 97.4 F | BODY MASS INDEX: 32.58 KG/M2 | OXYGEN SATURATION: 96 % | HEART RATE: 71 BPM

## 2024-03-18 DIAGNOSIS — Z71.84 TRAVEL ADVICE ENCOUNTER: ICD-10-CM

## 2024-03-18 PROCEDURE — 99215 OFFICE O/P EST HI 40 MIN: CPT | Performed by: FAMILY MEDICINE

## 2024-03-18 PROCEDURE — 3078F DIAST BP <80 MM HG: CPT | Performed by: FAMILY MEDICINE

## 2024-03-18 PROCEDURE — 3075F SYST BP GE 130 - 139MM HG: CPT | Performed by: FAMILY MEDICINE

## 2024-03-18 RX ORDER — AZITHROMYCIN 500 MG/1
TABLET, FILM COATED ORAL
Qty: 3 TABLET | Refills: 0 | Status: SHIPPED | OUTPATIENT
Start: 2024-03-18

## 2024-03-18 RX ORDER — ONDANSETRON 4 MG/1
4 TABLET, ORALLY DISINTEGRATING ORAL EVERY 6 HOURS PRN
Qty: 10 TABLET | Refills: 0 | Status: SHIPPED | OUTPATIENT
Start: 2024-03-18

## 2024-03-18 ASSESSMENT — ENCOUNTER SYMPTOMS
PSYCHIATRIC NEGATIVE: 1
CARDIOVASCULAR NEGATIVE: 1
RESPIRATORY NEGATIVE: 1
NEUROLOGICAL NEGATIVE: 1
CONSTITUTIONAL NEGATIVE: 1
EYES NEGATIVE: 1
GASTROINTESTINAL NEGATIVE: 1

## 2024-03-18 ASSESSMENT — FIBROSIS 4 INDEX: FIB4 SCORE: 1.91

## 2024-03-18 NOTE — ASSESSMENT & PLAN NOTE
Vaccines needed -patient is up-to-date on all of his vaccines including typhoid.    Yellow fever not needed for this trip.    Vaccines given -no vaccines given today    Extensive discussion on travel risks and benefits conducted. [employer] handout given 40 minutes spent with patient discussing the [employer] handout    Malaria discussed no prophylaxis needed for this itinerary    Traveler's diarrhea discussed azithromycin and Zofran dispensed with directions on use    Insurance discussed -     Fu with travel clinic if there are any issues upon return.

## 2024-03-18 NOTE — PROGRESS NOTES
Subjective:   CC:   Chief Complaint   Patient presents with    Follow-Up     Left knee MRI follow up  Traveling to [place]       HPI: Uday is a 73 y.o. male who presents today for the following problems:    Problem   Travel Advice Encounter    Travel medicine clinic       Dates of travel are: For 2 weeks in May        Travel itinerary / top destinations   Patient is heading to [facility] in [place] and will be taking the train.      Reason for travel   Vacation      Planned activities     He is planning to go to [facility] and to stay off high but is taking the train there and back.  This is a luxury type of trip for him.  He will be in hotels.     Type of accommodations     Hotels    How many traveling with patient - type of trip (solo or with a guide/group)         Travel insurance/ rescue insurance patient does have travel insurance                  Current Outpatient Medications   Medication Sig Dispense Refill    azithromycin (ZITHROMAX) 500 MG tablet Take 1 tab po qd for travelers diarrhea 3 Tablet 0    ondansetron (ZOFRAN ODT) 4 MG TABLET DISPERSIBLE Take 1 Tablet by mouth every 6 hours as needed for Nausea/Vomiting. 10 Tablet 0    acetaZOLAMIDE (DIAMOX) 125 MG Tab Take 1 Tablet by mouth 3 times a day. 160 Tablet 1    atorvastatin (LIPITOR) 40 MG Tab TAKE 1 TABLET BY MOUTH EVERY  Tablet 3    Probiotic Product (PROBIOTIC DAILY PO) Take  by mouth.      Omega-3 Fatty Acids (FISH OIL) 1000 MG Cap capsule Take 1,000 mg by mouth every day.      Cholecalciferol (VITAMIN D3 PO) Take 1 Tablet by mouth every day.      psyllium (METAMUCIL) 58.12 % Pack Take 1 Packet by mouth every day.      cetirizine (ZYRTEC) 10 MG Tab Take 10 mg by mouth every day.      ascorbic acid (ASCORBIC ACID) 500 MG TABS Take 1,500 mg by mouth every day.       No current facility-administered medications for this visit.       Social History     Tobacco Use    Smoking status: Former     Current packs/day: 0.00     Average packs/day: 1  "pack/day for 25.3 years (25.3 ttl pk-yrs)     Types: Cigarettes     Start date: 10/1/1964     Quit date: 1990     Years since quittin.2    Smokeless tobacco: Never   Vaping Use    Vaping Use: Never used   Substance Use Topics    Alcohol use: Yes     Comment: very occasional    Drug use: Yes     Types: Marijuana     Comment: occasional Cannabis edible rarely       Review of Systems   Constitutional: Negative.    HENT: Negative.     Eyes: Negative.    Respiratory: Negative.     Cardiovascular: Negative.    Gastrointestinal: Negative.    Skin: Negative.    Neurological: Negative.    Psychiatric/Behavioral: Negative.           Objective:     Vitals:    24 0803   BP: 132/73   BP Location: Right arm   Patient Position: Sitting   Pulse: 71   Temp: 36.3 °C (97.4 °F)   TempSrc: Temporal   SpO2: 96%   Weight: 99.8 kg (220 lb)   Height: 1.74 m (5' 8.5\")     Body mass index is 32.96 kg/m².     Physical Exam  Vitals reviewed.   Constitutional:       Appearance: Normal appearance.   HENT:      Head: Normocephalic and atraumatic.   Neurological:      General: No focal deficit present.      Mental Status: He is alert and oriented to person, place, and time.   Psychiatric:         Mood and Affect: Mood normal.         Behavior: Behavior normal.          Assessment & Plan:   Travel advice encounter  Vaccines needed -patient is up-to-date on all of his vaccines including typhoid.    Yellow fever not needed for this trip.    Vaccines given -no vaccines given today    Extensive discussion on travel risks and benefits conducted. [employer] handout given 40 minutes spent with patient discussing the [employer] handout    Malaria discussed no prophylaxis needed for this itinerary    Traveler's diarrhea discussed azithromycin and Zofran dispensed with directions on use    Insurance discussed -     Fu with travel clinic if there are any issues upon return.         Followup: Return if symptoms worsen or fail to improve.    Jerel LORENZO" LINDA Mcgowan.    Please note that this dictation was created using voice recognition software. I have made every reasonable attempt to correct obvious errors, but I expect that there are errors of grammar and possibly content that I did not discover before finalizing the note.

## 2024-06-04 ENCOUNTER — APPOINTMENT (OUTPATIENT)
Dept: MEDICAL GROUP | Facility: MEDICAL CENTER | Age: 74
End: 2024-06-04
Payer: MEDICARE

## 2024-07-01 ENCOUNTER — TELEPHONE (OUTPATIENT)
Dept: CARDIOLOGY | Facility: MEDICAL CENTER | Age: 74
End: 2024-07-01
Payer: MEDICARE

## 2024-07-03 ENCOUNTER — APPOINTMENT (OUTPATIENT)
Dept: CARDIOLOGY | Facility: MEDICAL CENTER | Age: 74
End: 2024-07-03
Attending: INTERNAL MEDICINE
Payer: MEDICARE

## 2024-07-03 VITALS
HEART RATE: 81 BPM | OXYGEN SATURATION: 97 % | DIASTOLIC BLOOD PRESSURE: 72 MMHG | HEIGHT: 69 IN | RESPIRATION RATE: 14 BRPM | SYSTOLIC BLOOD PRESSURE: 140 MMHG | WEIGHT: 213 LBS | BODY MASS INDEX: 31.55 KG/M2

## 2024-07-03 DIAGNOSIS — Z76.89 ENCOUNTER TO ESTABLISH CARE: ICD-10-CM

## 2024-07-03 DIAGNOSIS — R06.02 SOB (SHORTNESS OF BREATH): ICD-10-CM

## 2024-07-03 DIAGNOSIS — R06.09 DOE (DYSPNEA ON EXERTION): ICD-10-CM

## 2024-07-03 DIAGNOSIS — E78.2 MIXED HYPERLIPIDEMIA: ICD-10-CM

## 2024-07-03 LAB — EKG IMPRESSION: NORMAL

## 2024-07-03 PROCEDURE — 99204 OFFICE O/P NEW MOD 45 MIN: CPT | Performed by: INTERNAL MEDICINE

## 2024-07-03 PROCEDURE — 3077F SYST BP >= 140 MM HG: CPT | Performed by: INTERNAL MEDICINE

## 2024-07-03 PROCEDURE — 93010 ELECTROCARDIOGRAM REPORT: CPT | Performed by: INTERNAL MEDICINE

## 2024-07-03 PROCEDURE — 99212 OFFICE O/P EST SF 10 MIN: CPT | Performed by: INTERNAL MEDICINE

## 2024-07-03 PROCEDURE — 93005 ELECTROCARDIOGRAM TRACING: CPT | Performed by: INTERNAL MEDICINE

## 2024-07-03 PROCEDURE — 3078F DIAST BP <80 MM HG: CPT | Performed by: INTERNAL MEDICINE

## 2024-07-03 PROCEDURE — 99202 OFFICE O/P NEW SF 15 MIN: CPT | Performed by: INTERNAL MEDICINE

## 2024-07-03 ASSESSMENT — ENCOUNTER SYMPTOMS
DIZZINESS: 0
CLAUDICATION: 0
VOMITING: 0
ABDOMINAL PAIN: 0
SHORTNESS OF BREATH: 1
BLURRED VISION: 0
CARDIOVASCULAR NEGATIVE: 1
FEVER: 0
HEADACHES: 0
NERVOUS/ANXIOUS: 0
PALPITATIONS: 0
COUGH: 1
PSYCHIATRIC NEGATIVE: 1
NAUSEA: 0
MYALGIAS: 0
DEPRESSION: 0
CONSTITUTIONAL NEGATIVE: 1
NEUROLOGICAL NEGATIVE: 1
DOUBLE VISION: 0
WEAKNESS: 0
BRUISES/BLEEDS EASILY: 0
GASTROINTESTINAL NEGATIVE: 1
EYES NEGATIVE: 1
CHILLS: 0
WEIGHT LOSS: 0
FOCAL WEAKNESS: 0

## 2024-07-03 ASSESSMENT — FIBROSIS 4 INDEX: FIB4 SCORE: 1.91

## 2024-07-05 ENCOUNTER — HOSPITAL ENCOUNTER (OUTPATIENT)
Dept: RADIOLOGY | Facility: MEDICAL CENTER | Age: 74
End: 2024-07-05
Attending: INTERNAL MEDICINE
Payer: COMMERCIAL

## 2024-07-05 DIAGNOSIS — E78.2 MIXED HYPERLIPIDEMIA: ICD-10-CM

## 2024-07-05 PROCEDURE — 4410556 CT-CARDIAC SCORING (SELF PAY ONLY)

## 2024-07-08 ENCOUNTER — APPOINTMENT (RX ONLY)
Dept: URBAN - METROPOLITAN AREA CLINIC 4 | Facility: CLINIC | Age: 74
Setting detail: DERMATOLOGY
End: 2024-07-08

## 2024-07-08 ENCOUNTER — PATIENT MESSAGE (OUTPATIENT)
Dept: CARDIOLOGY | Facility: MEDICAL CENTER | Age: 74
End: 2024-07-08
Payer: MEDICARE

## 2024-07-08 DIAGNOSIS — L82.1 OTHER SEBORRHEIC KERATOSIS: ICD-10-CM

## 2024-07-08 DIAGNOSIS — Z71.89 OTHER SPECIFIED COUNSELING: ICD-10-CM

## 2024-07-08 DIAGNOSIS — D49.2 NEOPLASM OF UNSPECIFIED BEHAVIOR OF BONE, SOFT TISSUE, AND SKIN: ICD-10-CM

## 2024-07-08 DIAGNOSIS — D22 MELANOCYTIC NEVI: ICD-10-CM

## 2024-07-08 PROBLEM — D22.72 MELANOCYTIC NEVI OF LEFT LOWER LIMB, INCLUDING HIP: Status: ACTIVE | Noted: 2024-07-08

## 2024-07-08 PROBLEM — D22.71 MELANOCYTIC NEVI OF RIGHT LOWER LIMB, INCLUDING HIP: Status: ACTIVE | Noted: 2024-07-08

## 2024-07-08 PROBLEM — D48.5 NEOPLASM OF UNCERTAIN BEHAVIOR OF SKIN: Status: ACTIVE | Noted: 2024-07-08

## 2024-07-08 PROCEDURE — 99203 OFFICE O/P NEW LOW 30 MIN: CPT | Mod: 25

## 2024-07-08 PROCEDURE — ? BIOPSY BY SHAVE METHOD

## 2024-07-08 PROCEDURE — 11102 TANGNTL BX SKIN SINGLE LES: CPT

## 2024-07-08 PROCEDURE — ? DEFER

## 2024-07-08 PROCEDURE — ? SUNSCREEN RECOMMENDATIONS

## 2024-07-08 PROCEDURE — ? COUNSELING

## 2024-07-08 ASSESSMENT — LOCATION SIMPLE DESCRIPTION DERM
LOCATION SIMPLE: RIGHT UPPER BACK
LOCATION SIMPLE: RIGHT PRETIBIAL REGION
LOCATION SIMPLE: POSTERIOR SCALP
LOCATION SIMPLE: LEFT PRETIBIAL REGION

## 2024-07-08 ASSESSMENT — LOCATION DETAILED DESCRIPTION DERM
LOCATION DETAILED: RIGHT INFERIOR UPPER BACK
LOCATION DETAILED: POSTERIOR MID-PARIETAL SCALP
LOCATION DETAILED: RIGHT PROXIMAL PRETIBIAL REGION
LOCATION DETAILED: LEFT PROXIMAL PRETIBIAL REGION

## 2024-07-08 ASSESSMENT — LOCATION ZONE DERM
LOCATION ZONE: TRUNK
LOCATION ZONE: SCALP
LOCATION ZONE: LEG

## 2024-07-25 ENCOUNTER — HOSPITAL ENCOUNTER (OUTPATIENT)
Dept: RADIOLOGY | Facility: MEDICAL CENTER | Age: 74
End: 2024-07-25
Attending: INTERNAL MEDICINE
Payer: MEDICARE

## 2024-07-25 ENCOUNTER — HOSPITAL ENCOUNTER (OUTPATIENT)
Dept: CARDIOLOGY | Facility: MEDICAL CENTER | Age: 74
End: 2024-07-25
Attending: INTERNAL MEDICINE
Payer: MEDICARE

## 2024-07-25 DIAGNOSIS — R06.09 DOE (DYSPNEA ON EXERTION): ICD-10-CM

## 2024-07-25 DIAGNOSIS — R06.02 SOB (SHORTNESS OF BREATH): ICD-10-CM

## 2024-07-25 PROCEDURE — A9502 TC99M TETROFOSMIN: HCPCS

## 2024-07-25 PROCEDURE — 93306 TTE W/DOPPLER COMPLETE: CPT

## 2024-07-26 ENCOUNTER — PATIENT MESSAGE (OUTPATIENT)
Dept: CARDIOLOGY | Facility: MEDICAL CENTER | Age: 74
End: 2024-07-26
Payer: MEDICARE

## 2024-07-26 LAB
LV EJECT FRACT  99904: 60
LV EJECT FRACT MOD 2C 99903: 57.34
LV EJECT FRACT MOD 4C 99902: 63.58
LV EJECT FRACT MOD BP 99901: 61.22

## 2024-07-26 PROCEDURE — 93306 TTE W/DOPPLER COMPLETE: CPT | Mod: 26 | Performed by: INTERNAL MEDICINE

## 2024-07-26 PROCEDURE — 78452 HT MUSCLE IMAGE SPECT MULT: CPT | Mod: 26 | Performed by: INTERNAL MEDICINE

## 2024-07-26 PROCEDURE — 93018 CV STRESS TEST I&R ONLY: CPT | Performed by: INTERNAL MEDICINE

## 2024-07-30 ENCOUNTER — PATIENT MESSAGE (OUTPATIENT)
Dept: MEDICAL GROUP | Facility: MEDICAL CENTER | Age: 74
End: 2024-07-30

## 2024-07-30 ENCOUNTER — APPOINTMENT (OUTPATIENT)
Dept: MEDICAL GROUP | Facility: MEDICAL CENTER | Age: 74
End: 2024-07-30
Payer: MEDICARE

## 2024-08-01 ENCOUNTER — APPOINTMENT (OUTPATIENT)
Dept: MEDICAL GROUP | Facility: MEDICAL CENTER | Age: 74
End: 2024-08-01
Payer: MEDICARE

## 2024-08-01 VITALS — WEIGHT: 210 LBS | BODY MASS INDEX: 31.83 KG/M2 | HEIGHT: 68 IN

## 2024-08-01 DIAGNOSIS — R06.09 EXERTIONAL DYSPNEA: ICD-10-CM

## 2024-08-01 PROCEDURE — 99213 OFFICE O/P EST LOW 20 MIN: CPT | Performed by: STUDENT IN AN ORGANIZED HEALTH CARE EDUCATION/TRAINING PROGRAM

## 2024-08-01 ASSESSMENT — FIBROSIS 4 INDEX: FIB4 SCORE: 1.91

## 2024-08-01 NOTE — PROGRESS NOTES
Virtual Visit: Established Patient   This visit was conducted via Zoom using secure and encrypted videoconferencing technology.   The patient was in their home in the [facility].    The patient's identity was confirmed and verbal consent was obtained for this virtual visit.    Subjective:   CC:   Chief Complaint   Patient presents with    Shortness of Breath     X 1 year. Pt hikes a lot and has noticed he'd been stopping more often and he gasps for air.  Pt requests a pulmonology referral     Uday Osman is a 73 y.o. male presenting for evaluation and management of:    Verbal consent was acquired by the patient to use [employer] ambient listening note generation during this visit Yes   History of Present Illness  The patient is a 73-year-old male who presents for follow-up for exertional dyspnea. He has already consulted with a cardiologist and undergone an echocardiogram and other work-up, all of which were negative for cardiac causes. His cardiologist recommended a follow-up with a pulmonologist for further evaluation to rule out any lung-related causes. His labs have been reviewed, and his electrolytes have been within the normal range. He also had a stress test done, which was also normal. He has a history of tobacco use and COVID-19.    He has not undergone any imaging of his chest.    Results  Laboratory Studies  Hemoglobin levels are normal, 16.2. Hematocrit 47.8. Electrolytes have been within normal range.    Imaging  Echocardiogram and other work-ups were negative for cardiac etiologies.    Testing  Stress test was normal.       ROS   Review of systems (+10 systems) unremarkable except for concerns noted by patient or items listed.    Please see HPI for additional ROS.      Current medicines (including changes today)  Current Outpatient Medications   Medication Sig Dispense Refill    atorvastatin (LIPITOR) 40 MG Tab TAKE 1 TABLET BY MOUTH EVERY  Tablet 3    Probiotic Product (PROBIOTIC  "DAILY PO) Take  by mouth.      Omega-3 Fatty Acids (FISH OIL) 1000 MG Cap capsule Take 1,000 mg by mouth every day.      Cholecalciferol (VITAMIN D3 PO) Take 1 Tablet by mouth every day.      psyllium (METAMUCIL) 58.12 % Pack Take 1 Packet by mouth every day.      cetirizine (ZYRTEC) 10 MG Tab Take 10 mg by mouth every day.      ascorbic acid (ASCORBIC ACID) 500 MG TABS Take 1,500 mg by mouth every day.       No current facility-administered medications for this visit.       Patient Active Problem List    Diagnosis Date Noted    SOB (shortness of breath) 07/03/2024    HOFF (dyspnea on exertion) 07/03/2024    Bone lesion 02/23/2024    Pain in both knees 02/08/2024    Travel advice encounter 02/08/2024    BMI 32.0-32.9,adult 05/22/2023    Urinary retention 08/30/2022    Mixed hyperlipidemia 03/21/2018        Objective:   Ht 1.727 m (5' 8\") Comment: Pt reported  Wt 95.3 kg (210 lb) Comment: Pt reported  BMI 31.93 kg/m²     Physical Exam:  Constitutional: Alert, no distress, well-groomed.  Skin: No rashes in visible areas.  Eye: Round. Conjunctiva clear, lids normal. No icterus.   ENMT: Lips pink without lesions, good dentition, moist mucous membranes. Phonation normal.  Neck: No masses, no thyromegaly. Moves freely without pain.  Respiratory: Unlabored respiratory effort, no cough or audible wheeze  Psych: Alert and oriented x3, normal affect and mood.     Assessment and Plan:   The following treatment plan was discussed:     1. Exertional dyspnea  - Referral to Pulmonary and Sleep Medicine  - CT-CHEST (THORAX) W/O; Future    Chronic, uncontrolled   > 1 year exertional dyspnea   Cardiac work up negative   No history of asthma , COPD   No cough , weight loss , wheezing  History of smoking present   History of COVID infection present   Plan:  CT chest w/o   Referral to pulmonology   Can consider PFTs if delay in appointment   - Referral to Pulmonary and Sleep Medicine  - CT-CHEST (THORAX) W/O; Future      Follow-up: " Return in about 6 months (around 2/1/2025), or if symptoms worsen or fail to improve.

## 2024-08-21 ENCOUNTER — HOSPITAL ENCOUNTER (OUTPATIENT)
Dept: RADIOLOGY | Facility: MEDICAL CENTER | Age: 74
End: 2024-08-21
Attending: STUDENT IN AN ORGANIZED HEALTH CARE EDUCATION/TRAINING PROGRAM
Payer: MEDICARE

## 2024-08-21 DIAGNOSIS — R06.09 EXERTIONAL DYSPNEA: ICD-10-CM

## 2024-08-21 PROCEDURE — 71250 CT THORAX DX C-: CPT

## 2024-09-02 RX ADMIN — CEPHALEXIN: 500 CAPSULE ORAL at 00:00

## 2024-09-03 ENCOUNTER — APPOINTMENT (RX ONLY)
Dept: URBAN - METROPOLITAN AREA CLINIC 36 | Facility: CLINIC | Age: 74
Setting detail: DERMATOLOGY
End: 2024-09-03

## 2024-09-03 PROBLEM — C44.319 BASAL CELL CARCINOMA OF SKIN OF OTHER PARTS OF FACE: Status: ACTIVE | Noted: 2024-09-03

## 2024-09-03 PROCEDURE — 17311 MOHS 1 STAGE H/N/HF/G: CPT

## 2024-09-03 PROCEDURE — 17312 MOHS ADDL STAGE: CPT

## 2024-09-03 PROCEDURE — 13132 CMPLX RPR F/C/C/M/N/AX/G/H/F: CPT

## 2024-09-03 PROCEDURE — ? PRESCRIPTION

## 2024-09-03 PROCEDURE — ? MOHS SURGERY

## 2024-09-03 RX ORDER — CEPHALEXIN 500 MG/1
CAPSULE ORAL
Qty: 21 | Refills: 0 | Status: ERX | COMMUNITY
Start: 2024-09-02

## 2024-09-03 NOTE — PROCEDURE: MOHS SURGERY
Asc Procedure Text (E): After obtaining clear surgical margins the patient was sent to an ASC for surgical repair.  The patient understands they will receive post-surgical care and follow-up from the ASC physician.
Stage 10: Number Of Blocks?: 0
Suture Removal: 8 days
Suturegard Body: The suture ends were repeatedly re-tightened and re-clamped to achieve the desired tissue expansion.
Cheiloplasty (Less Than 50%) Text: A decision was made to reconstruct the defect with a  cheiloplasty.  The defect was undermined extensively.  Additional obicularis oris muscle was excised with a 15 blade scalpel.  The defect was converted into a full thickness wedge, of less than 50% of the vertical height of the lip, to facilite a better cosmetic result.  Small vessels were then tied off with 5-0 monocyrl. The obicularis oris, superficial fascia, adipose and dermis were then reapproximated.  After the deeper layers were approximated the epidermis was reapproximated with particular care given to realign the vermilion border.
Banner Transposition Flap Text: The defect edges were debeveled with a #15 scalpel blade.  Given the location of the defect and the proximity to free margins a Banner transposition flap was deemed most appropriate.  Using a sterile surgical marker, an appropriate flap drawn around the defect. The area thus outlined was incised deep to adipose tissue with a #15 scalpel blade.  The skin margins were undermined to an appropriate distance in all directions utilizing iris scissors.
Area L Indication Text: Tumors in this location are included in Area L (trunk and extremities).  Mohs surgery is indicated for larger tumors, or tumors with aggressive histologic features, in these anatomic locations.
Biopsy Photograph Reviewed: Yes
Provider Procedure Text (D): After obtaining clear surgical margins the defect was repaired by another provider.
Ear Wedge Repair Text: A wedge excision was completed by carrying down an excision through the full thickness of the ear and cartilage with an inward facing Burow's triangle. The wound was then closed in a layered fashion.
Wound Care (No Sutures): Petrolatum
Pain Refusal Text: I offered to prescribe pain medication but the patient refused to take this medication.
Direct Brow Lift Performed? (Cpt Must Be Added Manually If Warranted): No
Repair Hemostasis (Optional): Pinpoint electrocautery
Wound Care: Vaseline
Oculoplastic Surgeon Procedure Text (B): After obtaining clear surgical margins the patient was sent to oculoplastics for surgical repair.  The patient understands they will receive post-surgical care and follow-up from the referring physician's office.
Stage 2: Number Of Blocks?: 1
Brow Lift Text: A midfrontal incision was made medially to the defect to allow access to the tissues just superior to the left eyebrow. Following careful dissection inferiorly in a supraperiosteal plane to the level of the left eyebrow, several 3-0 monocryl sutures were used to resuspend the eyebrow orbicularis oculi muscular unit to the superior frontal bone periosteum. This resulted in an appropriate reapproximation of static eyebrow symmetry and correction of the left brow ptosis.
Wound Check: 6 weeks
Initial Size Of Lesion: 0.5
O-Z Flap Text: The defect edges were debeveled with a #15 scalpel blade.  Given the location of the defect, shape of the defect and the proximity to free margins an O-Z flap was deemed most appropriate.  Using a sterile surgical marker, an appropriate transposition flap was drawn incorporating the defect and placing the expected incisions within the relaxed skin tension lines where possible. The area thus outlined was incised deep to adipose tissue with a #15 scalpel blade.  The skin margins were undermined to an appropriate distance in all directions utilizing iris scissors.
Anesthesia Volume In Cc: 6
Eye Protection Verbiage: Before proceeding with the stage, a plastic scleral shield was inserted. The globe was anesthetized with a few drops of 1% lidocaine with 1:100,000 epinephrine. Then, an appropriate sized scleral shield was chosen and coated with lacrilube ointment. The shield was gently inserted and left in place for the duration of each stage. After the stage was completed, the shield was gently removed.
Otolaryngologist Procedure Text (E): After obtaining clear surgical margins the patient was sent to otolaryngology for surgical repair.  The patient understands they will receive post-surgical care and follow-up from the referring physician's office.
Home Suture Removal Text: Patient was provided instructions on removing sutures and will remove their sutures at home.  If they have any questions or difficulties they will call the office.
Stage 4: Additional Anesthesia Type: 1% lidocaine with epinephrine
A-T Advancement Flap Text: The defect edges were debeveled with a #15 scalpel blade.  Given the location of the defect, shape of the defect and the proximity to free margins an A-T advancement flap was deemed most appropriate.  Using a sterile surgical marker, an appropriate advancement flap was drawn incorporating the defect and placing the expected incisions within the relaxed skin tension lines where possible.    The area thus outlined was incised deep to adipose tissue with a #15 scalpel blade.  The skin margins were undermined to an appropriate distance in all directions utilizing iris scissors.
Closure 3 Information: This tab is for additional flaps and grafts above and beyond our usual structured repairs.  Please note if you enter information here it will not currently bill and you will need to add the billing information manually.
Presence Of Scar Tissue (For Histology): present
Special Stains Stage 4 - Results: Base On Clearance Noted Above
Suturegard Retention Suture: 0-0 Nylon
Composite Graft Text: The defect edges were debeveled with a #15 scalpel blade.  Given the location of the defect, shape of the defect, the proximity to free margins and the fact the defect was full thickness a composite graft was deemed most appropriate.  The defect was outline and then transferred to the donor site.  A full thickness graft was then excised from the donor site. The graft was then placed in the primary defect, oriented appropriately and then sutured into place.  The secondary defect was then repaired using a primary closure.
Hemigard Postcare Instructions: The HEMIGARD strips are to remain completely dry for at least 5-7 days.
Double M-Plasty Intermediate Repair Preamble Text (Leave Blank If You Do Not Want): Undermining was performed with blunt dissection.
Plastic Surgeon Procedure Text (A): After obtaining clear surgical margins the patient was sent to plastics for surgical repair.  The patient understands they will receive post-surgical care and follow-up from the referring physician's office.
Posterior Auricular Interpolation Flap Text: A decision was made to reconstruct the defect utilizing an interpolation axial flap and a staged reconstruction.  A telfa template was made of the defect.  This telfa template was then used to outline the posterior auricular interpolation flap.  The donor area for the pedicle flap was then injected with anesthesia.  The flap was excised through the skin and subcutaneous tissue down to the layer of the underlying musculature.  The pedicle flap was carefully excised within this deep plane to maintain its blood supply.  The edges of the donor site were undermined.   The donor site was closed in a primary fashion.  The pedicle was then rotated into position and sutured.  Once the tube was sutured into place, adequate blood supply was confirmed with blanching and refill.  The pedicle was then wrapped with xeroform gauze and dressed appropriately with a telfa and gauze bandage to ensure continued blood supply and protect the attached pedicle.
Epidermal Autograft Text: The defect edges were debeveled with a #15 scalpel blade.  Given the location of the defect, shape of the defect and the proximity to free margins an epidermal autograft was deemed most appropriate.  Using a sterile surgical marker, the primary defect shape was transferred to the donor site. The epidermal graft was then harvested.  The skin graft was then placed in the primary defect and oriented appropriately.
H Plasty Text: Given the location of the defect, shape of the defect and the proximity to free margins a H-plasty was deemed most appropriate for repair.  Using a sterile surgical marker, the appropriate advancement arms of the H-plasty were drawn incorporating the defect and placing the expected incisions within the relaxed skin tension lines where possible. The area thus outlined was incised deep to adipose tissue with a #15 scalpel blade. The skin margins were undermined to an appropriate distance in all directions utilizing iris scissors.  The opposing advancement arms were then advanced into place in opposite direction and anchored with interrupted buried subcutaneous sutures.
Double Island Pedicle Flap Text: The defect edges were debeveled with a #15 scalpel blade.  Given the location of the defect, shape of the defect and the proximity to free margins a double island pedicle advancement flap was deemed most appropriate.  Using a sterile surgical marker, an appropriate advancement flap was drawn incorporating the defect, outlining the appropriate donor tissue and placing the expected incisions within the relaxed skin tension lines where possible.    The area thus outlined was incised deep to adipose tissue with a #15 scalpel blade.  The skin margins were undermined to an appropriate distance in all directions around the primary defect and laterally outward around the island pedicle utilizing iris scissors.  There was minimal undermining beneath the pedicle flap.
Donor Site Anesthesia Type: same as repair anesthesia
Staged Advancement Flap Text: The defect edges were debeveled with a #15 scalpel blade.  Given the location of the defect, shape of the defect and the proximity to free margins a staged advancement flap was deemed most appropriate.  Using a sterile surgical marker, an appropriate advancement flap was drawn incorporating the defect and placing the expected incisions within the relaxed skin tension lines where possible. The area thus outlined was incised deep to adipose tissue with a #15 scalpel blade.  The skin margins were undermined to an appropriate distance in all directions utilizing iris scissors.
Ftsg Text: The defect edges were debeveled with a #15 scalpel blade.  Given the location of the defect, shape of the defect and the proximity to free margins a full thickness skin graft was deemed most appropriate.  Using a sterile surgical marker, the primary defect shape was transferred to the donor site. The area thus outlined was incised deep to adipose tissue with a #15 scalpel blade.  The harvested graft was then trimmed of adipose tissue until only dermis and epidermis was left.  The skin margins of the secondary defect were undermined to an appropriate distance in all directions utilizing iris scissors.  The secondary defect was closed with interrupted buried subcutaneous sutures.  The skin edges were then re-apposed with running  sutures.  The skin graft was then placed in the primary defect and oriented appropriately.
Consent 1/Introductory Paragraph: The rationale for Mohs was explained to the patient and consent was obtained. The risks, benefits and alternatives to therapy were discussed in detail. Specifically, the risks of infection, scarring, bleeding, prolonged wound healing, incomplete removal, allergy to anesthesia, nerve injury and recurrence were addressed. Prior to the procedure, the treatment site was clearly identified and confirmed by the patient. All components of Universal Protocol/PAUSE Rule completed.
Ear Star Wedge Flap Text: The defect edges were debeveled with a #15 blade scalpel.  Given the location of the defect and the proximity to free margins (helical rim) an ear star wedge flap was deemed most appropriate.  Using a sterile surgical marker, the appropriate flap was drawn incorporating the defect and placing the expected incisions between the helical rim and antihelix where possible.  The area thus outlined was incised through and through with a #15 scalpel blade.
Island Pedicle Flap Text: The defect edges were debeveled with a #15 scalpel blade.  Given the location of the defect, shape of the defect and the proximity to free margins an island pedicle advancement flap was deemed most appropriate.  Using a sterile surgical marker, an appropriate advancement flap was drawn incorporating the defect, outlining the appropriate donor tissue and placing the expected incisions within the relaxed skin tension lines where possible.    The area thus outlined was incised deep to adipose tissue with a #15 scalpel blade.  The skin margins were undermined to an appropriate distance in all directions around the primary defect and laterally outward around the island pedicle utilizing iris scissors.  There was minimal undermining beneath the pedicle flap.
Consent (Spinal Accessory)/Introductory Paragraph: The rationale for Mohs was explained to the patient and consent was obtained. The risks, benefits and alternatives to therapy were discussed in detail. Specifically, the risks of damage to the spinal accessory nerve, infection, scarring, bleeding, prolonged wound healing, incomplete removal, allergy to anesthesia, and recurrence were addressed. Prior to the procedure, the treatment site was clearly identified and confirmed by the patient. All components of Universal Protocol/PAUSE Rule completed.
Repair Anesthesia Method: local infiltration
Complex Repair And Graft Additional Text (Will Appearing After The Standard Complex Repair Text): The complex repair was not sufficient to completely close the primary defect. The remaining additional defect was repaired with the graft mentioned below.
Purse String (Intermediate) Text: Given the location of the defect and the characteristics of the surrounding skin a purse string intermediate closure was deemed most appropriate.  Undermining was performed circumfirentially around the surgical defect.  A purse string suture was then placed and tightened.
Mucosal Advancement Flap Text: Given the location of the defect, shape of the defect and the proximity to free margins a mucosal advancement flap was deemed most appropriate. Incisions were made with a 15 blade scalpel in the appropriate fashion along the cutaneous vermilion border and the mucosal lip. The remaining actinically damaged mucosal tissue was excised.  The mucosal advancement flap was then elevated to the gingival sulcus with care taken to preserve the neurovascular structures and advanced into the primary defect. Care was taken to ensure that precise realignment of the vermilion border was achieved.
Partial Purse String (Simple) Text: Given the location of the defect and the characteristics of the surrounding skin a simple purse string closure was deemed most appropriate.  Undermining was performed circumfirentially around the surgical defect.  A purse string suture was then placed and tightened. Wound tension only allowed a partial closure of the circular defect.
Width Of Defect Perpendicular To Closure In Cm (Required): 0.8
Eyelid Full Thickness Repair - 24816: The eyelid defect was full thickness which required a wedge repair of the eyelid. Special care was taken to ensure that the eyelid margin was realligned when placing sutures.
Z Plasty Text: The lesion was extirpated to the level of the fat with a #15 scalpel blade.  Given the location of the defect, shape of the defect and the proximity to free margins a Z-plasty was deemed most appropriate for repair.  Using a sterile surgical marker, the appropriate transposition arms of the Z-plasty were drawn incorporating the defect and placing the expected incisions within the relaxed skin tension lines where possible.    The area thus outlined was incised deep to adipose tissue with a #15 scalpel blade.  The skin margins were undermined to an appropriate distance in all directions utilizing iris scissors.  The opposing transposition arms were then transposed into place in opposite direction and anchored with interrupted buried subcutaneous sutures.
Surgical Defect Width In Cm (Optional): 1.0
Consent (Marginal Mandibular)/Introductory Paragraph: The rationale for Mohs was explained to the patient and consent was obtained. The risks, benefits and alternatives to therapy were discussed in detail. Specifically, the risks of damage to the marginal mandibular branch of the facial nerve, infection, scarring, bleeding, prolonged wound healing, incomplete removal, allergy to anesthesia, and recurrence were addressed. Prior to the procedure, the treatment site was clearly identified and confirmed by the patient. All components of Universal Protocol/PAUSE Rule completed.
Intermediate Repair And Graft Additional Text (Will Appearing After The Standard Complex Repair Text): The intermediate repair was not sufficient to completely close the primary defect. The remaining additional defect was repaired with the graft mentioned below.
Tarsorrhaphy Text: A tarsorrhaphy was performed using Frost sutures.
Consent 2/Introductory Paragraph: Mohs surgery was explained to the patient and consent was obtained. The risks, benefits and alternatives to therapy were discussed in detail. Specifically, the risks of infection, scarring, bleeding, prolonged wound healing, incomplete removal, allergy to anesthesia, nerve injury and recurrence were addressed. Prior to the procedure, the treatment site was clearly identified and confirmed by the patient. All components of Universal Protocol/PAUSE Rule completed.
Mid-Level Procedure Text (F): After obtaining clear surgical margins the patient was sent to a mid-level provider for surgical repair.  The patient understands they will receive post-surgical care and follow-up from the mid-level provider.
Burow's Advancement Flap Text: The defect edges were debeveled with a #15 scalpel blade.  Given the location of the defect and the proximity to free margins a Burow's advancement flap was deemed most appropriate.  Using a sterile surgical marker, the appropriate advancement flap was drawn incorporating the defect and placing the expected incisions within the relaxed skin tension lines where possible.    The area thus outlined was incised deep to adipose tissue with a #15 scalpel blade.  The skin margins were undermined to an appropriate distance in all directions utilizing iris scissors.
Graft Donor Site Epidermal Sutures (Optional): 5-0 Surgipro
Suturegard Intro: Intraoperative tissue expansion was performed, utilizing the SUTUREGARD device, in order to reduce wound tension.
Was The Patient On Physician Recommended Anticoagulation Therapy?: Please Select the Appropriate Response
Burow's Graft Text: The defect edges were debeveled with a #15 scalpel blade.  Given the location of the defect, shape of the defect, the proximity to free margins and the presence of a standing cone deformity a Burow's skin graft was deemed most appropriate. The standing cone was removed and this tissue was then trimmed to the shape of the primary defect. The adipose tissue was also removed until only dermis and epidermis were left.  The skin margins of the secondary defect were undermined to an appropriate distance in all directions utilizing iris scissors.  The secondary defect was closed with interrupted buried subcutaneous sutures.  The skin edges were then re-apposed with running  sutures.  The skin graft was then placed in the primary defect and oriented appropriately.
Epidermal Closure: running cuticular
Full Thickness Lip Wedge Repair (Flap) Text: Given the location of the defect and the proximity to free margins a full thickness wedge repair was deemed most appropriate.  Using a sterile surgical marker, the appropriate repair was drawn incorporating the defect and placing the expected incisions perpendicular to the vermilion border.  The vermilion border was also meticulously outlined to ensure appropriate reapproximation during the repair.  The area thus outlined was incised through and through with a #15 scalpel blade.  The muscularis and dermis were reaproximated with deep sutures following hemostasis. Care was taken to realign the vermilion border before proceeding with the superficial closure.  Once the vermilion was realigned the superfical and mucosal closure was finished.
Abbe Flap (Upper To Lower Lip) Text: The defect of the lower lip was assessed and measured.  Given the location and size of the defect, an Abbe flap was deemed most appropriate.  Using a sterile surgical marker, an appropriate Abbe flap was measured and drawn on the upper lip. Local anesthesia was then infiltrated.  A scalpel was then used to incise the upper lip through and through the skin, vermilion, muscle and mucosa, leaving the flap pedicled on the opposite side.  The flap was then rotated and transferred to the lower lip defect.  The flap was then sutured into place with a three layer technique, closing the orbicularis oris muscle layer with subcutaneous buried sutures, followed by a mucosal layer and an epidermal layer.
Inflammation Suggestive Of Cancer Camouflage Histology Text: There was a dense lymphocytic infiltrate which prevented adequate histologic evaluation of adjacent structures.
Vermilion Border Text: The closure involved the vermilion border.
Xenograft Text: The defect edges were debeveled with a #15 scalpel blade.  Given the location of the defect, shape of the defect and the proximity to free margins a xenograft was deemed most appropriate.  The graft was then trimmed to fit the size of the defect.  The graft was then placed in the primary defect and oriented appropriately.
Cheek Interpolation Flap Text: A decision was made to reconstruct the defect utilizing an interpolation axial flap and a staged reconstruction.  A telfa template was made of the defect.  This telfa template was then used to outline the Cheek Interpolation flap.  The donor area for the pedicle flap was then injected with anesthesia.  The flap was excised through the skin and subcutaneous tissue down to the layer of the underlying musculature.  The interpolation flap was carefully excised within this deep plane to maintain its blood supply.  The edges of the donor site were undermined.   The donor site was closed in a primary fashion.  The pedicle was then rotated into position and sutured.  Once the tube was sutured into place, adequate blood supply was confirmed with blanching and refill.  The pedicle was then wrapped with xeroform gauze and dressed appropriately with a telfa and gauze bandage to ensure continued blood supply and protect the attached pedicle.
Same Histology In Subsequent Stages Text: The pattern and morphology of the tumor is as described in the first stage.
Cheek-To-Nose Interpolation Flap Text: A decision was made to reconstruct the defect utilizing an interpolation axial flap and a staged reconstruction.  A telfa template was made of the defect.  This telfa template was then used to outline the Cheek-To-Nose Interpolation flap.  The donor area for the pedicle flap was then injected with anesthesia.  The flap was excised through the skin and subcutaneous tissue down to the layer of the underlying musculature.  The interpolation flap was carefully excised within this deep plane to maintain its blood supply.  The edges of the donor site were undermined.   The donor site was closed in a primary fashion.  The pedicle was then rotated into position and sutured.  Once the tube was sutured into place, adequate blood supply was confirmed with blanching and refill.  The pedicle was then wrapped with xeroform gauze and dressed appropriately with a telfa and gauze bandage to ensure continued blood supply and protect the attached pedicle.
Postop Diagnosis: same
M-Plasty Complex Repair Preamble Text (Leave Blank If You Do Not Want): Extensive wide undermining was performed.
Dressing: pressure dressing with telfa
Rhomboid Transposition Flap Text: The defect edges were debeveled with a #15 scalpel blade.  Given the location of the defect and the proximity to free margins a rhomboid transposition flap was deemed most appropriate.  Using a sterile surgical marker, an appropriate rhomboid flap was drawn incorporating the defect.    The area thus outlined was incised deep to adipose tissue with a #15 scalpel blade.  The skin margins were undermined to an appropriate distance in all directions utilizing iris scissors.
V-Y Plasty Text: The defect edges were debeveled with a #15 scalpel blade.  Given the location of the defect, shape of the defect and the proximity to free margins an V-Y advancement flap was deemed most appropriate.  Using a sterile surgical marker, an appropriate advancement flap was drawn incorporating the defect and placing the expected incisions within the relaxed skin tension lines where possible.    The area thus outlined was incised deep to adipose tissue with a #15 scalpel blade.  The skin margins were undermined to an appropriate distance in all directions utilizing iris scissors.
Epidermal Closure Graft Donor Site (Optional): running
Mohs Histo Method Verbiage: Each section was then chromacoded and processed in the Mohs lab using the Mohs protocol and submitted for frozen section.
Consent (Lip)/Introductory Paragraph: The rationale for Mohs was explained to the patient and consent was obtained. The risks, benefits and alternatives to therapy were discussed in detail. Specifically, the risks of lip deformity, changes in the oral aperture, infection, scarring, bleeding, prolonged wound healing, incomplete removal, allergy to anesthesia, nerve injury and recurrence were addressed. Prior to the procedure, the treatment site was clearly identified and confirmed by the patient. All components of Universal Protocol/PAUSE Rule completed.
Location Indication Override (Is Already Calculated Based On Selected Body Location): Area M
Consent Type: Consent 1 (Standard)
Paramedian Forehead Flap Text: A decision was made to reconstruct the defect utilizing an interpolation axial flap and a staged reconstruction.  A telfa template was made of the defect.  This telfa template was then used to outline the paramedian forehead pedicle flap.  The donor area for the pedicle flap was then injected with anesthesia.  The flap was excised through the skin and subcutaneous tissue down to the layer of the underlying musculature.  The pedicle flap was carefully excised within this deep plane to maintain its blood supply.  The edges of the donor site were undermined.   The donor site was closed in a primary fashion.  The pedicle was then rotated into position and sutured.  Once the tube was sutured into place, adequate blood supply was confirmed with blanching and refill.  The pedicle was then wrapped with xeroform gauze and dressed appropriately with a telfa and gauze bandage to ensure continued blood supply and protect the attached pedicle.
Complex Repair And Flap Additional Text (Will Appearing After The Standard Complex Repair Text): The complex repair was not sufficient to completely close the primary defect. The remaining additional defect was repaired with the flap mentioned below.
Modified Advancement Flap Text: The defect edges were debeveled with a #15 scalpel blade.  Given the location of the defect, shape of the defect and the proximity to free margins a modified advancement flap was deemed most appropriate.  Using a sterile surgical marker, an appropriate advancement flap was drawn incorporating the defect and placing the expected incisions within the relaxed skin tension lines where possible.    The area thus outlined was incised deep to adipose tissue with a #15 scalpel blade.  The skin margins were undermined to an appropriate distance in all directions utilizing iris scissors.
Surgeon/Pathologist Verbiage (Will Incorporate Name Of Surgeon From Intro If Not Blank): operated in two distinct and integrated capacities as the surgeon and pathologist.
Island Pedicle Flap-Requiring Vessel Identification Text: The defect edges were debeveled with a #15 scalpel blade.  Given the location of the defect, shape of the defect and the proximity to free margins an island pedicle advancement flap was deemed most appropriate.  Using a sterile surgical marker, an appropriate advancement flap was drawn, based on the axial vessel mentioned above, incorporating the defect, outlining the appropriate donor tissue and placing the expected incisions within the relaxed skin tension lines where possible.    The area thus outlined was incised deep to adipose tissue with a #15 scalpel blade.  The skin margins were undermined to an appropriate distance in all directions around the primary defect and laterally outward around the island pedicle utilizing iris scissors.  There was minimal undermining beneath the pedicle flap.
Which Instrument Did You Use For Dermabrasion?: Wire Brush
O-Z Plasty Text: The defect edges were debeveled with a #15 scalpel blade.  Given the location of the defect, shape of the defect and the proximity to free margins an O-Z plasty (double transposition flap) was deemed most appropriate.  Using a sterile surgical marker, the appropriate transposition flaps were drawn incorporating the defect and placing the expected incisions within the relaxed skin tension lines where possible.    The area thus outlined was incised deep to adipose tissue with a #15 scalpel blade.  The skin margins were undermined to an appropriate distance in all directions utilizing iris scissors.  Hemostasis was achieved with electrocautery.  The flaps were then transposed into place, one clockwise and the other counterclockwise, and anchored with interrupted buried subcutaneous sutures.
Estlander Flap (Upper To Lower Lip) Text: The defect of the lower lip was assessed and measured.  Given the location and size of the defect, an Estlander flap was deemed most appropriate.  Using a sterile surgical marker, an appropriate Estlander flap was measured and drawn on the upper lip. Local anesthesia was then infiltrated. A scalpel was then used to incise the lateral aspect of the flap, through skin, muscle and mucosa, leaving the flap pedicled medially.  The flap was then rotated and positioned to fill the lower lip defect.  The flap was then sutured into place with a three layer technique, closing the orbicularis oris muscle layer with subcutaneous buried sutures, followed by a mucosal layer and an epidermal layer.
Spiral Flap Text: The defect edges were debeveled with a #15 scalpel blade.  Given the location of the defect, shape of the defect and the proximity to free margins a spiral flap was deemed most appropriate.  Using a sterile surgical marker, an appropriate rotation flap was drawn incorporating the defect and placing the expected incisions within the relaxed skin tension lines where possible. The area thus outlined was incised deep to adipose tissue with a #15 scalpel blade.  The skin margins were undermined to an appropriate distance in all directions utilizing iris scissors.
Medical Necessity Statement: Based on my medical judgement, Mohs surgery is the most appropriate treatment for this cancer compared to other treatments.
Double O-Z Flap Text: The defect edges were debeveled with a #15 scalpel blade.  Given the location of the defect, shape of the defect and the proximity to free margins a Double O-Z flap was deemed most appropriate.  Using a sterile surgical marker, an appropriate transposition flap was drawn incorporating the defect and placing the expected incisions within the relaxed skin tension lines where possible. The area thus outlined was incised deep to adipose tissue with a #15 scalpel blade.  The skin margins were undermined to an appropriate distance in all directions utilizing iris scissors.
Post-Care Instructions: I reviewed with the patient in detail post-care instructions. Patient is not to engage in any heavy lifting, exercise, or swimming for the next 14 days. Should the patient develop any fevers, chills, bleeding, severe pain patient will contact the office immediately.
Which Eyelid Repair Cpt Are You Using?: 94120
Helical Rim Advancement Flap Text: The defect edges were debeveled with a #15 blade scalpel.  Given the location of the defect and the proximity to free margins (helical rim) a double helical rim advancement flap was deemed most appropriate.  Using a sterile surgical marker, the appropriate advancement flaps were drawn incorporating the defect and placing the expected incisions between the helical rim and antihelix where possible.  The area thus outlined was incised through and through with a #15 scalpel blade.  With a skin hook and iris scissors, the flaps were gently and sharply undermined and freed up.
Consent (Ear)/Introductory Paragraph: The rationale for Mohs was explained to the patient and consent was obtained. The risks, benefits and alternatives to therapy were discussed in detail. Specifically, the risks of ear deformity, infection, scarring, bleeding, prolonged wound healing, incomplete removal, allergy to anesthesia, nerve injury and recurrence were addressed. Prior to the procedure, the treatment site was clearly identified and confirmed by the patient. All components of Universal Protocol/PAUSE Rule completed.
Melolabial Interpolation Flap Text: A decision was made to reconstruct the defect utilizing an interpolation axial flap and a staged reconstruction.  A telfa template was made of the defect.  This telfa template was then used to outline the melolabial interpolation flap.  The donor area for the pedicle flap was then injected with anesthesia.  The flap was excised through the skin and subcutaneous tissue down to the layer of the underlying musculature.  The pedicle flap was carefully excised within this deep plane to maintain its blood supply.  The edges of the donor site were undermined.   The donor site was closed in a primary fashion.  The pedicle was then rotated into position and sutured.  Once the tube was sutured into place, adequate blood supply was confirmed with blanching and refill.  The pedicle was then wrapped with xeroform gauze and dressed appropriately with a telfa and gauze bandage to ensure continued blood supply and protect the attached pedicle.
Staging Info: By selecting yes to the question above you will include information on AJCC 8 tumor staging in your Mohs note. Information on tumor staging will be automatically added for SCCs on the head and neck. AJCC 8 includes tumor size, tumor depth, perineural involvement and bone invasion.
Undermining Type: Entire Wound
Star Wedge Flap Text: The defect edges were debeveled with a #15 scalpel blade.  Given the location of the defect, shape of the defect and the proximity to free margins a star wedge flap was deemed most appropriate.  Using a sterile surgical marker, an appropriate rotation flap was drawn incorporating the defect and placing the expected incisions within the relaxed skin tension lines where possible. The area thus outlined was incised deep to adipose tissue with a #15 scalpel blade.  The skin margins were undermined to an appropriate distance in all directions utilizing iris scissors.
Bilobed Transposition Flap Text: The defect edges were debeveled with a #15 scalpel blade.  Given the location of the defect and the proximity to free margins a bilobed transposition flap was deemed most appropriate.  Using a sterile surgical marker, an appropriate bilobe flap drawn around the defect.    The area thus outlined was incised deep to adipose tissue with a #15 scalpel blade.  The skin margins were undermined to an appropriate distance in all directions utilizing iris scissors.
Bilateral Helical Rim Advancement Flap Text: The defect edges were debeveled with a #15 blade scalpel.  Given the location of the defect and the proximity to free margins (helical rim) a bilateral helical rim advancement flap was deemed most appropriate.  Using a sterile surgical marker, the appropriate advancement flaps were drawn incorporating the defect and placing the expected incisions between the helical rim and antihelix where possible.  The area thus outlined was incised through and through with a #15 scalpel blade.  With a skin hook and iris scissors, the flaps were gently and sharply undermined and freed up.
Debridement Text: The wound edges were debrided prior to proceeding with the closure to facilitate wound healing.
Hemostasis: Electrocautery
Hatchet Flap Text: The defect edges were debeveled with a #15 scalpel blade.  Given the location of the defect, shape of the defect and the proximity to free margins a hatchet flap was deemed most appropriate.  Using a sterile surgical marker, an appropriate hatchet flap was drawn incorporating the defect and placing the expected incisions within the relaxed skin tension lines where possible.    The area thus outlined was incised deep to adipose tissue with a #15 scalpel blade.  The skin margins were undermined to an appropriate distance in all directions utilizing iris scissors.
Localized Dermabrasion With 15 Blade Text: The patient was draped in routine manner.  Localized dermabrasion using a 15 blade was performed in routine manner to papillary dermis. This spot dermabrasion is being performed to complete skin cancer reconstruction. It also will eliminate the other sun damaged precancerous cells that are known to be part of the regional effect of a lifetime's worth of sun exposure. This localized dermabrasion is therapeutic and should not be considered cosmetic in any regard.
Graft Donor Site Bandage (Optional-Leave Blank If You Don't Want In Note): Aquaplast was fitted to the graft site and sewn into place. A pressure bandage were applied to the donor site and over the aquaplast bolster.
Secondary Intention Text (Leave Blank If You Do Not Want): The defect will heal with secondary intention.
Depth Of Tumor Invasion (For Histology): dermis
Peng Advancement Flap Text: The defect edges were debeveled with a #15 scalpel blade.  Given the location of the defect, shape of the defect and the proximity to free margins a Peng advancement flap was deemed most appropriate.  Using a sterile surgical marker, an appropriate advancement flap was drawn incorporating the defect and placing the expected incisions within the relaxed skin tension lines where possible. The area thus outlined was incised deep to adipose tissue with a #15 scalpel blade.  The skin margins were undermined to an appropriate distance in all directions utilizing iris scissors.
Cartilage Graft Text: The defect edges were debeveled with a #15 scalpel blade.  Given the location of the defect, shape of the defect, the fact the defect involved a full thickness cartilage defect a cartilage graft was deemed most appropriate.  An appropriate donor site was identified, cleansed, and anesthetized. The cartilage graft was then harvested and transferred to the recipient site, oriented appropriately and then sutured into place.  The secondary defect was then repaired using a primary closure.
Retention Suture Text: Retention sutures were placed to support the closure and prevent dehiscence.
No Repair - Repaired With Adjacent Surgical Defect Text (Leave Blank If You Do Not Want): After obtaining clear surgical margins the defect was repaired concurrently with another surgical defect which was in close approximation.
Consent (Near Eyelid Margin)/Introductory Paragraph: The rationale for Mohs was explained to the patient and consent was obtained. The risks, benefits and alternatives to therapy were discussed in detail. Specifically, the risks of ectropion or eyelid deformity, infection, scarring, bleeding, prolonged wound healing, incomplete removal, allergy to anesthesia, nerve injury and recurrence were addressed. Prior to the procedure, the treatment site was clearly identified and confirmed by the patient. All components of Universal Protocol/PAUSE Rule completed.
Date Of Previous Biopsy (Optional): 7/8/24
Double Z Plasty Text: The lesion was extirpated to the level of the fat with a #15 scalpel blade. Given the location of the defect, shape of the defect and the proximity to free margins a double Z-plasty was deemed most appropriate for repair. Using a sterile surgical marker, the appropriate transposition arms of the double Z-plasty were drawn incorporating the defect and placing the expected incisions within the relaxed skin tension lines where possible. The area thus outlined was incised deep to adipose tissue with a #15 scalpel blade. The skin margins were undermined to an appropriate distance in all directions utilizing iris scissors. The opposing transposition arms were then transposed and carried over into place in opposite direction and anchored with interrupted buried subcutaneous sutures.
Undermining Location (Optional): in the superficial subcutaneous fat
Skin Substitute Text: The defect edges were debeveled with a #15 scalpel blade.  Given the location of the defect, shape of the defect and the proximity to free margins a skin substitute graft was deemed most appropriate.  The graft material was trimmed to fit the size of the defect. The graft was then placed in the primary defect and oriented appropriately.
Closure 2 Information: This tab is for additional flaps and grafts, including complex repair and grafts and complex repair and flaps. You can also specify a different location for the additional defect, if the location is the same you do not need to select a new one. We will insert the automated text for the repair you select below just as we do for solitary flaps and grafts. Please note that at this time if you select a location with a different insurance zone you will need to override the ICD10 and CPT if appropriate.
Abbe Flap (Lower To Upper Lip) Text: The defect of the upper lip was assessed and measured.  Given the location and size of the defect, an Abbe flap was deemed most appropriate.  Using a sterile surgical marker, an appropriate Abbe flap was measured and drawn on the lower lip. Local anesthesia was then infiltrated. A scalpel was then used to incise the upper lip through and through the skin, vermilion, muscle and mucosa, leaving the flap pedicled on the opposite side.  The flap was then rotated and transferred to the lower lip defect.  The flap was then sutured into place with a three layer technique, closing the orbicularis oris muscle layer with subcutaneous buried sutures, followed by a mucosal layer and an epidermal layer.
Area H Indication Text: Tumors in this location are included in Area H (eyelids, eyebrows, nose, lips, chin, ear, pre-auricular, post-auricular, temple, genitalia, hands, feet, ankles and areola).  Tissue conservation is critical in these anatomic locations.
Estimated Blood Loss (Cc): minimal
Detail Level: Detailed
Hemigard Intro: Due to skin fragility and wound tension, it was decided to use HEMIGARD adhesive retention suture devices to permit a linear closure. The skin was cleaned and dried for a 6cm distance away from the wound. Excessive hair, if present, was removed to allow for adhesion.
Mohs Rapid Report Verbiage: The area of clinically evident tumor was marked with skin marking ink and appropriately hatched.  The initial incision was made following the Mohs approach through the skin.  The specimen was taken to the lab, divided into the necessary number of pieces, chromacoded and processed according to the Mohs protocol.  This was repeated in successive stages until a tumor free defect was achieved.
Nostril Rim Text: The closure involved the nostril rim.
Subsequent Stages Histo Method Verbiage: Using a similar technique to that described above, a thin layer of tissue was removed from all areas where tumor was visible on the previous stage.  The tissue was again oriented, mapped, dyed, and processed as above.
Adjacent Tissue Transfer Text: The defect edges were debeveled with a #15 scalpel blade.  Given the location of the defect and the proximity to free margins an adjacent tissue transfer was deemed most appropriate.  Using a sterile surgical marker, an appropriate flap was drawn incorporating the defect and placing the expected incisions within the relaxed skin tension lines where possible.    The area thus outlined was incised deep to adipose tissue with a #15 scalpel blade.  The skin margins were undermined to an appropriate distance in all directions utilizing iris scissors.
Advancement Flap (Double) Text: The defect edges were debeveled with a #15 scalpel blade.  Given the location of the defect and the proximity to free margins a double advancement flap was deemed most appropriate.  Using a sterile surgical marker, the appropriate advancement flaps were drawn incorporating the defect and placing the expected incisions within the relaxed skin tension lines where possible.    The area thus outlined was incised deep to adipose tissue with a #15 scalpel blade.  The skin margins were undermined to an appropriate distance in all directions utilizing iris scissors.
Consent (Temporal Branch)/Introductory Paragraph: The rationale for Mohs was explained to the patient and consent was obtained. The risks, benefits and alternatives to therapy were discussed in detail. Specifically, the risks of damage to the temporal branch of the facial nerve, infection, scarring, bleeding, prolonged wound healing, incomplete removal, allergy to anesthesia, and recurrence were addressed. Prior to the procedure, the treatment site was clearly identified and confirmed by the patient. All components of Universal Protocol/PAUSE Rule completed.
Previous Accession (Optional): [id number]
Distance Of Undermining In Cm (Required): 1.3
Trilobed Flap Text: The defect edges were debeveled with a #15 scalpel blade.  Given the location of the defect and the proximity to free margins a trilobed flap was deemed most appropriate.  Using a sterile surgical marker, an appropriate trilobed flap drawn around the defect.    The area thus outlined was incised deep to adipose tissue with a #15 scalpel blade.  The skin margins were undermined to an appropriate distance in all directions utilizing iris scissors.
Transposition Flap Text: The defect edges were debeveled with a #15 scalpel blade.  Given the location of the defect and the proximity to free margins a transposition flap was deemed most appropriate.  Using a sterile surgical marker, an appropriate transposition flap was drawn incorporating the defect.    The area thus outlined was incised deep to adipose tissue with a #15 scalpel blade.  The skin margins were undermined to an appropriate distance in all directions utilizing iris scissors.
Zygomaticofacial Flap Text: Given the location of the defect, shape of the defect and the proximity to free margins a zygomaticofacial flap was deemed most appropriate for repair.  Using a sterile surgical marker, the appropriate flap was drawn incorporating the defect and placing the expected incisions within the relaxed skin tension lines where possible. The area thus outlined was incised deep to adipose tissue with a #15 scalpel blade with preservation of a vascular pedicle.  The skin margins were undermined to an appropriate distance in all directions utilizing iris scissors.  The flap was then placed into the defect and anchored with interrupted buried subcutaneous sutures.
Keystone Flap Text: The defect edges were debeveled with a #15 scalpel blade.  Given the location of the defect, shape of the defect a keystone flap was deemed most appropriate.  Using a sterile surgical marker, an appropriate keystone flap was drawn incorporating the defect, outlining the appropriate donor tissue and placing the expected incisions within the relaxed skin tension lines where possible. The area thus outlined was incised deep to adipose tissue with a #15 scalpel blade.  The skin margins were undermined to an appropriate distance in all directions around the primary defect and laterally outward around the flap utilizing iris scissors.
Graft Cartilage Fenestration Text: The cartilage was fenestrated with a 2mm punch biopsy to help facilitate graft survival and healing.
Mauc Instructions: By selecting yes to the question below the MAUC number will be added into the note.  This will be calculated automatically based on the diagnosis chosen, the size entered, the body zone selected (H,M,L) and the specific indications you chose. You will also have the option to override the Mohs AUC if you disagree with the automatically calculated number and this option is found in the Case Summary tab.
Alternatives Discussed Intro (Do Not Add Period): I discussed alternative treatments to Mohs surgery and specifically discussed the risks and benefits of
Tissue Cultured Epidermal Autograft Text: The defect edges were debeveled with a #15 scalpel blade.  Given the location of the defect, shape of the defect and the proximity to free margins a tissue cultured epidermal autograft was deemed most appropriate.  The graft was then trimmed to fit the size of the defect.  The graft was then placed in the primary defect and oriented appropriately.
Partial Purse String (Intermediate) Text: Given the location of the defect and the characteristics of the surrounding skin an intermediate purse string closure was deemed most appropriate.  Undermining was performed circumfirentially around the surgical defect.  A purse string suture was then placed and tightened. Wound tension only allowed a partial closure of the circular defect.
Consent (Nose)/Introductory Paragraph: The rationale for Mohs was explained to the patient and consent was obtained. The risks, benefits and alternatives to therapy were discussed in detail. Specifically, the risks of nasal deformity, changes in the flow of air through the nose, infection, scarring, bleeding, prolonged wound healing, incomplete removal, allergy to anesthesia, nerve injury and recurrence were addressed. Prior to the procedure, the treatment site was clearly identified and confirmed by the patient. All components of Universal Protocol/PAUSE Rule completed.
Number Of Stages: 2
Advancement-Rotation Flap Text: The defect edges were debeveled with a #15 scalpel blade.  Given the location of the defect, shape of the defect and the proximity to free margins an advancement-rotation flap was deemed most appropriate.  Using a sterile surgical marker, an appropriate flap was drawn incorporating the defect and placing the expected incisions within the relaxed skin tension lines where possible. The area thus outlined was incised deep to adipose tissue with a #15 scalpel blade.  The skin margins were undermined to an appropriate distance in all directions utilizing iris scissors.
O-T Advancement Flap Text: The defect edges were debeveled with a #15 scalpel blade.  Given the location of the defect, shape of the defect and the proximity to free margins an O-T advancement flap was deemed most appropriate.  Using a sterile surgical marker, an appropriate advancement flap was drawn incorporating the defect and placing the expected incisions within the relaxed skin tension lines where possible.    The area thus outlined was incised deep to adipose tissue with a #15 scalpel blade.  The skin margins were undermined to an appropriate distance in all directions utilizing iris scissors.
Consent 3/Introductory Paragraph: I gave the patient a chance to ask questions they had about the procedure.  Following this I explained the Mohs procedure and consent was obtained. The risks, benefits and alternatives to therapy were discussed in detail. Specifically, the risks of infection, scarring, bleeding, prolonged wound healing, incomplete removal, allergy to anesthesia, nerve injury and recurrence were addressed. Prior to the procedure, the treatment site was clearly identified and confirmed by the patient. All components of Universal Protocol/PAUSE Rule completed.
Mustarde Flap Text: The defect edges were debeveled with a #15 scalpel blade.  Given the size, depth and location of the defect and the proximity to free margins a Mustarde flap was deemed most appropriate.  Using a sterile surgical marker, an appropriate flap was drawn incorporating the defect. The area thus outlined was incised with a #15 scalpel blade.  The skin margins were undermined to an appropriate distance in all directions utilizing iris scissors.
Purse String (Simple) Text: Given the location of the defect and the characteristics of the surrounding skin a purse string closure was deemed most appropriate.  Undermining was performed circumfirentially around the surgical defect.  A purse string suture was then placed and tightened.
Bi-Rhombic Flap Text: The defect edges were debeveled with a #15 scalpel blade.  Given the location of the defect and the proximity to free margins a bi-rhombic flap was deemed most appropriate.  Using a sterile surgical marker, an appropriate rhombic flap was drawn incorporating the defect. The area thus outlined was incised deep to adipose tissue with a #15 scalpel blade.  The skin margins were undermined to an appropriate distance in all directions utilizing iris scissors.
Helical Rim Text: The closure involved the helical rim.
Rotation Flap Text: The defect edges were debeveled with a #15 scalpel blade.  Given the location of the defect, shape of the defect and the proximity to free margins a rotation flap was deemed most appropriate.  Using a sterile surgical marker, an appropriate rotation flap was drawn incorporating the defect and placing the expected incisions within the relaxed skin tension lines where possible.    The area thus outlined was incised deep to adipose tissue with a #15 scalpel blade.  The skin margins were undermined to an appropriate distance in all directions utilizing iris scissors.
Nasalis-Muscle-Based Myocutaneous Island Pedicle Flap Text: Using a #15 blade, an incision was made around the donor flap to the level of the nasalis muscle. Wide lateral undermining was then performed in both the subcutaneous plane above the nasalis muscle, and in a submuscular plane just above periosteum. This allowed the formation of a free nasalis muscle axial pedicle (based on the angular artery) which was still attached to the actual cutaneous flap, increasing its mobility and vascular viability. Hemostasis was obtained with pinpoint electrocoagulation. The flap was mobilized into position and the pivotal anchor points positioned and stabilized with buried interrupted sutures. Subcutaneous and dermal tissues were closed in a multilayered fashion with sutures. Tissue redundancies were excised, and the epidermal edges were apposed without significant tension and sutured with sutures.
Non-Graft Cartilage Fenestration Text: The cartilage was fenestrated with a 2mm punch biopsy to help facilitate healing.
Mercedes Flap Text: The defect edges were debeveled with a #15 scalpel blade.  Given the location of the defect, shape of the defect and the proximity to free margins a Mercedes flap was deemed most appropriate.  Using a sterile surgical marker, an appropriate advancement flap was drawn incorporating the defect and placing the expected incisions within the relaxed skin tension lines where possible. The area thus outlined was incised deep to adipose tissue with a #15 scalpel blade.  The skin margins were undermined to an appropriate distance in all directions utilizing iris scissors.
Double O-Z Plasty Text: The defect edges were debeveled with a #15 scalpel blade.  Given the location of the defect, shape of the defect and the proximity to free margins a Double O-Z plasty (double transposition flap) was deemed most appropriate.  Using a sterile surgical marker, the appropriate transposition flaps were drawn incorporating the defect and placing the expected incisions within the relaxed skin tension lines where possible. The area thus outlined was incised deep to adipose tissue with a #15 scalpel blade.  The skin margins were undermined to an appropriate distance in all directions utilizing iris scissors.  Hemostasis was achieved with electrocautery.  The flaps were then transposed into place, one clockwise and the other counterclockwise, and anchored with interrupted buried subcutaneous sutures.
O-T Plasty Text: The defect edges were debeveled with a #15 scalpel blade.  Given the location of the defect, shape of the defect and the proximity to free margins an O-T plasty was deemed most appropriate.  Using a sterile surgical marker, an appropriate O-T plasty was drawn incorporating the defect and placing the expected incisions within the relaxed skin tension lines where possible.    The area thus outlined was incised deep to adipose tissue with a #15 scalpel blade.  The skin margins were undermined to an appropriate distance in all directions utilizing iris scissors.
Referring Physician (Optional): perez
Muscle Hinge Flap Text: The defect edges were debeveled with a #15 scalpel blade.  Given the size, depth and location of the defect and the proximity to free margins a muscle hinge flap was deemed most appropriate.  Using a sterile surgical marker, an appropriate hinge flap was drawn incorporating the defect. The area thus outlined was incised with a #15 scalpel blade.  The skin margins were undermined to an appropriate distance in all directions utilizing iris scissors.
Orbicularis Oris Muscle Flap Text: The defect edges were debeveled with a #15 scalpel blade.  Given that the defect affected the competency of the oral sphincter an orbicularis oris muscle flap was deemed most appropriate to restore this competency and normal muscle function.  Using a sterile surgical marker, an appropriate flap was drawn incorporating the defect. The area thus outlined was incised with a #15 scalpel blade.
Localized Dermabrasion With Sand Papertext: The patient was draped in routine manner.  Localized dermabrasion using sterile sand paper was performed in routine manner to papillary dermis. This spot dermabrasion is being performed to complete skin cancer reconstruction. It also will eliminate the other sun damaged precancerous cells that are known to be part of the regional effect of a lifetime's worth of sun exposure. This localized dermabrasion is therapeutic and should not be considered cosmetic in any regard.
Cheiloplasty (Complex) Text: A decision was made to reconstruct the defect with a  cheiloplasty.  The defect was undermined extensively.  Additional obicularis oris muscle was excised with a 15 blade scalpel.  The defect was converted into a full thickness wedge to facilite a better cosmetic result.  Small vessels were then tied off with 5-0 monocyrl. The obicularis oris, superficial fascia, adipose and dermis were then reapproximated.  After the deeper layers were approximated the epidermis was reapproximated with particular care given to realign the vermilion border.
Dermal Autograft Text: The defect edges were debeveled with a #15 scalpel blade.  Given the location of the defect, shape of the defect and the proximity to free margins a dermal autograft was deemed most appropriate.  Using a sterile surgical marker, the primary defect shape was transferred to the donor site. The area thus outlined was incised deep to adipose tissue with a #15 scalpel blade.  The harvested graft was then trimmed of adipose and epidermal tissue until only dermis was left.  The skin graft was then placed in the primary defect and oriented appropriately.
Repair Type: Complex Repair
Bcc Infiltrative Histology Text: There were numerous aggregates of basaloid cells demonstrating an infiltrative pattern.
Eyelid Partial Thickness Repair - 27100: The eyelid defect was partial thickness which required a wedge repair of the eyelid. Special care was taken to ensure that the eyelid margin was realligned when placing sutures.
Localized Dermabrasion With Wire Brush Text: The patient was draped in routine manner.  Localized dermabrasion using 3 x 17 mm wire brush was performed in routine manner to papillary dermis. This spot dermabrasion is being performed to complete skin cancer reconstruction. It also will eliminate the other sun damaged precancerous cells that are known to be part of the regional effect of a lifetime's worth of sun exposure. This localized dermabrasion is therapeutic and should not be considered cosmetic in any regard.
Deep Sutures: 5-0 Maxon
Mastoid Interpolation Flap Text: A decision was made to reconstruct the defect utilizing an interpolation axial flap and a staged reconstruction.  A telfa template was made of the defect.  This telfa template was then used to outline the mastoid interpolation flap.  The donor area for the pedicle flap was then injected with anesthesia.  The flap was excised through the skin and subcutaneous tissue down to the layer of the underlying musculature.  The pedicle flap was carefully excised within this deep plane to maintain its blood supply.  The edges of the donor site were undermined.   The donor site was closed in a primary fashion.  The pedicle was then rotated into position and sutured.  Once the tube was sutured into place, adequate blood supply was confirmed with blanching and refill.  The pedicle was then wrapped with xeroform gauze and dressed appropriately with a telfa and gauze bandage to ensure continued blood supply and protect the attached pedicle.
Rectangular Flap Text: The defect edges were debeveled with a #15 scalpel blade. Given the location of the defect and the proximity to free margins a rectangular flap was deemed most appropriate. Using a sterile surgical marker, an appropriate rectangular flap was drawn incorporating the defect. The area thus outlined was incised deep to adipose tissue with a #15 scalpel blade. The skin margins were undermined to an appropriate distance in all directions utilizing iris scissors. Following this, the designed flap was carried over into the primary defect and sutured into place.
Unna Boot Text: An Unna boot was placed to help immobilize the limb and facilitate more rapid healing.
Mart-1 - Negative Histology Text: MART-1 staining demonstrates a normal density and pattern of melanocytes along the dermal-epidermal junction. The surgical margins are negative for tumor cells.
No Residual Tumor Seen Histology Text: There were no malignant cells seen in the sections examined.
Chonodrocutaneous Helical Advancement Flap Text: The defect edges were debeveled with a #15 scalpel blade.  Given the location of the defect and the proximity to free margins a chondrocutaneous helical advancement flap was deemed most appropriate.  Using a sterile surgical marker, the appropriate advancement flap was drawn incorporating the defect and placing the expected incisions within the relaxed skin tension lines where possible.    The area thus outlined was incised deep to adipose tissue with a #15 scalpel blade.  The skin margins were undermined to an appropriate distance in all directions utilizing iris scissors.
Pinch Graft Text: The defect edges were debeveled with a #15 scalpel blade. Given the location of the defect, shape of the defect and the proximity to free margins a pinch graft was deemed most appropriate. Using a sterile surgical marker, the primary defect shape was transferred to the donor site. The area thus outlined was incised deep to adipose tissue with a #15 scalpel blade.  The harvested graft was then trimmed of adipose tissue until only dermis and epidermis was left. The skin margins of the secondary defect were undermined to an appropriate distance in all directions utilizing iris scissors.  The secondary defect was closed with interrupted buried subcutaneous sutures.  The skin edges were then re-apposed with running  sutures.  The skin graft was then placed in the primary defect and oriented appropriately.
Intermediate Repair And Flap Additional Text (Will Appearing After The Standard Complex Repair Text): The intermediate repair was not sufficient to completely close the primary defect. The remaining additional defect was repaired with the flap mentioned below.
Bilobed Flap Text: The defect edges were debeveled with a #15 scalpel blade.  Given the location of the defect and the proximity to free margins a bilobe flap was deemed most appropriate.  Using a sterile surgical marker, an appropriate bilobe flap drawn around the defect.    The area thus outlined was incised deep to adipose tissue with a #15 scalpel blade.  The skin margins were undermined to an appropriate distance in all directions utilizing iris scissors.
Mohs Method Verbiage: An incision at a 45 degree angle following the standard Mohs approach was done and the specimen was harvested as a microscopic controlled layer.
Retention Suture Bite Size: 1 mm
Surgeon Performing Repair (Optional): Suyapa Donato MD
Manual Repair Warning Statement: We plan on removing the manually selected variable below in favor of our much easier automatic structured text blocks found in the previous tab. We decided to do this to help make the flow better and give you the full power of structured data. Manual selection is never going to be ideal in our platform and I would encourage you to avoid using manual selection from this point on, especially since I will be sunsetting this feature. It is important that you do one of two things with the customized text below. First, you can save all of the text in a word file so you can have it for future reference. Second, transfer the text to the appropriate area in the Library tab. Lastly, if there is a flap or graft type which we do not have you need to let us know right away so I can add it in before the variable is hidden. No need to panic, we plan to give you roughly 6 months to make the change.
Consent (Scalp)/Introductory Paragraph: The rationale for Mohs was explained to the patient and consent was obtained. The risks, benefits and alternatives to therapy were discussed in detail. Specifically, the risks of changes in hair growth pattern secondary to repair, infection, scarring, bleeding, prolonged wound healing, incomplete removal, allergy to anesthesia, nerve injury and recurrence were addressed. Prior to the procedure, the treatment site was clearly identified and confirmed by the patient. All components of Universal Protocol/PAUSE Rule completed.
Perineural Invasion (For Histology - Be Specific If Possible): absent
W Plasty Text: The lesion was extirpated to the level of the fat with a #15 scalpel blade.  Given the location of the defect, shape of the defect and the proximity to free margins a W-plasty was deemed most appropriate for repair.  Using a sterile surgical marker, the appropriate transposition arms of the W-plasty were drawn incorporating the defect and placing the expected incisions within the relaxed skin tension lines where possible.    The area thus outlined was incised deep to adipose tissue with a #15 scalpel blade.  The skin margins were undermined to an appropriate distance in all directions utilizing iris scissors.  The opposing transposition arms were then transposed into place in opposite direction and anchored with interrupted buried subcutaneous sutures.
Split-Thickness Skin Graft Text: The defect edges were debeveled with a #15 scalpel blade.  Given the location of the defect, shape of the defect and the proximity to free margins a split thickness skin graft was deemed most appropriate.  Using a sterile surgical marker, the primary defect shape was transferred to the donor site. The split thickness graft was then harvested.  The skin graft was then placed in the primary defect and oriented appropriately.
Melolabial Transposition Flap Text: The defect edges were debeveled with a #15 scalpel blade.  Given the location of the defect and the proximity to free margins a melolabial flap was deemed most appropriate.  Using a sterile surgical marker, an appropriate melolabial transposition flap was drawn incorporating the defect.    The area thus outlined was incised deep to adipose tissue with a #15 scalpel blade.  The skin margins were undermined to an appropriate distance in all directions utilizing iris scissors.
Nasalis Myocutaneous Flap Text: Using a #15 blade, an incision was made around the donor flap to the level of the nasalis muscle. Wide lateral undermining was then performed in both the subcutaneous plane above the nasalis muscle, and in a submuscular plane just above periosteum. This allowed the formation of a free nasalis muscle axial pedicle which was still attached to the actual cutaneous flap, increasing its mobility and vascular viability. Hemostasis was obtained with pinpoint electrocoagulation. The flap was mobilized into position and the pivotal anchor points positioned and stabilized with buried interrupted sutures. Subcutaneous and dermal tissues were closed in a multilayered fashion with sutures. Tissue redundancies were excised, and the epidermal edges were apposed without significant tension and sutured with sutures.
Tumor Depth: Less than 6mm from granular layer and no invasion beyond the subcutaneous fat
Information: Selecting Yes will display possible errors in your note based on the variables you have selected. This validation is only offered as a suggestion for you. PLEASE NOTE THAT THE VALIDATION TEXT WILL BE REMOVED WHEN YOU FINALIZE YOUR NOTE. IF YOU WANT TO FAX A PRELIMINARY NOTE YOU WILL NEED TO TOGGLE THIS TO 'NO' IF YOU DO NOT WANT IT IN YOUR FAXED NOTE.
Bcc Histology Text: There were numerous aggregates of basaloid cells.
Tumor Debulked?: curette
Mohs Case Number: [id number]
Graft Donor Site Dermal Sutures (Optional): 5-0 Polysorb
Rhombic Flap Text: The defect edges were debeveled with a #15 scalpel blade.  Given the location of the defect and the proximity to free margins a rhombic flap was deemed most appropriate.  Using a sterile surgical marker, an appropriate rhombic flap was drawn incorporating the defect.    The area thus outlined was incised deep to adipose tissue with a #15 scalpel blade.  The skin margins were undermined to an appropriate distance in all directions utilizing iris scissors.
Alar Island Pedicle Flap Text: The defect edges were debeveled with a #15 scalpel blade.  Given the location of the defect, shape of the defect and the proximity to the alar rim an island pedicle advancement flap was deemed most appropriate.  Using a sterile surgical marker, an appropriate advancement flap was drawn incorporating the defect, outlining the appropriate donor tissue and placing the expected incisions within the nasal ala running parallel to the alar rim. The area thus outlined was incised with a #15 scalpel blade.  The skin margins were undermined minimally to an appropriate distance in all directions around the primary defect and laterally outward around the island pedicle utilizing iris scissors.  There was minimal undermining beneath the pedicle flap.
Mart-1 - Positive Histology Text: MART-1 staining demonstrates areas of higher density and clustering of melanocytes with Pagetoid spread upwards within the epidermis. The surgical margins are positive for tumor cells.
Area M Indication Text: Tumors in this location are included in Area M (cheek, forehead, scalp, neck, jawline and pretibial skin).  Mohs surgery is indicated for tumors in these anatomic locations.
Bilateral Rotation Flap Text: The defect edges were debeveled with a #15 scalpel blade. Given the location of the defect, shape of the defect and the proximity to free margins a bilateral rotation flap was deemed most appropriate. Using a sterile surgical marker, an appropriate rotation flap was drawn incorporating the defect and placing the expected incisions within the relaxed skin tension lines where possible. The area thus outlined was incised deep to adipose tissue with a #15 scalpel blade. The skin margins were undermined to an appropriate distance in all directions utilizing iris scissors. Following this, the designed flap was carried over into the primary defect and sutured into place.
Crescentic Advancement Flap Text: The defect edges were debeveled with a #15 scalpel blade.  Given the location of the defect and the proximity to free margins a crescentic advancement flap was deemed most appropriate.  Using a sterile surgical marker, the appropriate advancement flap was drawn incorporating the defect and placing the expected incisions within the relaxed skin tension lines where possible.    The area thus outlined was incised deep to adipose tissue with a #15 scalpel blade.  The skin margins were undermined to an appropriate distance in all directions utilizing iris scissors.
Dorsal Nasal Flap Text: The defect edges were debeveled with a #15 scalpel blade.  Given the location of the defect and the proximity to free margins a dorsal nasal flap was deemed most appropriate.  Using a sterile surgical marker, an appropriate dorsal nasal flap was drawn around the defect.    The area thus outlined was incised deep to adipose tissue with a #15 scalpel blade.  The skin margins were undermined to an appropriate distance in all directions utilizing iris scissors.
Simple / Intermediate / Complex Repair - Final Wound Length In Cm: 3.6
Nasal Turnover Hinge Flap Text: The defect edges were debeveled with a #15 scalpel blade.  Given the size, depth, location of the defect and the defect being full thickness a nasal turnover hinge flap was deemed most appropriate.  Using a sterile surgical marker, an appropriate hinge flap was drawn incorporating the defect. The area thus outlined was incised with a #15 scalpel blade. The flap was designed to recreate the nasal mucosal lining and the alar rim. The skin margins were undermined to an appropriate distance in all directions utilizing iris scissors.
Interpolation Flap Text: A decision was made to reconstruct the defect utilizing an interpolation axial flap and a staged reconstruction.  A telfa template was made of the defect.  This telfa template was then used to outline the interpolation flap.  The donor area for the pedicle flap was then injected with anesthesia.  The flap was excised through the skin and subcutaneous tissue down to the layer of the underlying musculature.  The interpolation flap was carefully excised within this deep plane to maintain its blood supply.  The edges of the donor site were undermined.   The donor site was closed in a primary fashion.  The pedicle was then rotated into position and sutured.  Once the tube was sutured into place, adequate blood supply was confirmed with blanching and refill.  The pedicle was then wrapped with xeroform gauze and dressed appropriately with a telfa and gauze bandage to ensure continued blood supply and protect the attached pedicle.
Island Pedicle Flap With Canthal Suspension Text: The defect edges were debeveled with a #15 scalpel blade.  Given the location of the defect, shape of the defect and the proximity to free margins an island pedicle advancement flap was deemed most appropriate.  Using a sterile surgical marker, an appropriate advancement flap was drawn incorporating the defect, outlining the appropriate donor tissue and placing the expected incisions within the relaxed skin tension lines where possible. The area thus outlined was incised deep to adipose tissue with a #15 scalpel blade.  The skin margins were undermined to an appropriate distance in all directions around the primary defect and laterally outward around the island pedicle utilizing iris scissors.  There was minimal undermining beneath the pedicle flap. A suspension suture was placed in the canthal tendon to prevent tension and prevent ectropion.
V-Y Flap Text: The defect edges were debeveled with a #15 scalpel blade.  Given the location of the defect, shape of the defect and the proximity to free margins a V-Y flap was deemed most appropriate.  Using a sterile surgical marker, an appropriate advancement flap was drawn incorporating the defect and placing the expected incisions within the relaxed skin tension lines where possible.    The area thus outlined was incised deep to adipose tissue with a #15 scalpel blade.  The skin margins were undermined to an appropriate distance in all directions utilizing iris scissors.
Advancement Flap (Single) Text: The defect edges were debeveled with a #15 scalpel blade.  Given the location of the defect and the proximity to free margins a single advancement flap was deemed most appropriate.  Using a sterile surgical marker, an appropriate advancement flap was drawn incorporating the defect and placing the expected incisions within the relaxed skin tension lines where possible.    The area thus outlined was incised deep to adipose tissue with a #15 scalpel blade.  The skin margins were undermined to an appropriate distance in all directions utilizing iris scissors.
Where Do You Want The Question To Include Opioid Counseling Located?: Case Summary Tab
O-L Flap Text: The defect edges were debeveled with a #15 scalpel blade.  Given the location of the defect, shape of the defect and the proximity to free margins an O-L flap was deemed most appropriate.  Using a sterile surgical marker, an appropriate advancement flap was drawn incorporating the defect and placing the expected incisions within the relaxed skin tension lines where possible.    The area thus outlined was incised deep to adipose tissue with a #15 scalpel blade.  The skin margins were undermined to an appropriate distance in all directions utilizing iris scissors.
Bill 59 Modifier?: No - Continue to Bill 79 Modifier
Flip-Flop Flap Text: The defect edges were debeveled with a #15 blade scalpel.  Given the location of the defect and the proximity to free margins a flip-flop flap was deemed most appropriate. Using a sterile surgical marker, the appropriate flap was drawn incorporating the defect and placing the expected incisions between the helical rim and antihelix where possible.  The area thus outlined was incised through and through with a #15 scalpel blade. Following this, the designed flap was carried over into the primary defect and sutured into place.
Nasolabial Transposition Flap Text: The defect edges were debeveled with a #15 scalpel blade.  Given the size, depth and location of the defect and the proximity to free margins a nasolabial transposition flap was deemed most appropriate. Using a sterile surgical marker, an appropriate flap was drawn incorporating the defect. The area thus outlined was incised with a #15 scalpel blade. The skin margins were undermined to an appropriate distance in all directions utilizing iris scissors. Following this, the designed flap was carried into the primary defect and sutured into place.

## 2024-09-11 ENCOUNTER — APPOINTMENT (RX ONLY)
Dept: URBAN - METROPOLITAN AREA CLINIC 36 | Facility: CLINIC | Age: 74
Setting detail: DERMATOLOGY
End: 2024-09-11

## 2024-09-11 DIAGNOSIS — Z48.02 ENCOUNTER FOR REMOVAL OF SUTURES: ICD-10-CM

## 2024-09-11 PROCEDURE — ? SUTURE REMOVAL (GLOBAL PERIOD)

## 2024-09-11 ASSESSMENT — LOCATION DETAILED DESCRIPTION DERM: LOCATION DETAILED: LEFT SUPERIOR MEDIAL MALAR CHEEK

## 2024-09-11 ASSESSMENT — LOCATION SIMPLE DESCRIPTION DERM: LOCATION SIMPLE: LEFT CHEEK

## 2024-09-11 ASSESSMENT — LOCATION ZONE DERM: LOCATION ZONE: FACE

## 2024-09-11 NOTE — PROCEDURE: SUTURE REMOVAL (GLOBAL PERIOD)
Detail Level: Detailed
Add 72594 Cpt? (Important Note: In 2017 The Use Of 70221 Is Being Tracked By Cms To Determine Future Global Period Reimbursement For Global Periods): no

## 2024-09-18 ENCOUNTER — PATIENT MESSAGE (OUTPATIENT)
Dept: MEDICAL GROUP | Facility: MEDICAL CENTER | Age: 74
End: 2024-09-18
Payer: MEDICARE

## 2024-09-18 DIAGNOSIS — E78.2 MIXED HYPERLIPIDEMIA: ICD-10-CM

## 2024-09-20 ENCOUNTER — HOSPITAL ENCOUNTER (OUTPATIENT)
Dept: LAB | Facility: MEDICAL CENTER | Age: 74
End: 2024-09-20
Attending: FAMILY MEDICINE
Payer: MEDICARE

## 2024-09-20 DIAGNOSIS — E78.2 MIXED HYPERLIPIDEMIA: ICD-10-CM

## 2024-09-20 LAB
BASOPHILS # BLD AUTO: 0.7 % (ref 0–1.8)
BASOPHILS # BLD: 0.05 K/UL (ref 0–0.12)
EOSINOPHIL # BLD AUTO: 0.35 K/UL (ref 0–0.51)
EOSINOPHIL NFR BLD: 4.7 % (ref 0–6.9)
ERYTHROCYTE [DISTWIDTH] IN BLOOD BY AUTOMATED COUNT: 43.4 FL (ref 35.9–50)
HCT VFR BLD AUTO: 47.8 % (ref 42–52)
HGB BLD-MCNC: 16.6 G/DL (ref 14–18)
IMM GRANULOCYTES # BLD AUTO: 0.02 K/UL (ref 0–0.11)
IMM GRANULOCYTES NFR BLD AUTO: 0.3 % (ref 0–0.9)
LYMPHOCYTES # BLD AUTO: 2.54 K/UL (ref 1–4.8)
LYMPHOCYTES NFR BLD: 34 % (ref 22–41)
MCH RBC QN AUTO: 32.2 PG (ref 27–33)
MCHC RBC AUTO-ENTMCNC: 34.7 G/DL (ref 32.3–36.5)
MCV RBC AUTO: 92.6 FL (ref 81.4–97.8)
MONOCYTES # BLD AUTO: 0.77 K/UL (ref 0–0.85)
MONOCYTES NFR BLD AUTO: 10.3 % (ref 0–13.4)
NEUTROPHILS # BLD AUTO: 3.75 K/UL (ref 1.82–7.42)
NEUTROPHILS NFR BLD: 50 % (ref 44–72)
NRBC # BLD AUTO: 0 K/UL
NRBC BLD-RTO: 0 /100 WBC (ref 0–0.2)
PLATELET # BLD AUTO: 211 K/UL (ref 164–446)
PMV BLD AUTO: 10 FL (ref 9–12.9)
RBC # BLD AUTO: 5.16 M/UL (ref 4.7–6.1)
WBC # BLD AUTO: 7.5 K/UL (ref 4.8–10.8)

## 2024-09-20 PROCEDURE — 80061 LIPID PANEL: CPT

## 2024-09-20 PROCEDURE — 85025 COMPLETE CBC W/AUTO DIFF WBC: CPT

## 2024-09-20 PROCEDURE — 36415 COLL VENOUS BLD VENIPUNCTURE: CPT

## 2024-09-20 PROCEDURE — 80053 COMPREHEN METABOLIC PANEL: CPT

## 2024-09-21 LAB
ALBUMIN SERPL BCP-MCNC: 4.3 G/DL (ref 3.2–4.9)
ALBUMIN/GLOB SERPL: 1.4 G/DL
ALP SERPL-CCNC: 90 U/L (ref 30–99)
ALT SERPL-CCNC: 19 U/L (ref 2–50)
ANION GAP SERPL CALC-SCNC: 12 MMOL/L (ref 7–16)
AST SERPL-CCNC: 31 U/L (ref 12–45)
BILIRUB SERPL-MCNC: 0.8 MG/DL (ref 0.1–1.5)
BUN SERPL-MCNC: 18 MG/DL (ref 8–22)
CALCIUM ALBUM COR SERPL-MCNC: 9.2 MG/DL (ref 8.5–10.5)
CALCIUM SERPL-MCNC: 9.4 MG/DL (ref 8.5–10.5)
CHLORIDE SERPL-SCNC: 104 MMOL/L (ref 96–112)
CHOLEST SERPL-MCNC: 156 MG/DL (ref 100–199)
CO2 SERPL-SCNC: 23 MMOL/L (ref 20–33)
CREAT SERPL-MCNC: 1.12 MG/DL (ref 0.5–1.4)
GFR SERPLBLD CREATININE-BSD FMLA CKD-EPI: 69 ML/MIN/1.73 M 2
GLOBULIN SER CALC-MCNC: 3 G/DL (ref 1.9–3.5)
GLUCOSE SERPL-MCNC: 86 MG/DL (ref 65–99)
HDLC SERPL-MCNC: 41 MG/DL
LDLC SERPL CALC-MCNC: 94 MG/DL
POTASSIUM SERPL-SCNC: 4.5 MMOL/L (ref 3.6–5.5)
PROT SERPL-MCNC: 7.3 G/DL (ref 6–8.2)
SODIUM SERPL-SCNC: 139 MMOL/L (ref 135–145)
TRIGL SERPL-MCNC: 107 MG/DL (ref 0–149)

## 2024-10-03 ENCOUNTER — APPOINTMENT (OUTPATIENT)
Dept: MEDICAL GROUP | Facility: MEDICAL CENTER | Age: 74
End: 2024-10-03
Payer: MEDICARE

## 2024-10-03 VITALS
TEMPERATURE: 97.8 F | WEIGHT: 219 LBS | RESPIRATION RATE: 20 BRPM | DIASTOLIC BLOOD PRESSURE: 64 MMHG | SYSTOLIC BLOOD PRESSURE: 120 MMHG | HEART RATE: 63 BPM | OXYGEN SATURATION: 95 % | BODY MASS INDEX: 33.19 KG/M2 | HEIGHT: 68 IN

## 2024-10-03 DIAGNOSIS — R06.02 SOB (SHORTNESS OF BREATH): ICD-10-CM

## 2024-10-03 PROCEDURE — 99214 OFFICE O/P EST MOD 30 MIN: CPT | Performed by: FAMILY MEDICINE

## 2024-10-03 ASSESSMENT — FIBROSIS 4 INDEX: FIB4 SCORE: 2.46

## 2024-10-22 RX ORDER — ATORVASTATIN CALCIUM 40 MG/1
40 TABLET, FILM COATED ORAL DAILY
Qty: 100 TABLET | Refills: 3 | Status: SHIPPED | OUTPATIENT
Start: 2024-10-22

## 2024-11-01 ASSESSMENT — ENCOUNTER SYMPTOMS
SHORTNESS OF BREATH: 1
DYSPNEA AT REST: 0
WHEEZING: 0
CHEST TIGHTNESS: 0
RESPIRATORY SYMPTOMS COMMENTS: NO
HEMOPTYSIS: 0

## 2024-11-05 ENCOUNTER — PATIENT MESSAGE (OUTPATIENT)
Dept: SLEEP MEDICINE | Facility: MEDICAL CENTER | Age: 74
End: 2024-11-05

## 2024-11-05 ENCOUNTER — OFFICE VISIT (OUTPATIENT)
Dept: SLEEP MEDICINE | Facility: MEDICAL CENTER | Age: 74
End: 2024-11-05
Attending: STUDENT IN AN ORGANIZED HEALTH CARE EDUCATION/TRAINING PROGRAM
Payer: MEDICARE

## 2024-11-05 VITALS
DIASTOLIC BLOOD PRESSURE: 70 MMHG | HEIGHT: 68 IN | WEIGHT: 224 LBS | BODY MASS INDEX: 33.95 KG/M2 | SYSTOLIC BLOOD PRESSURE: 142 MMHG | OXYGEN SATURATION: 97 % | HEART RATE: 79 BPM

## 2024-11-05 DIAGNOSIS — J45.20 MILD INTERMITTENT REACTIVE AIRWAY DISEASE WITHOUT COMPLICATION: ICD-10-CM

## 2024-11-05 DIAGNOSIS — R06.09 EXERTIONAL DYSPNEA: ICD-10-CM

## 2024-11-05 PROBLEM — R06.02 SOB (SHORTNESS OF BREATH): Status: RESOLVED | Noted: 2024-07-03 | Resolved: 2024-11-05

## 2024-11-05 PROBLEM — J45.909 REACTIVE AIRWAY DISEASE: Status: ACTIVE | Noted: 2024-11-05

## 2024-11-05 PROCEDURE — 99212 OFFICE O/P EST SF 10 MIN: CPT | Performed by: INTERNAL MEDICINE

## 2024-11-05 PROCEDURE — 3077F SYST BP >= 140 MM HG: CPT | Performed by: INTERNAL MEDICINE

## 2024-11-05 PROCEDURE — 99204 OFFICE O/P NEW MOD 45 MIN: CPT | Performed by: INTERNAL MEDICINE

## 2024-11-05 PROCEDURE — 3078F DIAST BP <80 MM HG: CPT | Performed by: INTERNAL MEDICINE

## 2024-11-05 RX ORDER — FLUTICASONE PROPIONATE AND SALMETEROL 100; 50 UG/1; UG/1
1 POWDER RESPIRATORY (INHALATION) 2 TIMES DAILY
Qty: 1 EACH | Refills: 11 | Status: SHIPPED | OUTPATIENT
Start: 2024-11-05 | End: 2024-11-06 | Stop reason: SDUPTHER

## 2024-11-05 ASSESSMENT — ENCOUNTER SYMPTOMS
DIZZINESS: 0
HEMOPTYSIS: 0
HEADACHES: 0
SHORTNESS OF BREATH: 1
ORTHOPNEA: 0
WHEEZING: 0
NAUSEA: 0
DEPRESSION: 0
WEIGHT LOSS: 0
CHILLS: 0
FEVER: 0
VOMITING: 0

## 2024-11-05 ASSESSMENT — FIBROSIS 4 INDEX: FIB4 SCORE: 2.46

## 2024-11-05 NOTE — PROGRESS NOTES
Pulmonary Clinic- Initial Consult    Date of Service: 11/5/2024    Referring Physician: Kim Pendleton M.D.    Reason for Consult: HOFF    Chief Complaint:   Chief Complaint   Patient presents with    Establish Care     PULM NP/ REF BY: GARRETT/ DX: Exertional dyspnea    Results     CT-Chest 08/21/2024, Labs 09/20/2024       HPI:   Uday Osman is a 74 y.o. male who is referred to the pulmonary clinic for dyspnea on exertion.  Patient is an avid hiker and has noticed over the last couple of years that he has had a decrease in his exercise capacity.  He had COVID in June 2023 and since that time has had ongoing cough with sputum production and dyspnea on exertion.  He has had a CT scan of his chest which showed no sign of fibrosis.  He had a cardiac workup including coronary angiogram which has been negative.  He has not yet had PFTs.  He has no personal or family history of asthma.  He is a lifetime non-smoker.    Functional status:  MMRC Grade:   0: Breathless only during strenuous exercise  1: Short of breath when hurrying or going up a small hill  2: Walks slower than friends due to breathlessness, has to stop at own pace  3: Stops to catch breath on level ground after 100m  4: Breathless with ambulating around house or ADLs      Past Medical History:   Diagnosis Date    Diverticulosis of large intestine without hemorrhage 03/21/2018    Elevated liver enzymes 06/16/2022    Fever and chills 01/06/2023    Fungus infection 12/23/2021    High cholesterol     Left anterior knee pain 07/28/2021    Paronychia of fourth toe 03/14/2023    Prostatitis 01/06/2023    Snoring     SOB (shortness of breath) 7/3/2024    Urinary bladder disorder March 2020    Urinary Retention    UTI due to extended-spectrum beta lactamase (ESBL) producing Escherichia coli 01/06/2023    Ventral hernia 04/22/2015    ICD-10 transition       Past Surgical History:   Procedure Laterality Date    MA TRANSURETHRAL ELEC-SURG PROSTATECTOM  12/6/2022     Procedure: BIPOLAR TRANSURETHRAL RESECTION OF PROSTATE;  Surgeon: Phillip Higgins M.D.;  Location: SURGERY [facility];  Service: Urology    VENTRAL HERNIA REPAIR  2015    Performed by Sánchez Mayorga M.D. at SURGERY [facility]    COLONOSCOPY  01/01/2013    X 2    OTHER ORTHOPEDIC SURGERY      fx finger/ age 15       Social History     Socioeconomic History    Marital status:      Spouse name: Not on file    Number of children: Not on file    Years of education: Not on file    Highest education level: Associate degree: occupational, technical, or vocational program   Occupational History    Not on file   Tobacco Use    Smoking status: Former     Current packs/day: 0.00     Average packs/day: 1 pack/day for 25.3 years (25.3 ttl pk-yrs)     Types: Cigarettes     Start date: 10/1/1964     Quit date: 1990     Years since quittin.8    Smokeless tobacco: Never   Vaping Use    Vaping status: Never Used   Substance and Sexual Activity    Alcohol use: Yes     Comment: very occasional    Drug use: Yes     Types: Marijuana     Comment: occasional Cannabis edible rarely    Sexual activity: Not Currently   Other Topics Concern    Not on file   Social History Narrative    Not on file     Social Drivers of Health     Financial Resource Strain: Low Risk  (2024)    Overall Financial Resource Strain (CARDIA)     Difficulty of Paying Living Expenses: Not hard at all   Food Insecurity: No Food Insecurity (2024)    Hunger Vital Sign     Worried About Running Out of Food in the Last Year: Never true     Ran Out of Food in the Last Year: Never true   Transportation Needs: No Transportation Needs (2024)    PRAPARE - Transportation     Lack of Transportation (Medical): No     Lack of Transportation (Non-Medical): No   Physical Activity: Sufficiently Active (2024)    Exercise Vital Sign     Days of Exercise per Week: 4 days     Minutes of Exercise per Session: 60 min   Stress: No Stress  Concern Present (1/31/2024)    [employer] [employer] - Occupational Stress Questionnaire     Feeling of Stress : Not at all   Social Connections: Moderately Integrated (1/31/2024)    Social Connection and Isolation Panel [NHANES]     Frequency of Communication with Friends and Family: More than three times a week     Frequency of Social Gatherings with Friends and Family: Never     Attends Scientologist Services: Never     Active Member of Clubs or Organizations: No     Attends Club or Organization Meetings: 1 to 4 times per year     Marital Status:    Intimate Partner Violence: Not At Risk (12/14/2022)    Humiliation, Afraid, Rape, and Kick questionnaire     Fear of Current or Ex-Partner: No     Emotionally Abused: No     Physically Abused: No     Sexually Abused: No   Housing Stability: Low Risk  (1/31/2024)    Housing Stability Vital Sign     Unable to Pay for Housing in the Last Year: No     Number of Places Lived in the Last Year: 1     Unstable Housing in the Last Year: No          Family History   Problem Relation Age of Onset    Glaucoma Mother     Diabetes Father     Heart Disease Father     Hypertension Father     Hyperlipidemia Father     Stroke Father     Heart Attack Neg Hx        Current Outpatient Medications on File Prior to Visit   Medication Sig Dispense Refill    atorvastatin (LIPITOR) 40 MG Tab TAKE 1 TABLET BY MOUTH EVERY  Tablet 3    Probiotic Product (PROBIOTIC DAILY PO) Take  by mouth.      Omega-3 Fatty Acids (FISH OIL) 1000 MG Cap capsule Take 1,000 mg by mouth every day.      Cholecalciferol (VITAMIN D3 PO) Take 1 Tablet by mouth every day.      psyllium (METAMUCIL) 58.12 % Pack Take 1 Packet by mouth every day.      cetirizine (ZYRTEC) 10 MG Tab Take 10 mg by mouth every day.      ascorbic acid (ASCORBIC ACID) 500 MG TABS Take 1,500 mg by mouth every day.       No current facility-administered medications on file prior to visit.       Allergies: Patient has no  "known allergies.    Review of Systems   Constitutional:  Negative for chills, fever, malaise/fatigue and weight loss.   Respiratory:  Positive for shortness of breath. Negative for hemoptysis and wheezing.    Cardiovascular:  Negative for chest pain, orthopnea and leg swelling.   Gastrointestinal:  Negative for nausea and vomiting.   Genitourinary:  Negative for dysuria and urgency.   Neurological:  Negative for dizziness and headaches.   Psychiatric/Behavioral:  Negative for depression and suicidal ideas.        Vitals:  BP (!) 142/70 (BP Location: Left arm, Patient Position: Sitting, BP Cuff Size: Adult)   Pulse 79   Ht 1.727 m (5' 8\")   Wt 102 kg (224 lb)   SpO2 97%     Physical Exam  Vitals and nursing note reviewed.   Constitutional:       Appearance: Normal appearance.   HENT:      Head: Normocephalic and atraumatic.   Eyes:      Conjunctiva/sclera: Conjunctivae normal.   Cardiovascular:      Rate and Rhythm: Normal rate and regular rhythm.   Pulmonary:      Effort: Pulmonary effort is normal.      Breath sounds: Normal breath sounds.   Abdominal:      Palpations: Abdomen is soft.   Skin:     General: Skin is warm and dry.   Neurological:      Mental Status: He is alert and oriented to person, place, and time. Mental status is at baseline.   Psychiatric:         Mood and Affect: Mood normal.         Behavior: Behavior normal.         Laboratory Data:      Pertinent Studies:    PFTs as reviewed by me personally show:    Imaging as reviewed by me personally show:    CT Chest 8/21/2024  1. No acute process seen.  2. Coronary artery calcifications.  3. Scoliosis.    Echo:  7/25/2024  No prior study is available for comparison.   Normal left ventricular systolic function.  The left ventricular ejection fraction is visually estimated to be 60%.  No significant valvular abnormalities.        Cardiac Nuclear Stress Test   Normal exercise myocardial perfusion study.   No evidence of ischemia or infarct.   SDS 0.   " LVEF 60%.   Nonspecific EKG changes noted with stress.   Good exercise tolerance.      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     This note was generated using voice recognition software which has a chance of producing errors of grammar and possibly content.  I have made every reasonable attempt to find and correct any obvious errors, but it should be expected that some may not be found prior to finalization of this note.    Time spent in record review prior to patient arrival, reviewing results, and in face-to-face encounter totaled 45 min, excluding any procedures if performed.    Trista Henderson, DO   Pulmonary and Critical Care  [facility]

## 2024-11-05 NOTE — ASSESSMENT & PLAN NOTE
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

## 2024-11-06 ENCOUNTER — OFFICE VISIT (OUTPATIENT)
Dept: CARDIOLOGY | Facility: MEDICAL CENTER | Age: 74
End: 2024-11-06
Attending: INTERNAL MEDICINE
Payer: MEDICARE

## 2024-11-06 VITALS
RESPIRATION RATE: 16 BRPM | BODY MASS INDEX: 33.95 KG/M2 | SYSTOLIC BLOOD PRESSURE: 118 MMHG | HEART RATE: 79 BPM | DIASTOLIC BLOOD PRESSURE: 80 MMHG | OXYGEN SATURATION: 97 % | HEIGHT: 68 IN | WEIGHT: 224 LBS

## 2024-11-06 DIAGNOSIS — R06.09 EXERTIONAL DYSPNEA: ICD-10-CM

## 2024-11-06 DIAGNOSIS — J45.20 MILD INTERMITTENT REACTIVE AIRWAY DISEASE WITHOUT COMPLICATION: ICD-10-CM

## 2024-11-06 DIAGNOSIS — E78.2 MIXED HYPERLIPIDEMIA: ICD-10-CM

## 2024-11-06 DIAGNOSIS — R93.1 AGATSTON CAC SCORE 200-399: ICD-10-CM

## 2024-11-06 PROCEDURE — 3079F DIAST BP 80-89 MM HG: CPT | Performed by: INTERNAL MEDICINE

## 2024-11-06 PROCEDURE — 99212 OFFICE O/P EST SF 10 MIN: CPT | Performed by: INTERNAL MEDICINE

## 2024-11-06 PROCEDURE — 99214 OFFICE O/P EST MOD 30 MIN: CPT | Performed by: INTERNAL MEDICINE

## 2024-11-06 PROCEDURE — 3074F SYST BP LT 130 MM HG: CPT | Performed by: INTERNAL MEDICINE

## 2024-11-06 RX ORDER — ATORVASTATIN CALCIUM 80 MG/1
80 TABLET, FILM COATED ORAL NIGHTLY
Qty: 90 TABLET | Refills: 3 | Status: SHIPPED | OUTPATIENT
Start: 2024-11-06

## 2024-11-06 RX ORDER — FLUTICASONE PROPIONATE AND SALMETEROL 100; 50 UG/1; UG/1
1 POWDER RESPIRATORY (INHALATION) 2 TIMES DAILY
Qty: 1 EACH | Refills: 11 | Status: SHIPPED | OUTPATIENT
Start: 2024-11-06

## 2024-11-06 RX ORDER — FLUTICASONE PROPIONATE AND SALMETEROL 100; 50 UG/1; UG/1
1 POWDER RESPIRATORY (INHALATION) 2 TIMES DAILY
Qty: 1 EACH | Refills: 11 | Status: SHIPPED | OUTPATIENT
Start: 2024-11-06 | End: 2024-11-06 | Stop reason: SDUPTHER

## 2024-11-06 ASSESSMENT — FIBROSIS 4 INDEX: FIB4 SCORE: 2.46

## 2024-11-06 ASSESSMENT — ENCOUNTER SYMPTOMS
WEIGHT LOSS: 0
DEPRESSION: 0
SHORTNESS OF BREATH: 0
CHILLS: 0
DIZZINESS: 0
NEUROLOGICAL NEGATIVE: 1
PSYCHIATRIC NEGATIVE: 1
COUGH: 0
WEAKNESS: 0
ABDOMINAL PAIN: 0
EYES NEGATIVE: 1
CARDIOVASCULAR NEGATIVE: 1
HEADACHES: 0
PALPITATIONS: 0
DOUBLE VISION: 0
GASTROINTESTINAL NEGATIVE: 1
MYALGIAS: 0
NAUSEA: 0
MUSCULOSKELETAL NEGATIVE: 1
RESPIRATORY NEGATIVE: 1
BLURRED VISION: 0
NERVOUS/ANXIOUS: 0
BRUISES/BLEEDS EASILY: 0
CONSTITUTIONAL NEGATIVE: 1
FOCAL WEAKNESS: 0
FEVER: 0
CLAUDICATION: 0
VOMITING: 0

## 2024-11-06 NOTE — PROGRESS NOTES
Chief Complaint   Patient presents with    Hyperlipidemia       Subjective     Uday Sarkis Osman is a 74 y.o. male who presents today for follow up of shortness of breath, positive CT coronary calcium study, dyslipidemia.    Since the patient's last visit on 07/03/24, he has been doing well clinically from cardiac standpoint. He admit to dyspnea on exertion. He denies fatigue, chest pain, palpitations, lower extremity edema, dizziness or syncope. He underwent CT coronary calcium study which was positive as described. He has not been exercising, however, is inspired to restart exercising.     Past Medical History:   Diagnosis Date    Diverticulosis of large intestine without hemorrhage 03/21/2018    Elevated liver enzymes 06/16/2022    Fever and chills 01/06/2023    Fungus infection 12/23/2021    High cholesterol     Left anterior knee pain 07/28/2021    Paronychia of fourth toe 03/14/2023    Prostatitis 01/06/2023    Reactive airway disease 11/5/2024    Shortness of breath     Snoring     SOB (shortness of breath) 07/03/2024    Urinary bladder disorder March 2020    Urinary Retention    UTI due to extended-spectrum beta lactamase (ESBL) producing Escherichia coli 01/06/2023    Ventral hernia 04/22/2015    ICD-10 transition     Past Surgical History:   Procedure Laterality Date    NC TRANSURETHRAL ELEC-SURG PROSTATECTOM  12/6/2022    Procedure: BIPOLAR TRANSURETHRAL RESECTION OF PROSTATE;  Surgeon: Phillip Higgins M.D.;  Location: SURGERY [facility];  Service: Urology    VENTRAL HERNIA REPAIR  04/22/2015    Performed by Sánchez Mayorga M.D. at SURGERY [facility]    COLONOSCOPY  01/01/2013    X 2    OTHER ORTHOPEDIC SURGERY      fx finger/ age 15     Family History   Problem Relation Age of Onset    Glaucoma Mother     Diabetes Father     Heart Disease Father     Hypertension Father     Hyperlipidemia Father     Stroke Father     Heart Attack Neg Hx      Social History     Socioeconomic History    Marital  status:      Spouse name: Not on file    Number of children: Not on file    Years of education: Not on file    Highest education level: Associate degree: occupational, technical, or vocational program   Occupational History    Not on file   Tobacco Use    Smoking status: Former     Current packs/day: 0.00     Average packs/day: 1 pack/day for 25.3 years (25.3 ttl pk-yrs)     Types: Cigarettes     Start date: 10/1/1964     Quit date: 1990     Years since quittin.8    Smokeless tobacco: Never   Vaping Use    Vaping status: Never Used   Substance and Sexual Activity    Alcohol use: Yes     Comment: very occasional    Drug use: Yes     Types: Marijuana     Comment: occasional Cannabis edible rarely    Sexual activity: Not Currently   Other Topics Concern    Not on file   Social History Narrative    Not on file     Social Drivers of Health     Financial Resource Strain: Low Risk  (2024)    Overall Financial Resource Strain (CARDIA)     Difficulty of Paying Living Expenses: Not hard at all   Food Insecurity: No Food Insecurity (2024)    Hunger Vital Sign     Worried About Running Out of Food in the Last Year: Never true     Ran Out of Food in the Last Year: Never true   Transportation Needs: No Transportation Needs (2024)    PRAPARE - Transportation     Lack of Transportation (Medical): No     Lack of Transportation (Non-Medical): No   Physical Activity: Sufficiently Active (2024)    Exercise Vital Sign     Days of Exercise per Week: 4 days     Minutes of Exercise per Session: 60 min   Stress: No Stress Concern Present (2024)    [place] [place] [employer] - Occupational Stress Questionnaire     Feeling of Stress : Not at all   Social Connections: Moderately Integrated (2024)    Social Connection and Isolation Panel [NHANES]     Frequency of Communication with Friends and Family: More than three times a week     Frequency of Social Gatherings with Friends and  Family: Never     Attends Catholic Services: Never     Active Member of Clubs or Organizations: No     Attends Club or Organization Meetings: 1 to 4 times per year     Marital Status:    Intimate Partner Violence: Not At Risk (12/14/2022)    Humiliation, Afraid, Rape, and Kick questionnaire     Fear of Current or Ex-Partner: No     Emotionally Abused: No     Physically Abused: No     Sexually Abused: No   Housing Stability: Low Risk  (1/31/2024)    Housing Stability Vital Sign     Unable to Pay for Housing in the Last Year: No     Number of Places Lived in the Last Year: 1     Unstable Housing in the Last Year: No     No Known Allergies    (Medications reviewed.)  Outpatient Encounter Medications as of 11/6/2024   Medication Sig Dispense Refill    fluticasone-salmeterol (ADVAIR) 100-50 MCG/ACT AEROSOL POWDER, BREATH ACTIVATED Inhale 1 Puff 2 times a day. 1 Each 11    atorvastatin (LIPITOR) 40 MG Tab TAKE 1 TABLET BY MOUTH EVERY  Tablet 3    Probiotic Product (PROBIOTIC DAILY PO) Take  by mouth.      Omega-3 Fatty Acids (FISH OIL) 1000 MG Cap capsule Take 1,000 mg by mouth every day.      Cholecalciferol (VITAMIN D3 PO) Take 1 Tablet by mouth every day.      cetirizine (ZYRTEC) 10 MG Tab Take 10 mg by mouth every day.      ascorbic acid (ASCORBIC ACID) 500 MG TABS Take 1,500 mg by mouth every day.      [DISCONTINUED] psyllium (METAMUCIL) 58.12 % Pack Take 1 Packet by mouth every day.       No facility-administered encounter medications on file as of 11/6/2024.     Review of Systems   Constitutional: Negative.  Negative for chills, fever, malaise/fatigue and weight loss.   HENT: Negative.  Negative for hearing loss.    Eyes: Negative.  Negative for blurred vision and double vision.   Respiratory: Negative.  Negative for cough and shortness of breath.    Cardiovascular: Negative.  Negative for chest pain, palpitations, claudication and leg swelling.   Gastrointestinal: Negative.  Negative for abdominal  "pain, nausea and vomiting.   Genitourinary: Negative.  Negative for dysuria and urgency.   Musculoskeletal: Negative.  Negative for joint pain and myalgias.   Skin: Negative.  Negative for itching and rash.   Neurological: Negative.  Negative for dizziness, focal weakness, weakness and headaches.   Endo/Heme/Allergies: Negative.  Does not bruise/bleed easily.   Psychiatric/Behavioral: Negative.  Negative for depression. The patient is not nervous/anxious.               Objective     /80 (BP Location: Left arm, Patient Position: Sitting, BP Cuff Size: Adult)   Pulse 79   Resp 16   Ht 1.727 m (5' 8\")   Wt 102 kg (224 lb)   SpO2 97%   BMI 34.06 kg/m²     Physical Exam  Constitutional:       Appearance: Normal appearance. He is well-developed and normal weight.   HENT:      Head: Normocephalic and atraumatic.   Neck:      Vascular: No JVD.   Cardiovascular:      Rate and Rhythm: Normal rate and regular rhythm.      Heart sounds: Normal heart sounds.   Pulmonary:      Effort: Pulmonary effort is normal.      Breath sounds: Normal breath sounds.   Abdominal:      General: Bowel sounds are normal.      Palpations: Abdomen is soft.      Comments: No hepatosplenomegaly.   Musculoskeletal:         General: Normal range of motion.   Lymphadenopathy:      Cervical: No cervical adenopathy.   Skin:     General: Skin is warm and dry.   Neurological:      Mental Status: He is alert and oriented to person, place, and time.            CARDIAC STUDIES/PROCEDURES:     ABDOMINAL ULTRASOUND (02/24/17)  Normal abdominal aortic ultrasound for age.     CT CARDIAC SCORING (08/21/24)  Coronary calcification:  LMA - 0.0  LCX - 34.6  LAD - 258.9  RCA - 0.0  Total Calcium Score: 293.5  Percentile: Calcium score is above the 25th percentile for the patient's age and sex.  IMPRESSION:  Calcium Score of 100-399 AND <75th percentile:  (study result reviewed)     ECHOCARDIOGRAM CONCLUSIONS (07/25/24)  No prior study is available for " comparison.   Normal left ventricular systolic function.  The left ventricular ejection fraction is visually estimated to be 60%.  No significant valvular abnormalities.   (study result reviewed)     EKG performed on (07/03/24) EKG shows sinus rhythm.      Laboratory results of (09/20/24) were reviewed. Cholesterol profile of 156/10/41/94 mg/dL noted.  Laboratory results of (02/03/24) Cholesterol profile of 157/103/43/93 mg/dL noted.    MYOCARDIAL PERFUSION STUDY CONCLUSIONS (07/25/24)  NUCLEAR IMAGING INTERPRETATION  Normal exercise myocardial perfusion study.  No evidence of ischemia or infarct.  SDS 0.  LVEF 60%.  Nonspecific EKG changes noted with stress.  Good exercise tolerance.  Kervin 07:05, 8.9 METs.   Occasional PVCs.  ECG INTERPRETATION  Nonspecific EKG changes noted with stress.  (study result reviewed)     Assessment & Plan     1. Exertional dyspnea        2. Agatston CAC score 200-399        3. Mixed hyperlipidemia            Medical Decision Making: Today's Assessment/Status/Plan:        Shortness of breath: He is a 74 year old male with positive CT coronary calcium study, dyslipidemia. He is experiencing shortness of breath which is out of proportion to his cardiac issues. He established with a pulmonologist and has been started on inhaler therapy.   Positive CT coronary calcium study: He is clinically doing well from coronary standpoint. We will continue with current medical therapy including atorvastatin.   Hyperlipidemia: He is doing well on statin therapy without myalgia symptoms. His LDL level is not at target. We will increase atorvastatin dose to 80 mg. We will repeat labs including fasting lipid profile.    We will follow up in 3 months.    CC Jesi Rod

## 2024-11-06 NOTE — TELEPHONE ENCOUNTER
Patient was seen yesterday and requested a prescription for Advair to be sent to [facility] on [address] and was sent to [facility]. Can we please resend it to [facility]. Thanks

## 2024-11-19 ENCOUNTER — NON-PROVIDER VISIT (OUTPATIENT)
Dept: SLEEP MEDICINE | Facility: MEDICAL CENTER | Age: 74
End: 2024-11-19
Attending: INTERNAL MEDICINE
Payer: MEDICARE

## 2024-11-19 VITALS — WEIGHT: 218 LBS | HEIGHT: 69 IN | BODY MASS INDEX: 32.29 KG/M2

## 2024-11-19 DIAGNOSIS — R06.09 EXERTIONAL DYSPNEA: ICD-10-CM

## 2024-11-19 PROCEDURE — 94729 DIFFUSING CAPACITY: CPT | Performed by: INTERNAL MEDICINE

## 2024-11-19 PROCEDURE — 94726 PLETHYSMOGRAPHY LUNG VOLUMES: CPT | Mod: 26 | Performed by: STUDENT IN AN ORGANIZED HEALTH CARE EDUCATION/TRAINING PROGRAM

## 2024-11-19 PROCEDURE — 94729 DIFFUSING CAPACITY: CPT | Mod: 26 | Performed by: STUDENT IN AN ORGANIZED HEALTH CARE EDUCATION/TRAINING PROGRAM

## 2024-11-19 PROCEDURE — 94060 EVALUATION OF WHEEZING: CPT | Mod: 26 | Performed by: STUDENT IN AN ORGANIZED HEALTH CARE EDUCATION/TRAINING PROGRAM

## 2024-11-19 PROCEDURE — 94060 EVALUATION OF WHEEZING: CPT | Performed by: INTERNAL MEDICINE

## 2024-11-19 PROCEDURE — 94726 PLETHYSMOGRAPHY LUNG VOLUMES: CPT | Performed by: INTERNAL MEDICINE

## 2024-11-19 ASSESSMENT — FIBROSIS 4 INDEX: FIB4 SCORE: 2.46

## 2024-11-19 NOTE — PROCEDURES
Technician: Mary Javed RRT, CPFT  Good patient effort & cooperation.  The results of this test meet the ATS/ERS standards for acceptability & reproducibility.  Test was performed on the [employer] Body Plethysmograph-Elite DX system.  Predicted equations for Spirometry are GLI-2012, ITS for lung volumes, and GLI-2017 for DLCO.  The DLCO was uncorrected for Hgb.  A bronchodilator of Ventolin HFA -2puffs via spacer administered.  DLCO performed during dilation period.    Interpretation:  FVC 4.62 L, 118%  FEV1 3.54 L, 119%  FEV1/FVC 77%  TLC 8.12 L, 120%  ERV 0.59 L, 51%  DLCO 28.53, 117%  Flow volume loops: Normal    Spirometry demonstrates no airflow obstruction.  There is no bronchodilator response.  Lung volumes are normal except for an isolated reduction in ERV which indicates a component of extrapulmonary restriction due to BMI of 32.7.  Gas exchange is normal.

## 2024-12-10 ENCOUNTER — APPOINTMENT (OUTPATIENT)
Dept: MEDICAL GROUP | Facility: MEDICAL CENTER | Age: 74
End: 2024-12-10
Payer: MEDICARE

## 2024-12-17 ENCOUNTER — OFFICE VISIT (OUTPATIENT)
Dept: MEDICAL GROUP | Facility: MEDICAL CENTER | Age: 74
End: 2024-12-17
Payer: MEDICARE

## 2024-12-17 VITALS
RESPIRATION RATE: 18 BRPM | SYSTOLIC BLOOD PRESSURE: 122 MMHG | WEIGHT: 213.07 LBS | HEART RATE: 64 BPM | HEIGHT: 69 IN | OXYGEN SATURATION: 98 % | DIASTOLIC BLOOD PRESSURE: 64 MMHG | TEMPERATURE: 97.8 F | BODY MASS INDEX: 31.56 KG/M2

## 2024-12-17 DIAGNOSIS — E66.811 CLASS 1 OBESITY WITH SERIOUS COMORBIDITY AND BODY MASS INDEX (BMI) OF 31.0 TO 31.9 IN ADULT, UNSPECIFIED OBESITY TYPE: ICD-10-CM

## 2024-12-17 DIAGNOSIS — Z71.3 ENCOUNTER FOR WEIGHT LOSS COUNSELING: ICD-10-CM

## 2024-12-17 PROCEDURE — 3074F SYST BP LT 130 MM HG: CPT | Performed by: FAMILY MEDICINE

## 2024-12-17 PROCEDURE — 3078F DIAST BP <80 MM HG: CPT | Performed by: FAMILY MEDICINE

## 2024-12-17 PROCEDURE — 99214 OFFICE O/P EST MOD 30 MIN: CPT | Performed by: FAMILY MEDICINE

## 2024-12-17 RX ORDER — PHENTERMINE AND TOPIRAMATE 3.75; 23 MG/1; MG/1
1 CAPSULE, EXTENDED RELEASE ORAL DAILY
Qty: 14 CAPSULE | Refills: 0 | Status: SHIPPED | OUTPATIENT
Start: 2024-12-17 | End: 2024-12-31

## 2024-12-17 RX ORDER — PHENTERMINE AND TOPIRAMATE 7.5; 46 MG/1; MG/1
1 CAPSULE, EXTENDED RELEASE ORAL DAILY
Qty: 45 CAPSULE | Refills: 2 | Status: SHIPPED | OUTPATIENT
Start: 2024-12-31 | End: 2025-02-14

## 2024-12-17 ASSESSMENT — FIBROSIS 4 INDEX: FIB4 SCORE: 2.46

## 2024-12-17 NOTE — PROGRESS NOTES
This medical record contains text that has been entered with the assistance of computer voice recognition and dictation software.  Therefore, it may contain unintended errors in text, spelling, punctuation, or grammar        Chief Complaint   Patient presents with    Weight Check     Pt would like to get a rx to help him with weight loss       Uday Osman is a 74 y.o. male here evaluation and management of:       History of Present Illness  The patient presents for a 6-month visit and weight management.    He has expressed interest in obtaining a prescription for Qsymia, a topic previously discussed during his last visit. Despite initial reluctance, he now wishes to proceed with the medication. He has experienced significant weight gain, reaching his highest recorded weight. His diet includes a post-workout protein shake containing eggs, protein powder, peanut butter, and banana. Additionally, he consumes canned vegetables and sandwiches. He acknowledges that his weight loss efforts are hindered by evening snacking habits, which include rice cakes and nuts. He has been actively engaged in physical exercise and has lost 7 pounds since his last visit. He has been supplementing his diet with protein shakes following gym sessions, which occur every other day. He has expressed concerns about potential side effects of Qsymia, including sleep disturbances and anxiety. He has a history of using diet pills in his youth, which he found unpleasant. He has also reported a previous experience of taste alteration due to a 90-day course of terbinafine for toenail fungus. He has expressed a desire to understand the potential side effects of Qsymia before committing to its use. He has also inquired about the possibility of continuing Jesus Manuel while on Qsymia, a medication he uses sporadically after larger meals.    He recently consulted with his pulmonologist, who conducted a pulmonary function test. The results were largely  positive, with one exception where the score was only 54 percent. The report suggested that this could be attributed to his elevated BMI. He hypothesizes that weight loss may alleviate his shortness of breath.    His cardiologist, Dr. Barak Bauman, has advised against the use of weight loss medications. He underwent a cardiac stress test over the summer, which yielded normal results. His echocardiogram was also normal. However, his CT cardiac score indicated minor plaque buildup. Consequently, his cardiologist increased his statin dosage from 40 mg to 80 mg.    FAMILY HISTORY  Both of his daughters have issues with overeating and being overweight.    MEDICATIONS  Current: atorvastatin, Jesus Manuel        Current Outpatient Medications   Medication Sig Dispense Refill    Phentermine-Topiramate (QSYMIA) 3.75-23 MG CAPSULE SR 24 HR Take 1 Tablet by mouth every day for 14 days. 14 Capsule 0    [START ON 12/31/2024] Phentermine-Topiramate (QSYMIA) 7.5-46 MG CAPSULE SR 24 HR Take 1 Tablet by mouth every day for 45 days. 45 Capsule 2    atorvastatin (LIPITOR) 80 MG tablet Take 1 Tablet by mouth every evening. 90 Tablet 3    fluticasone-salmeterol (ADVAIR) 100-50 MCG/ACT AEROSOL POWDER, BREATH ACTIVATED Inhale 1 Puff 2 times a day. 1 Each 11    Probiotic Product (PROBIOTIC DAILY PO) Take  by mouth.      Omega-3 Fatty Acids (FISH OIL) 1000 MG Cap capsule Take 1,000 mg by mouth every day.      Cholecalciferol (VITAMIN D3 PO) Take 1 Tablet by mouth every day.      cetirizine (ZYRTEC) 10 MG Tab Take 10 mg by mouth every day.      ascorbic acid (ASCORBIC ACID) 500 MG TABS Take 1,500 mg by mouth every day.       No current facility-administered medications for this visit.     Patient Active Problem List    Diagnosis Date Noted    Agatston CAC score 200-399 11/06/2024    Exertional dyspnea 11/05/2024    Reactive airway disease 11/05/2024    Bone lesion 02/23/2024    Pain in both knees 02/08/2024    Travel advice encounter 02/08/2024    BMI  "32.0-32.9,adult 2023    Urinary retention 2022    Mixed hyperlipidemia 2018     Past Surgical History:   Procedure Laterality Date    NC TRANSURETHRAL ELEC-SURG PROSTATECTOM  2022    Procedure: BIPOLAR TRANSURETHRAL RESECTION OF PROSTATE;  Surgeon: Phillip Higgins M.D.;  Location: SURGERY [facility];  Service: Urology    VENTRAL HERNIA REPAIR  2015    Performed by Sánchez Mayorga M.D. at SURGERY [facility]    COLONOSCOPY  01/01/2013    X 2    OTHER ORTHOPEDIC SURGERY      fx finger/ age 15      Social History     Tobacco Use    Smoking status: Former     Current packs/day: 0.00     Average packs/day: 1 pack/day for 25.3 years (25.3 ttl pk-yrs)     Types: Cigarettes     Start date: 10/1/1964     Quit date: 1990     Years since quittin.9    Smokeless tobacco: Never   Vaping Use    Vaping status: Never Used   Substance Use Topics    Alcohol use: Yes     Comment: very occasional    Drug use: Yes     Types: Marijuana     Comment: occasional Cannabis edible rarely     Family History   Problem Relation Age of Onset    Glaucoma Mother     Diabetes Father     Heart Disease Father     Hypertension Father     Hyperlipidemia Father     Stroke Father     Heart Attack Neg Hx            ROS    all review of system completed and negative except for those listed above     Objective:     /64 (BP Location: Left arm, Patient Position: Sitting, BP Cuff Size: Adult)   Pulse 64   Temp 36.6 °C (97.8 °F) (Temporal)   Resp 18   Ht 1.74 m (5' 8.5\")   Wt 96.6 kg (213 lb 1.2 oz)   SpO2 98%  Body mass index is 31.93 kg/m².  Physical Exam:        GEN: comfortable, alert and oriented, well nourished, well developed, in no apparent distress   HEENT: NCAT, eyes: pupils equal and reactive, sclera white, EOMIT, good dentition  HEART: limbs warm and well perfused, regular rate, no JVD, no lower extremity edema  LUNGS: speaking in full sentences, not in apparent respiratory distress, no audible " wheezes  MSK: normal tone and bulk, no swelling of the joints, gait steady and normal       Assessment and Plan:   The following treatment plan was discussed        Assessment & Plan  Encounter for weight loss counseling    Orders:    Phentermine-Topiramate (QSYMIA) 3.75-23 MG CAPSULE SR 24 HR; Take 1 Tablet by mouth every day for 14 days.    Phentermine-Topiramate (QSYMIA) 7.5-46 MG CAPSULE SR 24 HR; Take 1 Tablet by mouth every day for 45 days.    Class 1 obesity with serious comorbidity and body mass index (BMI) of 31.0 to 31.9 in adult, unspecified obesity type    Orders:    Phentermine-Topiramate (QSYMIA) 3.75-23 MG CAPSULE SR 24 HR; Take 1 Tablet by mouth every day for 14 days.    Phentermine-Topiramate (QSYMIA) 7.5-46 MG CAPSULE SR 24 HR; Take 1 Tablet by mouth every day for 45 days.        Assessment & Plan  1. Weight management.  He has demonstrated a commendable effort in weight loss, evidenced by a 7-pound reduction since the previous visit. His current BMI is approximately 32 or 33. It was explained that an elevated BMI can predispose individuals to various medical conditions, including respiratory disorders. The role of genetics in weight management was also discussed, with an emphasis on the importance of lifestyle modifications. He was informed that Qsymia is a safe and effective medication for weight loss, particularly when combined with appropriate nutrition. Potential side effects of Qsymia, including dysgeusia, were discussed. He was reassured that Jesus Manuel is a safe weight loss medication and can be continued while on Qsymia. He was advised to maintain a daily protein intake of 120 to 140 g, which can be achieved through the consumption of 4 to 5 small meals, two of which can be replaced with protein shakes. He was also advised to limit his total caloric intake to between 1500 and 1700 calories per day. A prescription for Qsymia will be sent to [facility], with instructions to start with a 14-day dose  before increasing to the next level. He will trial Qsymia for a period of 2 to 4 weeks, after which he will return for a follow-up visit to assess his progress.    2. Elevated BMI.  He was informed that elevated BMI can contribute to various medical conditions, including respiratory conditions. He was advised that losing weight could help improve his shortness of breath. He was encouraged to continue his current efforts in weight loss, including his workout regimen and dietary changes.    3. Hyperlipidemia.  His cardiologist doubled his statin dosage from 40 mg to 80 mg. He was reassured that this adjustment is safe.    Follow-up  The patient is scheduled for a follow-up visit in 1 month.          Instructed to follow up if symptoms worsen or fail to improve, ER/UC precautions discussed as well    Jesi Najera MD  [facility], Family Medicine   [address] [place] [address]   Mike DIMAS [id number]  Phone: [phone]

## 2025-01-21 ENCOUNTER — OFFICE VISIT (OUTPATIENT)
Dept: MEDICAL GROUP | Facility: MEDICAL CENTER | Age: 75
End: 2025-01-21
Payer: MEDICARE

## 2025-01-21 ENCOUNTER — PATIENT MESSAGE (OUTPATIENT)
Dept: MEDICAL GROUP | Facility: MEDICAL CENTER | Age: 75
End: 2025-01-21

## 2025-01-21 VITALS
WEIGHT: 200 LBS | OXYGEN SATURATION: 97 % | TEMPERATURE: 97.7 F | HEIGHT: 69 IN | HEART RATE: 79 BPM | SYSTOLIC BLOOD PRESSURE: 112 MMHG | BODY MASS INDEX: 29.62 KG/M2 | RESPIRATION RATE: 20 BRPM | DIASTOLIC BLOOD PRESSURE: 64 MMHG

## 2025-01-21 DIAGNOSIS — Z71.84 TRAVEL ADVICE ENCOUNTER: ICD-10-CM

## 2025-01-21 DIAGNOSIS — Z71.3 ENCOUNTER FOR WEIGHT LOSS COUNSELING: ICD-10-CM

## 2025-01-21 PROCEDURE — 3078F DIAST BP <80 MM HG: CPT | Performed by: FAMILY MEDICINE

## 2025-01-21 PROCEDURE — 3074F SYST BP LT 130 MM HG: CPT | Performed by: FAMILY MEDICINE

## 2025-01-21 PROCEDURE — 99214 OFFICE O/P EST MOD 30 MIN: CPT | Performed by: FAMILY MEDICINE

## 2025-01-21 ASSESSMENT — FIBROSIS 4 INDEX: FIB4 SCORE: 2.46

## 2025-01-22 NOTE — PROGRESS NOTES
This medical record contains text that has been entered with the assistance of computer voice recognition and dictation software.  Therefore, it may contain unintended errors in text, spelling, punctuation, or grammar        Chief Complaint   Patient presents with    Weight Check       Uday Osman is a 74 y.o. male here evaluation and management of:       History of Present Illness  The patient presents for a follow-up regarding weight loss.    He was previously prescribed Qsymia, which he reports has been beneficial in his weight loss journey. He experienced minor side effects such as dry mouth but did not find them bothersome. He expressed initial concerns about potential amphetamine-like effects but has not observed any impact on his sleep. He believes the medication has significantly contributed to his weight loss, noting a marked decrease in appetite since starting the full dose. He reports a reduced caloric intake, supplemented with protein, and an overall improvement in his dietary habits. He is currently consuming approximately 1000 calories per day. He is planning a cruise trip and is seeking advice on whether to continue his medication during this period. He anticipates a potential weight gain due to the availability of free food on the cruise. He also reports experiencing joint pain in his knees and hips, predominantly at night. He has been engaging in weightlifting exercises and consuming protein powder 2 to 3 times daily. Prior to reducing his caloric intake, he was consuming between 125 to 150 grams of protein, which he has now adjusted to around 100 grams. He has been monitoring his body mass index (BMI) online and has observed a decrease from 230 pounds six weeks ago to his current weight of 197 pounds. He expresses skepticism about the accuracy of the BMI calculations, citing a decrease in height from 5 feet 11 inches to 5 feet 8.5 inches over the past 15 years, which he attributes to  spinal curvature and joint compression. He recalls feeling well when his weight was around 190 pounds and aims to return to this weight range.    Supplemental Information  He has a history of diverticulitis, which occasionally causes him discomfort, although he has not experienced this recently.    MEDICATIONS  Current: Qsymia        Current Outpatient Medications   Medication Sig Dispense Refill    Phentermine-Topiramate (QSYMIA) 7.5-46 MG CAPSULE SR 24 HR Take 1 Tablet by mouth every day for 45 days. 45 Capsule 2    atorvastatin (LIPITOR) 80 MG tablet Take 1 Tablet by mouth every evening. 90 Tablet 3    fluticasone-salmeterol (ADVAIR) 100-50 MCG/ACT AEROSOL POWDER, BREATH ACTIVATED Inhale 1 Puff 2 times a day. 1 Each 11    Probiotic Product (PROBIOTIC DAILY PO) Take  by mouth.      Omega-3 Fatty Acids (FISH OIL) 1000 MG Cap capsule Take 1,000 mg by mouth every day.      Cholecalciferol (VITAMIN D3 PO) Take 1 Tablet by mouth every day.      cetirizine (ZYRTEC) 10 MG Tab Take 10 mg by mouth every day.      ascorbic acid (ASCORBIC ACID) 500 MG TABS Take 1,500 mg by mouth every day.       No current facility-administered medications for this visit.     Patient Active Problem List    Diagnosis Date Noted    Agatston CAC score 200-399 11/06/2024    Exertional dyspnea 11/05/2024    Reactive airway disease 11/05/2024    Bone lesion 02/23/2024    Pain in both knees 02/08/2024    Travel advice encounter 02/08/2024    BMI 32.0-32.9,adult 05/22/2023    Urinary retention 08/30/2022    Mixed hyperlipidemia 03/21/2018     Past Surgical History:   Procedure Laterality Date    [place] TRANSURETHRAL ELEC-SURG PROSTATECTOM  12/6/2022    Procedure: BIPOLAR TRANSURETHRAL RESECTION OF PROSTATE;  Surgeon: Phillip Higgins M.D.;  Location: SURGERY [facility];  Service: Urology    VENTRAL HERNIA REPAIR  04/22/2015    Performed by Sánchez Mayorga M.D. at SURGERY [facility] ORS    COLONOSCOPY  01/01/2013    X 2    OTHER ORTHOPEDIC SURGERY    "   fx finger/ age 15      Social History     Tobacco Use    Smoking status: Former     Current packs/day: 0.00     Average packs/day: 1 pack/day for 25.3 years (25.3 ttl pk-yrs)     Types: Cigarettes     Start date: 10/1/1964     Quit date: 1990     Years since quittin.0    Smokeless tobacco: Never   Vaping Use    Vaping status: Never Used   Substance Use Topics    Alcohol use: Yes     Comment: very occasional    Drug use: Yes     Types: Marijuana     Comment: occasional Cannabis edible rarely     Family History   Problem Relation Age of Onset    Glaucoma Mother     Diabetes Father     Heart Disease Father     Hypertension Father     Hyperlipidemia Father     Stroke Father     Heart Attack Neg Hx            ROS    all review of system completed and negative except for those listed above     Objective:     /64 (BP Location: Left arm, Patient Position: Sitting, BP Cuff Size: Adult)   Pulse 79   Temp 36.5 °C (97.7 °F) (Temporal)   Resp 20   Ht 1.74 m (5' 8.5\")   Wt 90.7 kg (200 lb)   SpO2 97%  Body mass index is 29.97 kg/m².  Physical Exam:        GEN: comfortable, alert and oriented, well nourished, well developed, in no apparent distress   HEENT: NCAT, eyes: pupils equal and reactive, sclera white, EOMIT, good dentition  HEART: limbs warm and well perfused, regular rate, no JVD, no lower extremity edema  LUNGS: speaking in full sentences, not in apparent respiratory distress, no audible wheezes  MSK: normal tone and bulk, no swelling of the joints, gait steady and normal       Assessment and Plan:   The following treatment plan was discussed        Assessment & Plan        Assessment & Plan  1. Weight loss.  He has demonstrated a commendable effort in weight loss, losing 13 pounds since the last visit. He is currently on Qsymia and reports minor side effects such as dry mouth, but no significant issues affecting his sleep or daily activities. He initially took a half dose for the first 2 weeks, " resulting in a 2-pound loss, and then increased to the full dose, which significantly suppressed his appetite. He is consuming around 1000 calories per day and supplementing with protein powder 2 to 3 times a day. He is also engaging in weightlifting exercises to maintain muscle mass. He is advised to increase his caloric intake to 4103-2872 calories per day to ensure adequate nutrition and prevent muscle loss. He is encouraged to continue his current regimen of Qsymia, even during his upcoming cruise, but it is acceptable if he forgets to take it occasionally. He is also advised to monitor for signs of gout or gallstones, such as unexplained joint pain, and seek immediate medical attention if these symptoms occur. He is advised to continue taking a multivitamin to prevent vitamin deficiencies. He is also advised to monitor his waist-to-hip ratio and waist circumference monthly as additional markers of cardiovascular health.    Follow-up  The patient will follow up in 1 month.          Instructed to follow up if symptoms worsen or fail to improve, ER/UC precautions discussed as well    Jesi Najera MD  [facility], Family Medicine   [address] [place] [address] [address]   [address] [place] [id number]  Phone: [phone]

## 2025-01-23 ENCOUNTER — HOSPITAL ENCOUNTER (OUTPATIENT)
Dept: LAB | Facility: MEDICAL CENTER | Age: 75
End: 2025-01-23
Attending: INTERNAL MEDICINE
Payer: MEDICARE

## 2025-01-23 DIAGNOSIS — E78.2 MIXED HYPERLIPIDEMIA: ICD-10-CM

## 2025-01-23 LAB
ALBUMIN SERPL BCP-MCNC: 4.5 G/DL (ref 3.2–4.9)
ALBUMIN/GLOB SERPL: 1.5 G/DL
ALP SERPL-CCNC: 94 U/L (ref 30–99)
ALT SERPL-CCNC: 16 U/L (ref 2–50)
ANION GAP SERPL CALC-SCNC: 12 MMOL/L (ref 7–16)
AST SERPL-CCNC: 27 U/L (ref 12–45)
BILIRUB SERPL-MCNC: 0.8 MG/DL (ref 0.1–1.5)
BUN SERPL-MCNC: 21 MG/DL (ref 8–22)
CALCIUM ALBUM COR SERPL-MCNC: 9.1 MG/DL (ref 8.5–10.5)
CALCIUM SERPL-MCNC: 9.5 MG/DL (ref 8.5–10.5)
CHLORIDE SERPL-SCNC: 108 MMOL/L (ref 96–112)
CHOLEST SERPL-MCNC: 101 MG/DL (ref 100–199)
CO2 SERPL-SCNC: 22 MMOL/L (ref 20–33)
CREAT SERPL-MCNC: 1.17 MG/DL (ref 0.5–1.4)
FASTING STATUS PATIENT QL REPORTED: NORMAL
GFR SERPLBLD CREATININE-BSD FMLA CKD-EPI: 66 ML/MIN/1.73 M 2
GLOBULIN SER CALC-MCNC: 3 G/DL (ref 1.9–3.5)
GLUCOSE SERPL-MCNC: 95 MG/DL (ref 65–99)
HDLC SERPL-MCNC: 43 MG/DL
LDLC SERPL CALC-MCNC: 45 MG/DL
POTASSIUM SERPL-SCNC: 4.5 MMOL/L (ref 3.6–5.5)
PROT SERPL-MCNC: 7.5 G/DL (ref 6–8.2)
SODIUM SERPL-SCNC: 142 MMOL/L (ref 135–145)
TRIGL SERPL-MCNC: 65 MG/DL (ref 0–149)

## 2025-01-23 PROCEDURE — 80061 LIPID PANEL: CPT

## 2025-01-23 PROCEDURE — 36415 COLL VENOUS BLD VENIPUNCTURE: CPT

## 2025-01-23 PROCEDURE — 80053 COMPREHEN METABOLIC PANEL: CPT

## 2025-01-23 RX ORDER — SCOPOLAMINE 1 MG/3D
1 PATCH, EXTENDED RELEASE TRANSDERMAL
Qty: 12 PATCH | Refills: 3 | Status: SHIPPED | OUTPATIENT
Start: 2025-01-23

## 2025-02-05 ENCOUNTER — OFFICE VISIT (OUTPATIENT)
Dept: CARDIOLOGY | Facility: MEDICAL CENTER | Age: 75
End: 2025-02-05
Attending: INTERNAL MEDICINE
Payer: MEDICARE

## 2025-02-05 VITALS
OXYGEN SATURATION: 98 % | RESPIRATION RATE: 16 BRPM | HEIGHT: 69 IN | BODY MASS INDEX: 29.77 KG/M2 | WEIGHT: 201 LBS | HEART RATE: 76 BPM | SYSTOLIC BLOOD PRESSURE: 118 MMHG | DIASTOLIC BLOOD PRESSURE: 72 MMHG

## 2025-02-05 DIAGNOSIS — E78.2 MIXED HYPERLIPIDEMIA: ICD-10-CM

## 2025-02-05 DIAGNOSIS — R93.1 AGATSTON CAC SCORE 200-399: ICD-10-CM

## 2025-02-05 PROCEDURE — 3078F DIAST BP <80 MM HG: CPT | Performed by: INTERNAL MEDICINE

## 2025-02-05 PROCEDURE — 3074F SYST BP LT 130 MM HG: CPT | Performed by: INTERNAL MEDICINE

## 2025-02-05 PROCEDURE — 99214 OFFICE O/P EST MOD 30 MIN: CPT | Performed by: INTERNAL MEDICINE

## 2025-02-05 PROCEDURE — 99212 OFFICE O/P EST SF 10 MIN: CPT | Performed by: INTERNAL MEDICINE

## 2025-02-05 ASSESSMENT — ENCOUNTER SYMPTOMS
CLAUDICATION: 0
DEPRESSION: 0
HEADACHES: 0
RESPIRATORY NEGATIVE: 1
NERVOUS/ANXIOUS: 0
NAUSEA: 0
GASTROINTESTINAL NEGATIVE: 1
MYALGIAS: 0
EYES NEGATIVE: 1
FEVER: 0
BRUISES/BLEEDS EASILY: 0
ABDOMINAL PAIN: 0
PSYCHIATRIC NEGATIVE: 1
CONSTITUTIONAL NEGATIVE: 1
PALPITATIONS: 0
WEIGHT LOSS: 0
MUSCULOSKELETAL NEGATIVE: 1
VOMITING: 0
NEUROLOGICAL NEGATIVE: 1
BLURRED VISION: 0
SHORTNESS OF BREATH: 0
DOUBLE VISION: 0
CHILLS: 0
FOCAL WEAKNESS: 0
WEAKNESS: 0
DIZZINESS: 0
COUGH: 0
CARDIOVASCULAR NEGATIVE: 1

## 2025-02-05 ASSESSMENT — FIBROSIS 4 INDEX: FIB4 SCORE: 2.34

## 2025-02-05 NOTE — PROGRESS NOTES
Chief Complaint   Patient presents with    Hyperlipidemia    Follow-Up    Other     F/V Dx: Exertional dyspnea       Subjective     Uday Osman is a 74 y.o. male who presents today for follow up of shortness of breath, positive CT coronary calcium study, dyslipidemia, medication change evaluation.    Since the patient's last visit on 11/06/24, he has been doing well clinically from cardiac standpoint. He admit to improvement of his shortness of breath. He denies fatigue, chest pain, palpitations, lower extremity edema, dizziness or syncope. On the last visit his atorvastatin dose was increased and his cholesterol level is better controlled. He keeps active exercising.    Past Medical History:   Diagnosis Date    Diverticulosis of large intestine without hemorrhage 03/21/2018    Elevated liver enzymes 06/16/2022    Fever and chills 01/06/2023    Fungus infection 12/23/2021    High cholesterol     Left anterior knee pain 07/28/2021    Paronychia of fourth toe 03/14/2023    Prostatitis 01/06/2023    Reactive airway disease 11/5/2024    Shortness of breath     Snoring     SOB (shortness of breath) 07/03/2024    Urinary bladder disorder March 2020    Urinary Retention    UTI due to extended-spectrum beta lactamase (ESBL) producing Escherichia coli 01/06/2023    Ventral hernia 04/22/2015    ICD-10 transition     Past Surgical History:   Procedure Laterality Date    [place] TRANSURETHRAL ELEC-SURG PROSTATECTOM  12/6/2022    Procedure: BIPOLAR TRANSURETHRAL RESECTION OF PROSTATE;  Surgeon: Phillip Higgins M.D.;  Location: SURGERY [facility];  Service: Urology    VENTRAL HERNIA REPAIR  04/22/2015    Performed by Sánchez Mayorga M.D. at SURGERY [facility] ORS    COLONOSCOPY  01/01/2013    X 2    OTHER ORTHOPEDIC SURGERY      fx finger/ age 15     Family History   Problem Relation Age of Onset    Glaucoma Mother     Diabetes Father     Heart Disease Father     Hypertension Father     Hyperlipidemia Father     Stroke  Father     Heart Attack Neg Hx      Social History     Socioeconomic History    Marital status:      Spouse name: Not on file    Number of children: Not on file    Years of education: Not on file    Highest education level: Associate degree: occupational, technical, or vocational program   Occupational History    Not on file   Tobacco Use    Smoking status: Former     Current packs/day: 0.00     Average packs/day: 1 pack/day for 25.3 years (25.3 ttl pk-yrs)     Types: Cigarettes     Start date: 10/1/1964     Quit date: 1990     Years since quittin.1    Smokeless tobacco: Never   Vaping Use    Vaping status: Never Used   Substance and Sexual Activity    Alcohol use: Yes     Comment: very occasional    Drug use: Yes     Types: Marijuana     Comment: occasional Cannabis edible rarely    Sexual activity: Not Currently   Other Topics Concern    Not on file   Social History Narrative    Not on file     Social Drivers of Health     Financial Resource Strain: Low Risk  (2024)    Overall Financial Resource Strain (CARDIA)     Difficulty of Paying Living Expenses: Not hard at all   Food Insecurity: No Food Insecurity (2024)    Hunger Vital Sign     Worried About Running Out of Food in the Last Year: Never true     Ran Out of Food in the Last Year: Never true   Transportation Needs: No Transportation Needs (2024)    PRAPARE - Transportation     Lack of Transportation (Medical): No     Lack of Transportation (Non-Medical): No   Physical Activity: Sufficiently Active (2024)    Exercise Vital Sign     Days of Exercise per Week: 4 days     Minutes of Exercise per Session: 60 min   Stress: No Stress Concern Present (2024)    [employer] [place] [employer] - Occupational Stress Questionnaire     Feeling of Stress : Not at all   Social Connections: Moderately Integrated (2024)    Social Connection and Isolation Panel [NHANES]     Frequency of Communication with Friends and  Family: More than three times a week     Frequency of Social Gatherings with Friends and Family: Never     Attends Protestant Services: Never     Active Member of Clubs or Organizations: No     Attends Club or Organization Meetings: 1 to 4 times per year     Marital Status:    Intimate Partner Violence: Not At Risk (12/14/2022)    Humiliation, Afraid, Rape, and Kick questionnaire     Fear of Current or Ex-Partner: No     Emotionally Abused: No     Physically Abused: No     Sexually Abused: No   Housing Stability: Low Risk  (1/31/2024)    Housing Stability Vital Sign     Unable to Pay for Housing in the Last Year: No     Number of Places Lived in the Last Year: 1     Unstable Housing in the Last Year: No     No Known Allergies  (Medications reviewed.)  Outpatient Encounter Medications as of 2/5/2025   Medication Sig Dispense Refill    Phentermine-Topiramate (QSYMIA) 7.5-46 MG CAPSULE SR 24 HR Take 1 Tablet by mouth every day for 45 days. 45 Capsule 2    atorvastatin (LIPITOR) 80 MG tablet Take 1 Tablet by mouth every evening. 90 Tablet 3    fluticasone-salmeterol (ADVAIR) 100-50 MCG/ACT AEROSOL POWDER, BREATH ACTIVATED Inhale 1 Puff 2 times a day. 1 Each 11    Probiotic Product (PROBIOTIC DAILY PO) Take  by mouth.      Omega-3 Fatty Acids (FISH OIL) 1000 MG Cap capsule Take 1,000 mg by mouth every day.      Cholecalciferol (VITAMIN D3 PO) Take 1 Tablet by mouth every day.      cetirizine (ZYRTEC) 10 MG Tab Take 10 mg by mouth every day.      ascorbic acid (ASCORBIC ACID) 500 MG TABS Take 1,500 mg by mouth every day.      scopolamine (TRANSDERM SCOP, 1.5 MG,) 1 mg/72hr PATCH 72 HR Place 1 Patch on the skin every 72 hours. 12 Patch 3     No facility-administered encounter medications on file as of 2/5/2025.     Review of Systems   Constitutional: Negative.  Negative for chills, fever, malaise/fatigue and weight loss.   HENT: Negative.  Negative for hearing loss.    Eyes: Negative.  Negative for blurred vision and  "double vision.   Respiratory: Negative.  Negative for cough and shortness of breath.    Cardiovascular: Negative.  Negative for chest pain, palpitations, claudication and leg swelling.   Gastrointestinal: Negative.  Negative for abdominal pain, nausea and vomiting.   Genitourinary: Negative.  Negative for dysuria and urgency.   Musculoskeletal: Negative.  Negative for joint pain and myalgias.   Skin: Negative.  Negative for itching and rash.   Neurological: Negative.  Negative for dizziness, focal weakness, weakness and headaches.   Endo/Heme/Allergies: Negative.  Does not bruise/bleed easily.   Psychiatric/Behavioral: Negative.  Negative for depression. The patient is not nervous/anxious.               Objective     /72 (BP Location: Left arm, Patient Position: Sitting, BP Cuff Size: Adult)   Pulse 76   Resp 16   Ht 1.74 m (5' 8.5\")   Wt 91.2 kg (201 lb)   SpO2 98%   BMI 30.12 kg/m²     Physical Exam  Constitutional:       Appearance: Normal appearance. He is well-developed and normal weight.   HENT:      Head: Normocephalic and atraumatic.   Neck:      Vascular: No JVD.   Cardiovascular:      Rate and Rhythm: Normal rate and regular rhythm.      Heart sounds: Normal heart sounds.   Pulmonary:      Effort: Pulmonary effort is normal.      Breath sounds: Normal breath sounds.   Abdominal:      General: Bowel sounds are normal.      Palpations: Abdomen is soft.      Comments: No hepatosplenomegaly.   Musculoskeletal:         General: Normal range of motion.   Lymphadenopathy:      Cervical: No cervical adenopathy.   Skin:     General: Skin is warm and dry.   Neurological:      Mental Status: He is alert and oriented to person, place, and time.            CARDIAC STUDIES/PROCEDURES:     ABDOMINAL ULTRASOUND (02/24/17)  Normal abdominal aortic ultrasound for age.      CT CARDIAC SCORING (08/21/24)  Coronary calcification:  LMA - 0.0  LCX - 34.6  LAD - 258.9  RCA - 0.0  Total Calcium Score: " 293.5  Percentile: Calcium score is above the 25th percentile for the patient's age and sex.  IMPRESSION:  Calcium Score of 100-399 AND <75th percentile:    ECHOCARDIOGRAM CONCLUSIONS (07/25/24)  No prior study is available for comparison.   Normal left ventricular systolic function.  The left ventricular ejection fraction is visually estimated to be 60%.  No significant valvular abnormalities.     EKG performed on (07/03/24) EKG shows sinus rhythm.      Laboratory results of (01/23/25) were reviewed. Cholesterol profile of 101/65/43/45 mg/dL noted.  Laboratory results of (09/20/24) Cholesterol profile of 156/10/41/94 mg/dL noted.  Laboratory results of (02/03/24) Cholesterol profile of 157/103/43/93 mg/dL noted.     MYOCARDIAL PERFUSION STUDY CONCLUSIONS (07/25/24)  NUCLEAR IMAGING INTERPRETATION  Normal exercise myocardial perfusion study.  No evidence of ischemia or infarct.  SDS 0.  LVEF 60%.  Nonspecific EKG changes noted with stress.  Good exercise tolerance.  Kervin 07:05, 8.9 METs.   Occasional PVCs.  ECG INTERPRETATION  Nonspecific EKG changes noted with stress.    Assessment & Plan     1. Agatston CAC score 200-399        2. Mixed hyperlipidemia            Medical Decision Making: Today's Assessment/Status/Plan:        Positive CT coronary calcium study: He is a 74 year old male with positive CT coronary calcium study, dyslipidemia. He is clinically doing well from coronary standpoint. We will continue with current medical therapy including atorvastatin.  Hyperlipidemia: He is doing well on statin therapy without myalgia symptoms.    We will see the patient as needed.    CC Jesi Rod

## 2025-02-25 ENCOUNTER — TELEPHONE (OUTPATIENT)
Dept: CARDIOLOGY | Facility: MEDICAL CENTER | Age: 75
End: 2025-02-25
Payer: MEDICARE

## 2025-02-25 DIAGNOSIS — E78.2 MIXED HYPERLIPIDEMIA: ICD-10-CM

## 2025-02-25 RX ORDER — ATORVASTATIN CALCIUM 80 MG/1
80 TABLET, FILM COATED ORAL NIGHTLY
Qty: 100 TABLET | Refills: 3 | Status: SHIPPED | OUTPATIENT
Start: 2025-02-25

## 2025-03-13 ENCOUNTER — OFFICE VISIT (OUTPATIENT)
Dept: SLEEP MEDICINE | Facility: MEDICAL CENTER | Age: 75
End: 2025-03-13
Payer: MEDICARE

## 2025-03-13 VITALS
OXYGEN SATURATION: 97 % | HEIGHT: 69 IN | RESPIRATION RATE: 16 BRPM | SYSTOLIC BLOOD PRESSURE: 124 MMHG | WEIGHT: 196 LBS | HEART RATE: 70 BPM | DIASTOLIC BLOOD PRESSURE: 78 MMHG | BODY MASS INDEX: 29.03 KG/M2

## 2025-03-13 DIAGNOSIS — R06.09 EXERTIONAL DYSPNEA: ICD-10-CM

## 2025-03-13 DIAGNOSIS — J45.20 MILD INTERMITTENT REACTIVE AIRWAY DISEASE WITHOUT COMPLICATION: Primary | ICD-10-CM

## 2025-03-13 PROCEDURE — 3074F SYST BP LT 130 MM HG: CPT

## 2025-03-13 PROCEDURE — 99211 OFF/OP EST MAY X REQ PHY/QHP: CPT

## 2025-03-13 PROCEDURE — 3078F DIAST BP <80 MM HG: CPT

## 2025-03-13 PROCEDURE — 99214 OFFICE O/P EST MOD 30 MIN: CPT

## 2025-03-13 RX ORDER — ALBUTEROL SULFATE 90 UG/1
2 INHALANT RESPIRATORY (INHALATION) EVERY 6 HOURS PRN
Qty: 8.5 G | Refills: 3 | Status: SHIPPED | OUTPATIENT
Start: 2025-03-13

## 2025-03-13 ASSESSMENT — ENCOUNTER SYMPTOMS
DEPRESSION: 0
FEVER: 0
HEMOPTYSIS: 0
SPUTUM PRODUCTION: 0
PALPITATIONS: 0
DIZZINESS: 0
STRIDOR: 0
CHILLS: 0
COUGH: 0
WEIGHT LOSS: 0
SHORTNESS OF BREATH: 0
WHEEZING: 0
HEARTBURN: 0

## 2025-03-13 ASSESSMENT — FIBROSIS 4 INDEX: FIB4 SCORE: 2.34

## 2025-03-13 ASSESSMENT — PATIENT HEALTH QUESTIONNAIRE - PHQ9: CLINICAL INTERPRETATION OF PHQ2 SCORE: 0

## 2025-03-13 NOTE — ASSESSMENT & PLAN NOTE
Albuterol as needed  PFT normal  PCP ok to follow up, patient does not need pulmonary at this time    Orders:    albuterol 108 (90 Base) MCG/ACT Aero Soln inhalation aerosol; Inhale 2 Puffs every 6 hours as needed for Shortness of Breath.

## 2025-03-18 ENCOUNTER — OFFICE VISIT (OUTPATIENT)
Dept: MEDICAL GROUP | Facility: MEDICAL CENTER | Age: 75
End: 2025-03-18
Payer: MEDICARE

## 2025-03-18 VITALS
WEIGHT: 201.94 LBS | OXYGEN SATURATION: 96 % | SYSTOLIC BLOOD PRESSURE: 118 MMHG | RESPIRATION RATE: 18 BRPM | HEIGHT: 69 IN | HEART RATE: 57 BPM | TEMPERATURE: 97.8 F | BODY MASS INDEX: 29.91 KG/M2 | DIASTOLIC BLOOD PRESSURE: 60 MMHG

## 2025-03-18 DIAGNOSIS — Z71.3 ENCOUNTER FOR WEIGHT LOSS COUNSELING: ICD-10-CM

## 2025-03-18 DIAGNOSIS — R53.83 OTHER FATIGUE: ICD-10-CM

## 2025-03-18 PROCEDURE — 3078F DIAST BP <80 MM HG: CPT | Performed by: FAMILY MEDICINE

## 2025-03-18 PROCEDURE — 99214 OFFICE O/P EST MOD 30 MIN: CPT | Performed by: FAMILY MEDICINE

## 2025-03-18 PROCEDURE — 3074F SYST BP LT 130 MM HG: CPT | Performed by: FAMILY MEDICINE

## 2025-03-18 RX ORDER — PHENTERMINE AND TOPIRAMATE 15; 92 MG/1; MG/1
1 CAPSULE, EXTENDED RELEASE ORAL DAILY
Qty: 90 CAPSULE | Refills: 3 | Status: SHIPPED | OUTPATIENT
Start: 2025-03-18 | End: 2025-06-16

## 2025-03-18 ASSESSMENT — FIBROSIS 4 INDEX: FIB4 SCORE: 2.34

## 2025-03-18 NOTE — PROGRESS NOTES
This medical record contains text that has been entered with the assistance of computer voice recognition and dictation software.  Therefore, it may contain unintended errors in text, spelling, punctuation, or grammar        Chief Complaint   Patient presents with    Weight Check     Discuss weight loss: pt got a rx and would like to increase the dosage because he feels he has not lost weight since the last visit.        Uday Osman is a 74 y.o. male here evaluation and management of:       History of Present Illness  The patient presents for weight management, shortness of breath, and medication management.    He reports a lack of progress in weight management, with his weight remaining stable since the last consultation on 01/21/2025. He expresses concern that his current medication, Qsymia, is not as effective in suppressing his appetite as it was initially, leading him to inquire about the possibility of increasing the dosage. He has approximately 30 days' worth of the current dosage remaining and does not have a prescription beyond this period. He recalls starting Qsymia around 12/2024, which initially resulted in a significant weight loss of 13 pounds. However, he notes that his weight loss has since plateaued. He maintains a regular exercise routine, attending the gym every other day and engaging in an hour of aerobics. He has recently incorporated weight machine exercises into his regimen but has discontinued using the StairMaster due to difficulty. Despite consuming a low-calorie diet of 1500 to 1700 calories per day and expending 2400 to 2500 calories daily through exercise, he has not observed any further weight loss. He acknowledges a decrease in protein intake compared to when he was actively losing weight. He has considered consulting a nutritionist but is uncertain if his insurance would cover this service.    He had his second appointment with his cardiologist, which went well, and he does  not need to see him again. His statin dosage was doubled, and he was given a year-long prescription. He referred himself to cardiology because he was having shortness of breath with exertion. He feels better about his shortness of breath now, attributing it to weight loss and improved fitness. He goes to the gym every other day and is up to doing an hour of aerobics now. He has started using the weight machine, although he has skipped the StairMaster as it is still hard for him.    He had his second visit with his pulmonologist, where he saw a nurse practitioner, and they agreed that he did not need to go see her again. He discussed the effects of COVID-19, which he had a year and a half ago, with his pulmonologist nurse, who suggested it may have had some effect on him. He told her that he did not think the inhaler he was prescribed did any good, so they discontinued it and gave him an emergency inhaler, which he filled but does not expect to ever use.    MEDICATIONS  Current: Qsymia, statin        Current Outpatient Medications   Medication Sig Dispense Refill    Phentermine-Topiramate (QSYMIA) 15-92 MG CAPSULE SR 24 HR Take 1 Tablet by mouth every day for 90 days. 90 Capsule 3    albuterol 108 (90 Base) MCG/ACT Aero Soln inhalation aerosol Inhale 2 Puffs every 6 hours as needed for Shortness of Breath. 8.5 g 3    atorvastatin (LIPITOR) 80 MG tablet Take 1 Tablet by mouth every evening. 100 Tablet 3    fluticasone-salmeterol (ADVAIR) 100-50 MCG/ACT AEROSOL POWDER, BREATH ACTIVATED Inhale 1 Puff 2 times a day. 1 Each 11    Probiotic Product (PROBIOTIC DAILY PO) Take  by mouth.      Omega-3 Fatty Acids (FISH OIL) 1000 MG Cap capsule Take 1,000 mg by mouth every day.      Cholecalciferol (VITAMIN D3 PO) Take 1 Tablet by mouth every day.      cetirizine (ZYRTEC) 10 MG Tab Take 10 mg by mouth every day.      ascorbic acid (ASCORBIC ACID) 500 MG TABS Take 1,500 mg by mouth every day.       No current  "facility-administered medications for this visit.     Patient Active Problem List    Diagnosis Date Noted    Agatston CAC score 200-399 2024    Exertional dyspnea 2024    Reactive airway disease 2024    Bone lesion 2024    Pain in both knees 2024    Encounter for weight loss counseling 2024    BMI 32.0-32.9,adult 2023    Urinary retention 2022    Mixed hyperlipidemia 2018     Past Surgical History:   Procedure Laterality Date    AL TRANSURETHRAL ELEC-SURG PROSTATECTOM  2022    Procedure: BIPOLAR TRANSURETHRAL RESECTION OF PROSTATE;  Surgeon: Phillip Higgnis M.D.;  Location: SURGERY [facility];  Service: Urology    VENTRAL HERNIA REPAIR  2015    Performed by Sánchez Mayorga M.D. at SURGERY [facility]    COLONOSCOPY  01/01/2013    X 2    HERNIA REPAIR      OPEN REDUCTION      OTHER ORTHOPEDIC SURGERY      fx finger/ age 15    PROSTATECTOMY, RADICAL RETRO      VASECTOMY        Social History     Tobacco Use    Smoking status: Former     Current packs/day: 0.00     Average packs/day: 1 pack/day for 25.3 years (25.3 ttl pk-yrs)     Types: Cigarettes     Start date: 10/1/1964     Quit date: 1990     Years since quittin.2    Smokeless tobacco: Never   Vaping Use    Vaping status: Never Used   Substance Use Topics    Alcohol use: Yes     Comment: very occasional    Drug use: Yes     Types: Marijuana     Comment: occasional Cannabis edible rarely     Family History   Problem Relation Age of Onset    Glaucoma Mother     Diabetes Father     Heart Disease Father     Hypertension Father     Hyperlipidemia Father     Stroke Father     Heart Attack Neg Hx            ROS    all review of system completed and negative except for those listed above     Objective:     /60 (BP Location: Left arm, Patient Position: Sitting, BP Cuff Size: Adult)   Pulse (!) 57   Temp 36.6 °C (97.8 °F) (Temporal)   Resp 18   Ht 1.74 m (5' 8.5\")   Wt 91.6 kg (201 " lb 15.1 oz)   SpO2 96%  Body mass index is 30.26 kg/m².  Physical Exam:        GEN: comfortable, alert and oriented, well nourished, well developed, in no apparent distress   HEENT: NCAT, eyes: pupils equal and reactive, sclera white, EOMIT, good dentition  HEART: limbs warm and well perfused, regular rate, no JVD, no lower extremity edema  LUNGS: speaking in full sentences, not in apparent respiratory distress, no audible wheezes  MSK: normal tone and bulk, no swelling of the joints, gait steady and normal       Assessment and Plan:   The following treatment plan was discussed        Assessment & Plan  Encounter for weight loss counseling    Orders:    Phentermine-Topiramate (QSYMIA) 15-92 MG CAPSULE SR 24 HR; Take 1 Tablet by mouth every day for 90 days.    TSH; Future    Referral to Other    Other fatigue    Orders:    TSH; Future        Assessment & Plan  1. Weight management.  He has been on Qsymia since December 2024 and initially lost 13 pounds but has since plateaued. He is advised to increase his protein intake and consider consulting with a registered dietitian at the [facility] for further dietary guidance. The dosage of Qsymia will be increased to enhance weight loss efforts.    2. Shortness of breath.  He reports improvement in his shortness of breath, likely due to weight loss and increased physical activity. He has been deemed stable from a cardiopulmonary standpoint by both his cardiologist and pulmonologist.    3. Medication management.  His cardiologist has doubled his statin dosage and provided a year-long prescription. He will follow up with his primary care provider for a refill when needed.    Follow-up  The patient will follow up next month for his annual wellness visit.          Instructed to follow up if symptoms worsen or fail to improve, ER/UC precautions discussed as well    Jesi Najera MD  [facility], Family Medicine   [address] [place] [address] [address]kw[address]   Mike DIMAS [id number]  Phone:  [phone]

## 2025-03-18 NOTE — ASSESSMENT & PLAN NOTE
Orders:    Phentermine-Topiramate (QSYMIA) 15-92 MG CAPSULE SR 24 HR; Take 1 Tablet by mouth every day for 90 days.    TSH; Future    Referral to Other

## 2025-03-25 ENCOUNTER — APPOINTMENT (OUTPATIENT)
Dept: MEDICAL GROUP | Facility: MEDICAL CENTER | Age: 75
End: 2025-03-25
Payer: MEDICARE

## 2025-04-04 ENCOUNTER — HOSPITAL ENCOUNTER (OUTPATIENT)
Dept: LAB | Facility: MEDICAL CENTER | Age: 75
End: 2025-04-04
Attending: FAMILY MEDICINE
Payer: MEDICARE

## 2025-04-04 DIAGNOSIS — R53.83 OTHER FATIGUE: ICD-10-CM

## 2025-04-04 DIAGNOSIS — Z71.3 ENCOUNTER FOR WEIGHT LOSS COUNSELING: ICD-10-CM

## 2025-04-04 LAB — TSH SERPL-ACNC: 1.49 UIU/ML (ref 0.38–5.33)

## 2025-04-04 PROCEDURE — 84443 ASSAY THYROID STIM HORMONE: CPT

## 2025-04-04 PROCEDURE — 36415 COLL VENOUS BLD VENIPUNCTURE: CPT

## 2025-04-20 SDOH — ECONOMIC STABILITY: INCOME INSECURITY: IN THE LAST 12 MONTHS, WAS THERE A TIME WHEN YOU WERE NOT ABLE TO PAY THE MORTGAGE OR RENT ON TIME?: NO

## 2025-04-20 SDOH — ECONOMIC STABILITY: INCOME INSECURITY: HOW HARD IS IT FOR YOU TO PAY FOR THE VERY BASICS LIKE FOOD, HOUSING, MEDICAL CARE, AND HEATING?: NOT HARD AT ALL

## 2025-04-20 SDOH — HEALTH STABILITY: PHYSICAL HEALTH: ON AVERAGE, HOW MANY MINUTES DO YOU ENGAGE IN EXERCISE AT THIS LEVEL?: 50 MIN

## 2025-04-20 SDOH — ECONOMIC STABILITY: FOOD INSECURITY: WITHIN THE PAST 12 MONTHS, THE FOOD YOU BOUGHT JUST DIDN'T LAST AND YOU DIDN'T HAVE MONEY TO GET MORE.: NEVER TRUE

## 2025-04-20 SDOH — HEALTH STABILITY: PHYSICAL HEALTH: ON AVERAGE, HOW MANY DAYS PER WEEK DO YOU ENGAGE IN MODERATE TO STRENUOUS EXERCISE (LIKE A BRISK WALK)?: 4 DAYS

## 2025-04-20 SDOH — ECONOMIC STABILITY: FOOD INSECURITY: WITHIN THE PAST 12 MONTHS, YOU WORRIED THAT YOUR FOOD WOULD RUN OUT BEFORE YOU GOT MONEY TO BUY MORE.: NEVER TRUE

## 2025-04-20 ASSESSMENT — SOCIAL DETERMINANTS OF HEALTH (SDOH)
HOW HARD IS IT FOR YOU TO PAY FOR THE VERY BASICS LIKE FOOD, HOUSING, MEDICAL CARE, AND HEATING?: NOT HARD AT ALL
HOW OFTEN DO YOU HAVE A DRINK CONTAINING ALCOHOL: MONTHLY OR LESS
HOW OFTEN DO YOU ATTENT MEETINGS OF THE CLUB OR ORGANIZATION YOU BELONG TO?: NEVER
HOW OFTEN DO YOU ATTENT MEETINGS OF THE CLUB OR ORGANIZATION YOU BELONG TO?: NEVER
HOW OFTEN DO YOU ATTEND CHURCH OR RELIGIOUS SERVICES?: NEVER
HOW MANY DRINKS CONTAINING ALCOHOL DO YOU HAVE ON A TYPICAL DAY WHEN YOU ARE DRINKING: 1 OR 2
WITHIN THE PAST 12 MONTHS, YOU WORRIED THAT YOUR FOOD WOULD RUN OUT BEFORE YOU GOT THE MONEY TO BUY MORE: NEVER TRUE
IN THE PAST 12 MONTHS, HAS THE ELECTRIC, GAS, OIL, OR WATER COMPANY THREATENED TO SHUT OFF SERVICE IN YOUR HOME?: NO
IN A TYPICAL WEEK, HOW MANY TIMES DO YOU TALK ON THE PHONE WITH FAMILY, FRIENDS, OR NEIGHBORS?: NEVER
DO YOU BELONG TO ANY CLUBS OR ORGANIZATIONS SUCH AS CHURCH GROUPS UNIONS, FRATERNAL OR ATHLETIC GROUPS, OR SCHOOL GROUPS?: NO
IN A TYPICAL WEEK, HOW MANY TIMES DO YOU TALK ON THE PHONE WITH FAMILY, FRIENDS, OR NEIGHBORS?: NEVER
HOW OFTEN DO YOU HAVE SIX OR MORE DRINKS ON ONE OCCASION: NEVER
HOW OFTEN DO YOU GET TOGETHER WITH FRIENDS OR RELATIVES?: ONCE A WEEK
HOW OFTEN DO YOU ATTEND CHURCH OR RELIGIOUS SERVICES?: NEVER
HOW OFTEN DO YOU GET TOGETHER WITH FRIENDS OR RELATIVES?: ONCE A WEEK
DO YOU BELONG TO ANY CLUBS OR ORGANIZATIONS SUCH AS CHURCH GROUPS UNIONS, FRATERNAL OR ATHLETIC GROUPS, OR SCHOOL GROUPS?: NO

## 2025-04-20 ASSESSMENT — LIFESTYLE VARIABLES
AUDIT-C TOTAL SCORE: 1
HOW MANY STANDARD DRINKS CONTAINING ALCOHOL DO YOU HAVE ON A TYPICAL DAY: 1 OR 2
SKIP TO QUESTIONS 9-10: 1
HOW OFTEN DO YOU HAVE SIX OR MORE DRINKS ON ONE OCCASION: NEVER
HOW OFTEN DO YOU HAVE A DRINK CONTAINING ALCOHOL: MONTHLY OR LESS

## 2025-04-24 ENCOUNTER — OFFICE VISIT (OUTPATIENT)
Dept: MEDICAL GROUP | Facility: MEDICAL CENTER | Age: 75
End: 2025-04-24
Payer: MEDICARE

## 2025-04-24 VITALS
WEIGHT: 191.69 LBS | BODY MASS INDEX: 28.39 KG/M2 | OXYGEN SATURATION: 97 % | DIASTOLIC BLOOD PRESSURE: 66 MMHG | HEART RATE: 76 BPM | HEIGHT: 69 IN | SYSTOLIC BLOOD PRESSURE: 110 MMHG | TEMPERATURE: 97.2 F

## 2025-04-24 DIAGNOSIS — Z00.00 MEDICARE ANNUAL WELLNESS VISIT, SUBSEQUENT: ICD-10-CM

## 2025-04-24 DIAGNOSIS — Z71.3 ENCOUNTER FOR WEIGHT LOSS COUNSELING: ICD-10-CM

## 2025-04-24 RX ORDER — PHENTERMINE AND TOPIRAMATE 7.5; 46 MG/1; MG/1
1 CAPSULE, EXTENDED RELEASE ORAL DAILY
Qty: 90 CAPSULE | Refills: 3 | Status: SHIPPED | OUTPATIENT
Start: 2025-04-24 | End: 2025-07-23

## 2025-04-24 ASSESSMENT — FIBROSIS 4 INDEX: FIB4 SCORE: 2.34

## 2025-04-24 ASSESSMENT — ACTIVITIES OF DAILY LIVING (ADL): BATHING_REQUIRES_ASSISTANCE: 0

## 2025-04-24 ASSESSMENT — PATIENT HEALTH QUESTIONNAIRE - PHQ9: CLINICAL INTERPRETATION OF PHQ2 SCORE: 0

## 2025-04-24 ASSESSMENT — ENCOUNTER SYMPTOMS: GENERAL WELL-BEING: GOOD

## 2025-04-24 NOTE — PROGRESS NOTES
Chief Complaint   Patient presents with    Annual Exam       HPI:  Uday Osman is a 74 y.o. here for Medicare Annual Wellness Visit     Patient Active Problem List    Diagnosis Date Noted    Agatston CAC score 200-399 11/06/2024    Exertional dyspnea 11/05/2024    Reactive airway disease 11/05/2024    Bone lesion 02/23/2024    Pain in both knees 02/08/2024    Encounter for weight loss counseling 02/08/2024    BMI 32.0-32.9,adult 05/22/2023    Urinary retention 08/30/2022    Mixed hyperlipidemia 03/21/2018       Current Outpatient Medications   Medication Sig Dispense Refill    Phentermine-Topiramate (QSYMIA) 15-92 MG CAPSULE SR 24 HR Take 1 Tablet by mouth every day for 90 days. 90 Capsule 3    albuterol 108 (90 Base) MCG/ACT Aero Soln inhalation aerosol Inhale 2 Puffs every 6 hours as needed for Shortness of Breath. 8.5 g 3    atorvastatin (LIPITOR) 80 MG tablet Take 1 Tablet by mouth every evening. 100 Tablet 3    fluticasone-salmeterol (ADVAIR) 100-50 MCG/ACT AEROSOL POWDER, BREATH ACTIVATED Inhale 1 Puff 2 times a day. 1 Each 11    Probiotic Product (PROBIOTIC DAILY PO) Take  by mouth.      Omega-3 Fatty Acids (FISH OIL) 1000 MG Cap capsule Take 1,000 mg by mouth every day.      Cholecalciferol (VITAMIN D3 PO) Take 1 Tablet by mouth every day.      cetirizine (ZYRTEC) 10 MG Tab Take 10 mg by mouth every day.      ascorbic acid (ASCORBIC ACID) 500 MG TABS Take 1,500 mg by mouth every day.       No current facility-administered medications for this visit.          Current supplements as per medication list.     Allergies: Patient has no known allergies.    Current social contact/activities:      He  reports that he quit smoking about 35 years ago. His smoking use included cigarettes. He started smoking about 60 years ago. He has a 25.3 pack-year smoking history. He has never used smokeless tobacco. He reports current alcohol use. He reports current drug use. Drug: Marijuana.  Counseling given: Not  Answered      ROS:    Gait: Uses no assistive device  Ostomy: No  Other tubes: No  Amputations: No  Chronic oxygen use: No  Last eye exam: 2 years ago  Wears hearing aids: No   : Denies any urinary leakage during the last 6 months    Screening:    Depression Screening  Little interest or pleasure in doing things?  0 - not at all  Feeling down, depressed , or hopeless? 0 - not at all  Patient Health Questionnaire Score: 0     If depressive symptoms identified deferred to follow up visit unless specifically addressed in assessment and plan.    Interpretation of PHQ-9 Total Score   Score Severity   1-4 No Depression   5-9 Mild Depression   10-14 Moderate Depression   15-19 Moderately Severe Depression   20-27 Severe Depression    Screening for Cognitive Impairment  Do you or any of your friends or family members have any concern about your memory? No  Three Minute Recall (Village, Kitchen, Baby) 3/3    Juan Manuel clock face with all 12 numbers and set the hands to show 10 minutes past 11.  Yes    Cognitive concerns identified deferred for follow up unless specifically addressed in assessment and plan.    Fall Risk Assessment  Has the patient had two or more falls in the last year or any fall with injury in the last year?  No    Safety Assessment  Do you always wear your seatbelt?  Yes  Any changes to home needed to function safely? No  Difficulty hearing.  Yes  Patient counseled about all safety risks that were identified.    Functional Assessment ADLs  Are there any barriers preventing you from cooking for yourself or meeting nutritional needs?  No.    Are there any barriers preventing you from driving safely or obtaining transportation?  No.    Are there any barriers preventing you from using a telephone or calling for help?  No    Are there any barriers preventing you from shopping?  No.    Are there any barriers preventing you from taking care of your own finances?  No    Are there any barriers preventing you from  managing your medications?  No    Are there any barriers preventing you from showering, bathing or dressing yourself? No    Are there any barriers preventing you from doing housework or laundry? No  Are there any barriers preventing you from using the toilet?No  Are you currently engaging in any exercise or physical activity?  Yes.      Self-Assessment of Health  What is your perception of your health? Good    Do you sleep more than six hours a night? Yes    In the past 7 days, how much did pain keep you from doing your normal work? None    Do you spend quality time with family or friends (virtually or in person)? Yes    Do you usually eat a heart healthy diet that constists of a variety of fruits, vegetables, whole grains and fiber? No    Do you eat foods high in fat and/or Fast Food more than three times per week? No    How concerned are you that your medical conditions are not being well managed? Not at all    Are you worried that in the next 2 months, you may not have stable housing that you own, rent, or stay in as part of a household? No      Advance Care Planning  Do you have an Advance Directive, Living Will, Durable Power of , or POLST? Yes  Advance Directive Living Will Durable Power of  POLST is not on file - instructed patient to bring in a copy to scan into their chart      Health Maintenance Summary            Current Care Gaps       Annual Wellness Visit (Yearly) Overdue since 1/31/2025 01/31/2024  Level of Service: CT ANNUAL WELLNESS VISIT-INCLUDES PPPS SUBSEQUE*    06/16/2023  Visit Dx: Medicare annual wellness visit, subsequent    05/22/2023  Level of Service: CT ANNUAL WELLNESS VISIT-INCLUDES PPPS SUBSEQUE*    06/16/2022  Visit Dx: Medicare annual wellness visit, subsequent    06/17/2021  Visit Dx: Medicare annual wellness visit, subsequent     Only the first 5 history entries have been loaded, but more history exists.                    Needs Review       Abdominal Aortic  Aneurysm (AAA) Screening  Tentatively Complete      03/11/2021  US-ABDOMEN COMPLETE SURVEY    02/24/2017  US-AORTA              Hepatitis C Screening  Tentatively Complete      09/29/2022  Hepatitis C Antibody component of HEPATITIS PANEL ACUTE(4 COMPONENTS)    08/18/2022  Hepatitis C Antibody component of HEPATITIS PANEL ACUTE(4 COMPONENTS)    06/25/2020  Hepatitis C Antibody component of HEP C VIRUS ANTIBODY              Colorectal Cancer Screening (Colonoscopy - Every 5 Years) Tentatively due on 5/19/2028 06/08/2023  OCCULT BLOOD FECES IMMUNOASSAY    05/19/2023  AMB EXTERNAL COLONOSCOPY RESULTS    05/19/2023  AMB EXTERNAL COLONOSCOPY RESULTS    03/11/2023  OCCULT BLOOD FECES IMMUNOASSAY    08/31/2022  OCCULT BLOOD FECES IMMUNOASSAY      Only the first 5 history entries have been loaded, but more history exists.                      Upcoming       IMM DTaP/Tdap/Td Vaccine (3 - Td or Tdap) Next due on 9/15/2030      09/15/2020  Imm Admin: Tdap Vaccine    05/19/2010  Imm Admin: Tdap Vaccine                      Completed or No Longer Recommended       Hepatitis B Vaccine (Hep B) (Series Information) Completed      03/14/2025  Imm Admin: Hep A/HEP B Combined Vaccine (TwinRix)    03/23/2024  Imm Admin: Hep A/HEP B Combined Vaccine (TwinRix)    02/24/2024  Imm Admin: Hep A/HEP B Combined Vaccine (TwinRix)              Influenza Vaccine (Series Information) Completed      09/16/2024  Imm Admin: Influenza, unspecified formulation    09/29/2023  Imm Admin: Influenza, Unspecified - HISTORICAL DATA    10/12/2022  Imm Admin: Influenza, Unspecified - HISTORICAL DATA    10/01/2021  Imm Admin: Influenza Vaccine Quad Inj (Preserved)    10/08/2020  Imm Admin: Influenza Vaccine Adult HD      Only the first 5 history entries have been loaded, but more history exists.              Zoster (Shingles) Vaccines (Series Information) Completed      09/02/2021  Imm Admin: Zoster Vaccine Recombinant (RZV) (SHINGRIX)    06/17/2021  Imm  Admin: Zoster Vaccine Recombinant (RZV) (SHINGRIX)    05/12/2016  Imm Admin: Zoster Vaccine Live (ZVL) (Zostavax) - HISTORICAL DATA              COVID-19 Vaccine (Series Information) Completed      04/10/2025  Outside Immunization: COVID-19(PFR) 12yrs \T\ up    09/16/2024  Imm Admin: Comirnaty (Covid-19 Vaccine, Mrna, 1519-2755 Formula)    03/23/2024  Imm Admin: Comirnaty (Covid-19 Vaccine, Mrna, 2565-7154 Formula)    09/29/2023  Imm Admin: Covid-19 Mrna (Spikevax) Moderna 12+ Years    05/15/2023  Imm Admin: MODERNA BIVALENT BOOSTER SARS-COV-2 VACCINE (6+)      Only the first 5 history entries have been loaded, but more history exists.              Pneumococcal Vaccine: 50+ Years (Series Information) Completed      01/01/2020  Imm Admin: Pneumococcal Vaccine (UF) - HISTORICAL DATA    09/20/2017  Imm Admin: Pneumococcal polysaccharide vaccine (PPSV-23)    09/21/2016  Imm Admin: Pneumococcal Conjugate Vaccine (Prevnar/PCV-13)              Hepatitis A Vaccine (Hep A) (Series Information) Aged Out      03/14/2025  Imm Admin: Hep A/HEP B Combined Vaccine (TwinRix)    03/23/2024  Imm Admin: Hep A/HEP B Combined Vaccine (TwinRix)    02/24/2024  Imm Admin: Hep A/HEP B Combined Vaccine (TwinRix)              HPV Vaccines (Series Information) Aged Out      No completion history exists for this topic.              Polio Vaccine (Inactivated Polio) (Series Information) Aged Out     No completion history exists for this topic.              Meningococcal Immunization (Series Information) Aged Out     No completion history exists for this topic.                            Patient Care Team:  Jesi Najera M.D. as PCP - General (Family Medicine)  Jesi Najera M.D. as PCP - [facility] Paneled  Nolberto Mares Ass'ning as [facility]         Social History     Tobacco Use    Smoking status: Former     Current packs/day: 0.00     Average packs/day: 1 pack/day for 25.3 years (25.3 ttl pk-yrs)     Types:  Cigarettes     Start date: 10/1/1964     Quit date: 1990     Years since quittin.3    Smokeless tobacco: Never   Vaping Use    Vaping status: Never Used   Substance Use Topics    Alcohol use: Yes     Comment: very occasional    Drug use: Yes     Types: Marijuana     Comment: occasional Cannabis edible rarely     Family History   Problem Relation Age of Onset    Glaucoma Mother     Diabetes Father     Heart Disease Father     Hypertension Father     Hyperlipidemia Father     Stroke Father     Heart Attack Neg Hx      He  has a past medical history of Diverticulosis of large intestine without hemorrhage (2018), Elevated liver enzymes (2022), Fever and chills (2023), Fungus infection (2021), High cholesterol, Left anterior knee pain (2021), Paronychia of fourth toe (2023), Prostatitis (2023), Reactive airway disease (2024), Shortness of breath, Snoring, SOB (shortness of breath) (2024), Urinary bladder disorder (2020), UTI due to extended-spectrum beta lactamase (ESBL) producing Escherichia coli (2023), and Ventral hernia (2015).    He has no past medical history of Acute nasopharyngitis, Anesthesia, Anginal syndrome ([facility]), Arrhythmia, Arthritis, Blood clotting disorder ([facility]), Bowel habit changes, Bronchitis, Cancer ([facility]), Carcinoma in situ of respiratory system, Cataract, Congestive heart failure ([facility]), Continuous ambulatory peritoneal dialysis status ([facility]), Cough, Coughing blood, Dental disorder, Diabetes ([facility]), Dialysis patient ([facility]), Disorder of thyroid, Emphysema of lung ([facility]), Glaucoma, Gynecological disorder, Heart burn, Heart murmur, Heart valve disease, Hemorrhagic disorder ([facility]), Hepatitis A, Hepatitis B, Hepatitis C, Hiatus hernia syndrome, Hypertension, Indigestion, Jaundice, Myocardial infarct ([facility]), Pacemaker, Pain, Painful breathing, Pneumonia, Pregnant, Psychiatric problem, Renal disorder, Rheumatic fever, Seizure ([facility]),  Sleep apnea, Sputum production, Stroke (HCC), Tuberculosis, Urinary incontinence, or Wheezing.   Past Surgical History:   Procedure Laterality Date    NH TRANSURETHRAL ELEC-SURG PROSTATECTOM  12/06/2022    Procedure: BIPOLAR TRANSURETHRAL RESECTION OF PROSTATE;  Surgeon: Phillip Higgins M.D.;  Location: SURGERY [facility];  Service: Urology    VENTRAL HERNIA REPAIR  04/22/2015    Performed by Sánchez Mayorga M.D. at SURGERY [facility]    COLONOSCOPY  01/01/2013    X 2    HERNIA REPAIR      OPEN REDUCTION      OTHER ORTHOPEDIC SURGERY      fx finger/ age 15    PROSTATECTOMY, RADICAL RETRO      VASECTOMY         Exam:   There were no vitals taken for this visit. There is no height or weight on file to calculate BMI.    Hearing good.    Dentition good  Alert, oriented in no acute distress.  Eye contact is good, speech goal directed, affect calm    Assessment and Plan. The following treatment and monitoring plan is recommended:    There are no diagnoses linked to this encounter.  Problem List Items Addressed This Visit       Medicare annual wellness visit, subsequent     11/5/2024 11/6/2024 11/19/2024 12/17/2024 1/21/2025 2/5/2025 3/13/2025   Vitals          Weight 224  224  218  213.07  200  201  196       3/18/2025 4/24/2025   Vitals     Weight 201.94  191.69        Age appropriate prev health care discussed   Cancer screening discussed   Vaccines discussed   Labs offered   Blood pressure at goal     Anticipatory guidance including SPF and other skin protective measures, dental hygiene and care, regular exercise, diet, stress and family planning advice discussed.               Relevant Medications    Phentermine-Topiramate (QSYMIA) 7.5-46 MG CAPSULE SR 24 HR     Assessment & Plan  Weight management  Achieved 35-pound weight loss since winter 2024, with 10 pounds lost in the past month. Highest weight was 224 pounds in 2024.  Treatment plan: Currently on Qsymia and wants to discontinue. Advised to maintain 300  minutes of moderate-intensity cardiovascular exercise per week for metabolic flexibility. Encouraged to consult the National Weight Control Registry website for resources. Discussed weight loss maintenance and potential need for continued medication support. Prescription for lower dose of Qsymia provided, to be taken every other day for 2 weeks before discontinuing completely. If increased hunger or weight regain occurs, may resume at a lower dose.    Follow-up: Follow up in 6 months.    History of Present Illness  The patient presents for his annual Medicare wellness visit.    He reports gradual weight loss due to reduced food intake and protein powder consumption 2-3 times daily. He exercises regularly with aerobics and weight machine workouts on alternate days. He lost 10 pounds in the last month and 35 pounds since winter 2024, reducing from 224 pounds. His current weight loss goal is an additional 10 pounds due to perceived abdominal rounding.    He is concerned about potential weight gain if he discontinues Qsymia, which he is taking for weight management. He has stopped calorie tracking but uses a Fitbit to monitor daily caloric expenditure, which exceeds intake. He has an appointment with a nutritionist in 06/2025. He developed an aversion to the sensation induced by Qsymia and wants to discontinue its use, believing he can maintain his eating habits without it. He reports no cravings for sweets and is satisfied with his weight loss progress. He has a history of binge eating sweets but now abstains from them.    Blood work for thyroid since his last visit showed normal results.        Services suggested: No services needed at this time  Health Care Screening: Age-appropriate preventive services recommended by USPTF and ACIP covered by Medicare were discussed today. Services ordered if indicated and agreed upon by the patient.  Referrals offered: Community-based lifestyle interventions to reduce health risks  and promote self-management and wellness, fall prevention, nutrition, physical activity, tobacco-use cessation, weight loss, and mental health services as per orders if indicated.    Discussion today about general wellness and lifestyle habits:    Prevent falls and reduce trip hazards; Cautioned about securing or removing rugs.  Have a working fire alarm and carbon monoxide detector;   Engage in regular physical activity and social activities     Follow-up: No follow-ups on file.

## 2025-04-24 NOTE — ASSESSMENT & PLAN NOTE
11/5/2024 11/6/2024 11/19/2024 12/17/2024 1/21/2025 2/5/2025 3/13/2025   Vitals          Weight 224  224  218  213.07  200  201  196       3/18/2025 4/24/2025   Vitals     Weight 201.94  191.69        Age appropriate prev health care discussed   Cancer screening discussed   Vaccines discussed   Labs offered   Blood pressure at goal     Anticipatory guidance including SPF and other skin protective measures, dental hygiene and care, regular exercise, diet, stress and family planning advice discussed.

## 2025-06-20 ENCOUNTER — NON-PROVIDER VISIT (OUTPATIENT)
Dept: INTERNAL MEDICINE | Facility: OTHER | Age: 75
End: 2025-06-20
Payer: MEDICARE

## 2025-06-20 VITALS — WEIGHT: 188 LBS | BODY MASS INDEX: 27.85 KG/M2 | HEIGHT: 69 IN

## 2025-06-20 DIAGNOSIS — Z71.3 DIETARY COUNSELING: Primary | ICD-10-CM

## 2025-06-20 PROCEDURE — 97802 MEDICAL NUTRITION INDIV IN: CPT | Performed by: DIETITIAN, REGISTERED

## 2025-06-20 ASSESSMENT — FIBROSIS 4 INDEX: FIB4 SCORE: 2.34

## 2025-06-20 NOTE — PROGRESS NOTES
"Uday is a 73 yo male here for nutrition counseling/dietitian assessment for weight loss counseling     Weight has been coming down since 2024 - was 224# in November and down to 191 in April 2025  Utilized Qsymia to support appetite regulation but has decided to come off that now   Took Qsymia from Dec 2024 - last pill taken yesterday  IN the beginning, he was tracking his calories and protein and getting in closer to 115-120 grams of protein per day and 3940-6911 calories per day     History of overeating and eating too much with sweets  Watches TV every night and ends up wanting to snack     November 220#   Today's weight 188#   He is still losing weight at this point     Weight has stalled at 185-190# and wants to lose another 10#     Snacking after dinner meals:   Used to snack on nuts but has switched to rice cakes   Choosing lower calorie protein bars  Low fat yogurt with honey     24 hour recall - Gym day   S - before gym - cup of coconut water and protein powder   B - skips usually   L - \"big shake\" with almond milk, 2 raw eggs, 1 scoop of protein powder (25g protein), banana, strawberries, sometimes rice cakes  D - sandwich with turkey lunch meat, tomato, onions, avocado, light gunter (can have turkey hi, hamburger bun with low fat gunter, cheese)     Beverages: 48 oz Diet soda, \"not much water\"     Physical activity: Gym every other day, used to hike a lot more      PES: Overweight RT less calorie deficit this last month and less cardio time (30 min vs 60) AEB a current BMI of 28.2    It was a pleasure meeting Uday today.  He has made such great progress this last 6 months and has good habits in place. We talked through some of his current behaviors and how they have decreased a bit in the last month or so and how that has created more maintenance vs weight loss (less cardio time, more snacking, less protein) and yet how that has been good for him to experience what weight maintenance feels like. Supported " him with setting goals around things he feels capable of doing right now. RTC with RD as needed    Time spent: 45 minutes

## 2025-06-20 NOTE — PATIENT INSTRUCTIONS
Goals:  If you are eating after dinner, work to include veggies and fruits with those fun foods. Or, consider breaking up the TV time with a walk or an activity   Consider increasing the intensity of your gym time when you want to focus on losing more weight but maintaining the behavior of activity to keep the weight off  Think about increasing the protein and fiber again when you are in weight loss mode - add back in that other protein shake

## 2025-07-10 ENCOUNTER — PATIENT MESSAGE (OUTPATIENT)
Dept: CARDIOLOGY | Facility: MEDICAL CENTER | Age: 75
End: 2025-07-10
Payer: MEDICARE

## (undated) DEVICE — ELECTRODE DUAL RETURN W/ CORD - (50/PK)

## (undated) DEVICE — WATER IRRIGATION STERILE 1000ML (12EA/CA)

## (undated) DEVICE — HUMID-VENT HEAT AND MOISTURE EXCHANGE- (50/BX)

## (undated) DEVICE — TOWEL STOP TIMEOUT SAFETY FLAG (40EA/CA)

## (undated) DEVICE — TOWELS CLOTH SURGICAL - (4/PK 20PK/CA)

## (undated) DEVICE — GOWN SURGICAL X-LARGE ULTRA - FILM-REINFORCED (20/CA)

## (undated) DEVICE — BAG SPONGE COUNT 10.25 X 32 - BLUE (250/CA)

## (undated) DEVICE — SUCTION INSTRUMENT YANKAUER BULBOUS TIP W/O VENT (50EA/CA)

## (undated) DEVICE — EVACUATOR BLADDER ELLIK - (10/BX)

## (undated) DEVICE — SODIUM CHL. IRRIGATION 0.9% 3000ML (4EA/CA 65CA/PF)

## (undated) DEVICE — JELLY SURGILUBE STERILE TUBE 4.25 OZ (1/EA)

## (undated) DEVICE — GOWN WARMING STANDARD FLEX - (30/CA)

## (undated) DEVICE — SPONGE GAUZESTER 4 X 4 4PLY - (128PK/CA)

## (undated) DEVICE — SENSOR OXIMETER ADULT SPO2 RD SET (20EA/BX)

## (undated) DEVICE — SYRINGE 50ML CATHETER TIP (40EA/BX 4BX/CA)

## (undated) DEVICE — CANISTER SUCTION RIGID RED 1500CC (40EA/CA)

## (undated) DEVICE — LACTATED RINGERS INJ 1000 ML - (14EA/CA 60CA/PF)

## (undated) DEVICE — SODIUM CHL IRRIGATION 0.9% 1000ML (12EA/CA)

## (undated) DEVICE — SET IRRIGATION CYSTOSCOPY Y-TYPE L81 IN (20EA/CA)

## (undated) DEVICE — GLOVE BIOGEL PI INDICATOR SZ 7.5 SURGICAL PF LF -(50/BX 4BX/CA)

## (undated) DEVICE — SYRINGE DISP. 12 CC LL - (100/BX)

## (undated) DEVICE — GLOVE BIOGEL PI ULTRATOUCH SZ 7.5 SURGICAL PF LF -(50/BX 4BX/CA)

## (undated) DEVICE — SLEEVE VASO CALF MED - (10PR/CA)

## (undated) DEVICE — TUBE CONNECTING SUCTION - CLEAR PLASTIC STERILE 72 IN (50EA/CA)

## (undated) DEVICE — SET LEADWIRE 5 LEAD BEDSIDE DISPOSABLE ECG (1SET OF 5/EA)